# Patient Record
Sex: MALE | Race: WHITE | Employment: FULL TIME | URBAN - METROPOLITAN AREA
[De-identification: names, ages, dates, MRNs, and addresses within clinical notes are randomized per-mention and may not be internally consistent; named-entity substitution may affect disease eponyms.]

---

## 2017-03-24 ENCOUNTER — OFFICE VISIT (OUTPATIENT)
Dept: INTERNAL MEDICINE | Age: 42
End: 2017-03-24
Attending: PODIATRIST

## 2017-03-24 DIAGNOSIS — S82.892D CLOSED FRACTURE OF LEFT ANKLE WITH ROUTINE HEALING: ICD-10-CM

## 2017-03-24 RX ORDER — OXYCODONE HYDROCHLORIDE AND ACETAMINOPHEN 5; 325 MG/1; MG/1
1-2 TABLET ORAL EVERY 4 HOURS PRN
Qty: 30 TABLET | Refills: 0 | Status: SHIPPED | OUTPATIENT
Start: 2017-03-24 | End: 2017-03-31

## 2017-03-28 ENCOUNTER — TELEPHONE (OUTPATIENT)
Dept: INTERNAL MEDICINE | Age: 42
End: 2017-03-28

## 2017-03-31 ENCOUNTER — TELEPHONE (OUTPATIENT)
Dept: INTERNAL MEDICINE | Age: 42
End: 2017-03-31

## 2017-04-07 ENCOUNTER — OFFICE VISIT (OUTPATIENT)
Dept: INTERNAL MEDICINE | Age: 42
End: 2017-04-07
Attending: PODIATRIST

## 2017-04-07 DIAGNOSIS — S82.892D CLOSED FRACTURE OF LEFT ANKLE WITH ROUTINE HEALING: Primary | ICD-10-CM

## 2017-04-21 ENCOUNTER — OFFICE VISIT (OUTPATIENT)
Dept: INTERNAL MEDICINE | Age: 42
End: 2017-04-21
Attending: PODIATRIST

## 2017-04-21 ENCOUNTER — HOSPITAL ENCOUNTER (OUTPATIENT)
Dept: OTHER | Age: 42
Discharge: HOME | End: 2017-04-21
Attending: PODIATRIST | Admitting: PODIATRIST

## 2017-04-21 DIAGNOSIS — S82.892G ANKLE FRACTURE, LEFT, CLOSED, WITH DELAYED HEALING, SUBSEQUENT ENCOUNTER: Primary | ICD-10-CM

## 2017-04-21 DIAGNOSIS — S82.892D CLOSED FRACTURE OF LEFT ANKLE WITH ROUTINE HEALING: ICD-10-CM

## 2017-05-05 ENCOUNTER — HOSPITAL ENCOUNTER (OUTPATIENT)
Dept: OTHER | Age: 42
Discharge: HOME | End: 2017-05-05
Attending: PODIATRIST | Admitting: PODIATRIST

## 2017-05-05 DIAGNOSIS — S82.892G ANKLE FRACTURE, LEFT, CLOSED, WITH DELAYED HEALING, SUBSEQUENT ENCOUNTER: ICD-10-CM

## 2017-05-26 ENCOUNTER — HOSPITAL ENCOUNTER (OUTPATIENT)
Dept: OTHER | Age: 42
Discharge: HOME | End: 2017-05-26
Attending: PODIATRIST | Admitting: PODIATRIST

## 2017-05-26 DIAGNOSIS — S82.892D CLOSED FRACTURE OF LEFT ANKLE WITH ROUTINE HEALING: ICD-10-CM

## 2017-06-02 ENCOUNTER — OFFICE VISIT (OUTPATIENT)
Dept: INTERNAL MEDICINE | Age: 42
End: 2017-06-02
Attending: PODIATRIST

## 2017-06-02 DIAGNOSIS — S82.892D CLOSED FRACTURE OF LEFT ANKLE WITH ROUTINE HEALING: Primary | ICD-10-CM

## 2017-10-12 PROBLEM — R56.9 ALCOHOL WITHDRAWAL SEIZURE WITHOUT COMPLICATION (HCC): Status: ACTIVE | Noted: 2017-10-12

## 2017-10-12 PROBLEM — F10.939 ALCOHOL WITHDRAWAL (HCC): Status: ACTIVE | Noted: 2017-10-12

## 2017-10-12 PROBLEM — F10.930 ALCOHOL WITHDRAWAL SEIZURE WITHOUT COMPLICATION (HCC): Status: ACTIVE | Noted: 2017-10-12

## 2017-10-13 ENCOUNTER — CASE MANAGEMENT (OUTPATIENT)
Dept: EMERGENCY DEPT | Age: 42
End: 2017-10-13

## 2017-10-13 PROBLEM — F10.20 ALCOHOL USE DISORDER, SEVERE, DEPENDENCE (HCC): Chronic | Status: ACTIVE | Noted: 2017-10-13

## 2017-10-14 PROBLEM — E87.6 HYPOKALEMIA: Status: ACTIVE | Noted: 2017-10-14

## 2017-10-14 PROBLEM — G93.41 ACUTE METABOLIC ENCEPHALOPATHY: Status: ACTIVE | Noted: 2017-10-14

## 2017-10-15 PROBLEM — F10.931 ALCOHOL WITHDRAWAL SYNDROME, WITH DELIRIUM (HCC): Status: ACTIVE | Noted: 2017-10-12

## 2018-12-09 ENCOUNTER — APPOINTMENT (OUTPATIENT)
Dept: CT IMAGING | Age: 43
DRG: 897 | End: 2018-12-09

## 2018-12-09 ENCOUNTER — HOSPITAL ENCOUNTER (INPATIENT)
Age: 43
LOS: 6 days | Discharge: HOME | DRG: 897 | End: 2018-12-15
Attending: EMERGENCY MEDICINE | Admitting: FAMILY MEDICINE

## 2018-12-09 DIAGNOSIS — I63.9 CEREBROVASCULAR ACCIDENT (CVA), UNSPECIFIED MECHANISM (HCC): ICD-10-CM

## 2018-12-09 DIAGNOSIS — R56.9 SEIZURE (HCC): Primary | ICD-10-CM

## 2018-12-09 DIAGNOSIS — S09.90XA INJURY OF HEAD, INITIAL ENCOUNTER: ICD-10-CM

## 2018-12-09 DIAGNOSIS — F10.939 ALCOHOL WITHDRAWAL SYNDROME WITH COMPLICATION (HCC): ICD-10-CM

## 2018-12-09 LAB
A/G RATIO: 1 (ref 1.1–2.2)
ALBUMIN SERPL-MCNC: 4.2 G/DL (ref 3.4–5)
ALP BLD-CCNC: 77 U/L (ref 40–129)
ALT SERPL-CCNC: 100 U/L (ref 10–40)
ANION GAP SERPL CALCULATED.3IONS-SCNC: 22 MMOL/L (ref 3–16)
APTT: 26.5 SEC (ref 26–36)
AST SERPL-CCNC: 130 U/L (ref 15–37)
BASOPHILS ABSOLUTE: 0 K/UL (ref 0–0.2)
BASOPHILS RELATIVE PERCENT: 0.7 %
BILIRUB SERPL-MCNC: 1.8 MG/DL (ref 0–1)
BUN BLDV-MCNC: 10 MG/DL (ref 7–20)
CALCIUM SERPL-MCNC: 9.3 MG/DL (ref 8.3–10.6)
CHLORIDE BLD-SCNC: 93 MMOL/L (ref 99–110)
CO2: 23 MMOL/L (ref 21–32)
CREAT SERPL-MCNC: 0.6 MG/DL (ref 0.9–1.3)
EOSINOPHILS ABSOLUTE: 0 K/UL (ref 0–0.6)
EOSINOPHILS RELATIVE PERCENT: 0 %
ETHANOL: NORMAL MG/DL (ref 0–0.08)
GFR AFRICAN AMERICAN: >60
GFR NON-AFRICAN AMERICAN: >60
GLOBULIN: 4.2 G/DL
GLUCOSE BLD-MCNC: 169 MG/DL (ref 70–99)
HCT VFR BLD CALC: 37.6 % (ref 40.5–52.5)
HEMOGLOBIN: 13 G/DL (ref 13.5–17.5)
INR BLD: 0.99 (ref 0.86–1.14)
LYMPHOCYTES ABSOLUTE: 0.6 K/UL (ref 1–5.1)
LYMPHOCYTES RELATIVE PERCENT: 10.9 %
MCH RBC QN AUTO: 35.1 PG (ref 26–34)
MCHC RBC AUTO-ENTMCNC: 34.6 G/DL (ref 31–36)
MCV RBC AUTO: 101.5 FL (ref 80–100)
MONOCYTES ABSOLUTE: 0.6 K/UL (ref 0–1.3)
MONOCYTES RELATIVE PERCENT: 10.1 %
NEUTROPHILS ABSOLUTE: 4.6 K/UL (ref 1.7–7.7)
NEUTROPHILS RELATIVE PERCENT: 78.3 %
PDW BLD-RTO: 13.8 % (ref 12.4–15.4)
PLATELET # BLD: 154 K/UL (ref 135–450)
PMV BLD AUTO: 7.8 FL (ref 5–10.5)
POTASSIUM SERPL-SCNC: 3.3 MMOL/L (ref 3.5–5.1)
PROTHROMBIN TIME: 11.3 SEC (ref 9.8–13)
RBC # BLD: 3.71 M/UL (ref 4.2–5.9)
SODIUM BLD-SCNC: 138 MMOL/L (ref 136–145)
TOTAL PROTEIN: 8.4 G/DL (ref 6.4–8.2)
WBC # BLD: 5.9 K/UL (ref 4–11)

## 2018-12-09 PROCEDURE — G0480 DRUG TEST DEF 1-7 CLASSES: HCPCS

## 2018-12-09 PROCEDURE — 2500000003 HC RX 250 WO HCPCS: Performed by: PHYSICIAN ASSISTANT

## 2018-12-09 PROCEDURE — 96368 THER/DIAG CONCURRENT INF: CPT

## 2018-12-09 PROCEDURE — 6370000000 HC RX 637 (ALT 250 FOR IP): Performed by: FAMILY MEDICINE

## 2018-12-09 PROCEDURE — 2580000003 HC RX 258: Performed by: PHYSICIAN ASSISTANT

## 2018-12-09 PROCEDURE — 80053 COMPREHEN METABOLIC PANEL: CPT

## 2018-12-09 PROCEDURE — 72125 CT NECK SPINE W/O DYE: CPT

## 2018-12-09 PROCEDURE — 85610 PROTHROMBIN TIME: CPT

## 2018-12-09 PROCEDURE — 96375 TX/PRO/DX INJ NEW DRUG ADDON: CPT

## 2018-12-09 PROCEDURE — 6360000002 HC RX W HCPCS: Performed by: PHYSICIAN ASSISTANT

## 2018-12-09 PROCEDURE — 96365 THER/PROPH/DIAG IV INF INIT: CPT

## 2018-12-09 PROCEDURE — 85730 THROMBOPLASTIN TIME PARTIAL: CPT

## 2018-12-09 PROCEDURE — 70450 CT HEAD/BRAIN W/O DYE: CPT

## 2018-12-09 PROCEDURE — 6360000002 HC RX W HCPCS: Performed by: FAMILY MEDICINE

## 2018-12-09 PROCEDURE — 2500000003 HC RX 250 WO HCPCS: Performed by: FAMILY MEDICINE

## 2018-12-09 PROCEDURE — 85025 COMPLETE CBC W/AUTO DIFF WBC: CPT

## 2018-12-09 PROCEDURE — 2060000000 HC ICU INTERMEDIATE R&B

## 2018-12-09 PROCEDURE — 99285 EMERGENCY DEPT VISIT HI MDM: CPT

## 2018-12-09 PROCEDURE — 80074 ACUTE HEPATITIS PANEL: CPT

## 2018-12-09 PROCEDURE — 2580000003 HC RX 258: Performed by: FAMILY MEDICINE

## 2018-12-09 RX ORDER — ATORVASTATIN CALCIUM 80 MG/1
40 TABLET, FILM COATED ORAL NIGHTLY
Status: DISCONTINUED | OUTPATIENT
Start: 2018-12-09 | End: 2018-12-15 | Stop reason: HOSPADM

## 2018-12-09 RX ORDER — LORAZEPAM 2 MG/ML
3 INJECTION INTRAMUSCULAR
Status: DISCONTINUED | OUTPATIENT
Start: 2018-12-09 | End: 2018-12-15 | Stop reason: HOSPADM

## 2018-12-09 RX ORDER — LORAZEPAM 2 MG/ML
1 INJECTION INTRAMUSCULAR ONCE
Status: COMPLETED | OUTPATIENT
Start: 2018-12-09 | End: 2018-12-09

## 2018-12-09 RX ORDER — LORAZEPAM 1 MG/1
3 TABLET ORAL
Status: DISCONTINUED | OUTPATIENT
Start: 2018-12-09 | End: 2018-12-15 | Stop reason: HOSPADM

## 2018-12-09 RX ORDER — LORAZEPAM 1 MG/1
1 TABLET ORAL
Status: DISCONTINUED | OUTPATIENT
Start: 2018-12-09 | End: 2018-12-15 | Stop reason: HOSPADM

## 2018-12-09 RX ORDER — LORAZEPAM 1 MG/1
2 TABLET ORAL
Status: DISCONTINUED | OUTPATIENT
Start: 2018-12-09 | End: 2018-12-15 | Stop reason: HOSPADM

## 2018-12-09 RX ORDER — SODIUM CHLORIDE 0.9 % (FLUSH) 0.9 %
10 SYRINGE (ML) INJECTION PRN
Status: DISCONTINUED | OUTPATIENT
Start: 2018-12-09 | End: 2018-12-15 | Stop reason: HOSPADM

## 2018-12-09 RX ORDER — LORAZEPAM 2 MG/ML
2 INJECTION INTRAMUSCULAR
Status: DISCONTINUED | OUTPATIENT
Start: 2018-12-09 | End: 2018-12-15 | Stop reason: HOSPADM

## 2018-12-09 RX ORDER — LORAZEPAM 2 MG/ML
4 INJECTION INTRAMUSCULAR
Status: DISCONTINUED | OUTPATIENT
Start: 2018-12-09 | End: 2018-12-15 | Stop reason: HOSPADM

## 2018-12-09 RX ORDER — SODIUM CHLORIDE 0.9 % (FLUSH) 0.9 %
10 SYRINGE (ML) INJECTION EVERY 12 HOURS SCHEDULED
Status: DISCONTINUED | OUTPATIENT
Start: 2018-12-09 | End: 2018-12-15 | Stop reason: HOSPADM

## 2018-12-09 RX ORDER — MAGNESIUM SULFATE 1 G/100ML
1 INJECTION INTRAVENOUS PRN
Status: DISCONTINUED | OUTPATIENT
Start: 2018-12-09 | End: 2018-12-15 | Stop reason: HOSPADM

## 2018-12-09 RX ORDER — LORAZEPAM 1 MG/1
4 TABLET ORAL
Status: DISCONTINUED | OUTPATIENT
Start: 2018-12-09 | End: 2018-12-15 | Stop reason: HOSPADM

## 2018-12-09 RX ORDER — POTASSIUM CHLORIDE 20 MEQ/1
40 TABLET, EXTENDED RELEASE ORAL PRN
Status: DISCONTINUED | OUTPATIENT
Start: 2018-12-09 | End: 2018-12-15 | Stop reason: HOSPADM

## 2018-12-09 RX ORDER — LORAZEPAM 2 MG/ML
1 INJECTION INTRAMUSCULAR
Status: DISCONTINUED | OUTPATIENT
Start: 2018-12-09 | End: 2018-12-15 | Stop reason: HOSPADM

## 2018-12-09 RX ORDER — POTASSIUM CHLORIDE 7.45 MG/ML
10 INJECTION INTRAVENOUS PRN
Status: DISCONTINUED | OUTPATIENT
Start: 2018-12-09 | End: 2018-12-15 | Stop reason: HOSPADM

## 2018-12-09 RX ORDER — ONDANSETRON 2 MG/ML
4 INJECTION INTRAMUSCULAR; INTRAVENOUS EVERY 6 HOURS PRN
Status: DISCONTINUED | OUTPATIENT
Start: 2018-12-09 | End: 2018-12-15 | Stop reason: HOSPADM

## 2018-12-09 RX ADMIN — LORAZEPAM 4 MG: 1 TABLET ORAL at 18:16

## 2018-12-09 RX ADMIN — LORAZEPAM 1 MG: 2 INJECTION INTRAMUSCULAR; INTRAVENOUS at 13:53

## 2018-12-09 RX ADMIN — FOLIC ACID: 5 INJECTION, SOLUTION INTRAMUSCULAR; INTRAVENOUS; SUBCUTANEOUS at 18:16

## 2018-12-09 RX ADMIN — Medication 10 ML: at 20:44

## 2018-12-09 RX ADMIN — FOLIC ACID: 5 INJECTION, SOLUTION INTRAMUSCULAR; INTRAVENOUS; SUBCUTANEOUS at 14:13

## 2018-12-09 RX ADMIN — LORAZEPAM 2 MG: 2 INJECTION INTRAMUSCULAR; INTRAVENOUS at 15:29

## 2018-12-09 RX ADMIN — LORAZEPAM 2 MG: 2 INJECTION INTRAMUSCULAR; INTRAVENOUS at 20:44

## 2018-12-09 ASSESSMENT — PAIN DESCRIPTION - DESCRIPTORS: DESCRIPTORS: HEADACHE

## 2018-12-09 ASSESSMENT — ENCOUNTER SYMPTOMS
DIARRHEA: 0
VOMITING: 0
SHORTNESS OF BREATH: 0
BACK PAIN: 0
ANAL BLEEDING: 0
ABDOMINAL PAIN: 0
NAUSEA: 0

## 2018-12-09 ASSESSMENT — PAIN SCALES - GENERAL: PAINLEVEL_OUTOF10: 0

## 2018-12-09 NOTE — ED NOTES
Pt a/o x 4, with c/o head pain from fall. Dr. Mcgill Must bedside to evaluate pt. Pt undressed, placed on heart monitor with b/p set to cycle. Pt lying supine in bed with bed in low position, side rails up x 2, with seizure pads in place per protocol. Pt IV established and blood work obtained. Pt with noted visible tremors. Pt c/o nausea. CIWA completed.  Pt updated on plan of care, v/u.      Ana M Alexander, RN  12/09/18 2241

## 2018-12-09 NOTE — ED PROVIDER NOTES
given some Ativan and started on CIWA protocol. He denies suicidal or homicidal ideation. Neurologic exam is unremarkable besides a mild initial confusion. CT of the head was obtained due to head injury along with CT of the neck due to his initial confusion. CT the head does reveal an age-indeterminate infarct. Patient is still slightly tachycardic and hypertensive. Concern is for possible seizure related, with troponin was given banana bag and placed on seizure precautions. Given this likely a local withdrawal with infarct on CT patient was admitted to hospital service for further workup and treatment. Patient understood why he needed to be admitted with stable time of admission. FINAL IMPRESSION      1. Seizure ([address]    2. Alcohol withdrawal syndrome with complication ([address]    3. Injury of head, initial encounter    4. Cerebrovascular accident (CVA), unspecified mechanism ([facility] [address]          [address] Admitted 12/09/2018 03:23:26 PM      PATIENT REFERREDTO:  No follow-up provider specified.     DISCHARGE MEDICATIONS:  New Prescriptions    No medications on file       DISCONTINUED MEDICATIONS:  Discontinued Medications    No medications on file              (Please note that portions ofthis note were completed with a voice recognition program.  Efforts were made to edit the dictations but occasionally words are mis-transcribed.)    Marjan Sanchez PA-C (electronically signed)           Marjan Sanchez PA-C  12/09/18 3807

## 2018-12-09 NOTE — ED PROVIDER NOTES
normal coordination of extremities, no facial asymmetry  Psychiatric:  Affect appropriate    Medical Decision Making and Plan: Briefly, this is an 39 y.o.male who presented with seizure that occurred at work. He has a history of alcohol withdrawal seizure in the past with admission last year. We have suspicion for recurrent alcohol withdrawal.  The patient had incontinence of urine with the seizure he had today with a forehead hematoma. CT findings abnormal without ICH. Plan is to admit for further care. For further details of [facility] encounter, please see documentation by advanced practice provider STEVE Hyde.      Labs Reviewed   CBC WITH AUTO DIFFERENTIAL - Abnormal; Notable for the following:        Result Value    RBC 3.71 (*)     Hemoglobin 13.0 (*)     Hematocrit 37.6 (*)     .5 (*)     MCH 35.1 (*)     Lymphocytes # 0.6 (*)     All other components within normal limits    Narrative:     Performed at:  [facility] [address],  [place], [employer] [place]   Phone [phone]   COMPREHENSIVE METABOLIC PANEL - Abnormal; Notable for the following:     Potassium 3.3 (*)     Chloride 93 (*)     Anion Gap 22 (*)     Glucose 169 (*)     CREATININE 0.6 (*)     Total Protein 8.4 (*)     Albumin/Globulin Ratio 1.0 (*)     Total Bilirubin 1.8 (*)      (*)      (*)     All other components within normal limits    Narrative:     Performed at:  [facility] [address],  [place], [employer]   Phone [phone]   APTT    Narrative:     Performed at:  [facility] [address],  [place], [employer]   Phone [phone]   PROTIME-INR    Narrative:     Performed at:  [facility] [address],  [place]   Phone [phone]   ETHANOL    Narrative:     Performed at:  [facility]  [address] [facility], [address]   Phone [phone]   URINE RT REFLEX TO CULTURE     RADIOLOGY:     Plain x-rays were viewed by me:   CT Cervical Spine WO Contrast   Final Result   1. No acute fracture. CT Head WO Contrast   Final Result   1. New age-indeterminate right basal ganglia infarct. If there are focal   neurologic deficits, recommend MRI of the head. CRITICAL CARE:  Total critical care time of 31 minutes (excludes any time for procedures). This includes but not limited to vital sign monitoring, medication, clinical response to medications, interpretation of diagnostics, review of nursing notes, pertinent record review, discussions about patient condition, consultation time, documentation time. Critical care due to the patient's alcohol withdrawal.    Medications administered:  Medications   LORazepam (ATIVAN) tablet 1 mg ( Oral See Alternative 12/9/18 1529)     Or   LORazepam (ATIVAN) injection 1 mg ( Intravenous See Alternative 12/9/18 1529)     Or   LORazepam (ATIVAN) tablet 2 mg ( Oral See Alternative 12/9/18 1529)     Or   LORazepam (ATIVAN) injection 2 mg (2 mg Intravenous Given 12/9/18 1529)     Or   LORazepam (ATIVAN) tablet 3 mg ( Oral See Alternative 12/9/18 1529)     Or   LORazepam (ATIVAN) injection 3 mg ( Intravenous See Alternative 12/9/18 1529)     Or   LORazepam (ATIVAN) tablet 4 mg ( Oral See Alternative 12/9/18 1529)     Or   LORazepam (ATIVAN) injection 4 mg ( Intravenous See Alternative 12/9/18 1529)   sodium chloride 0.9 % 9,323 mL with folic acid 1 mg, adult multi-vitamin with vitamin k 10 mL, thiamine 100 mg ( Intravenous Stopped 12/9/18 1624)   LORazepam (ATIVAN) injection 1 mg (1 mg Intravenous Given 12/9/18 1353)     Vitals:    12/09/18 1530 12/09/18 1545 12/09/18 1600 12/09/18 1614   BP: (!) 160/95 (!) 156/98 (!) 147/97 (!) 163/99   Pulse: 94 94 96 98   Resp: 12 15 12 13   Temp:       SpO2: 97%      Weight:         FINAL IMPRESSION:    1.

## 2018-12-10 ENCOUNTER — APPOINTMENT (OUTPATIENT)
Dept: ULTRASOUND IMAGING | Age: 43
DRG: 897 | End: 2018-12-10

## 2018-12-10 PROBLEM — G31.2 ALCOHOLIC ENCEPHALOPATHY (HCC): Status: ACTIVE | Noted: 2018-12-10

## 2018-12-10 LAB
ANION GAP SERPL CALCULATED.3IONS-SCNC: 15 MMOL/L (ref 3–16)
BUN BLDV-MCNC: 7 MG/DL (ref 7–20)
CALCIUM SERPL-MCNC: 8.8 MG/DL (ref 8.3–10.6)
CHLORIDE BLD-SCNC: 98 MMOL/L (ref 99–110)
CO2: 26 MMOL/L (ref 21–32)
CREAT SERPL-MCNC: <0.5 MG/DL (ref 0.9–1.3)
GFR AFRICAN AMERICAN: >60
GFR NON-AFRICAN AMERICAN: >60
GLUCOSE BLD-MCNC: 80 MG/DL (ref 70–99)
HAV IGM SER IA-ACNC: NORMAL
HEPATITIS B CORE IGM ANTIBODY: NORMAL
HEPATITIS B SURFACE ANTIGEN INTERPRETATION: NORMAL
HEPATITIS C ANTIBODY INTERPRETATION: NORMAL
MAGNESIUM: 1.3 MG/DL (ref 1.8–2.4)
POTASSIUM SERPL-SCNC: 3 MMOL/L (ref 3.5–5.1)
SODIUM BLD-SCNC: 139 MMOL/L (ref 136–145)

## 2018-12-10 PROCEDURE — 6360000002 HC RX W HCPCS: Performed by: INTERNAL MEDICINE

## 2018-12-10 PROCEDURE — 6370000000 HC RX 637 (ALT 250 FOR IP): Performed by: NURSE PRACTITIONER

## 2018-12-10 PROCEDURE — 95819 EEG AWAKE AND ASLEEP: CPT | Performed by: PSYCHIATRY & NEUROLOGY

## 2018-12-10 PROCEDURE — 36415 COLL VENOUS BLD VENIPUNCTURE: CPT

## 2018-12-10 PROCEDURE — 2000000000 HC ICU R&B

## 2018-12-10 PROCEDURE — 83735 ASSAY OF MAGNESIUM: CPT

## 2018-12-10 PROCEDURE — 6370000000 HC RX 637 (ALT 250 FOR IP): Performed by: PHYSICIAN ASSISTANT

## 2018-12-10 PROCEDURE — 6360000002 HC RX W HCPCS: Performed by: FAMILY MEDICINE

## 2018-12-10 PROCEDURE — 2580000003 HC RX 258: Performed by: INTERNAL MEDICINE

## 2018-12-10 PROCEDURE — 6360000002 HC RX W HCPCS: Performed by: PHYSICIAN ASSISTANT

## 2018-12-10 PROCEDURE — 2580000003 HC RX 258: Performed by: FAMILY MEDICINE

## 2018-12-10 PROCEDURE — 80048 BASIC METABOLIC PNL TOTAL CA: CPT

## 2018-12-10 PROCEDURE — 6360000002 HC RX W HCPCS: Performed by: NURSE PRACTITIONER

## 2018-12-10 PROCEDURE — 95819 EEG AWAKE AND ASLEEP: CPT

## 2018-12-10 PROCEDURE — 2500000003 HC RX 250 WO HCPCS: Performed by: FAMILY MEDICINE

## 2018-12-10 PROCEDURE — 2500000003 HC RX 250 WO HCPCS: Performed by: INTERNAL MEDICINE

## 2018-12-10 PROCEDURE — 6370000000 HC RX 637 (ALT 250 FOR IP): Performed by: FAMILY MEDICINE

## 2018-12-10 PROCEDURE — 76705 ECHO EXAM OF ABDOMEN: CPT

## 2018-12-10 PROCEDURE — 6360000002 HC RX W HCPCS: Performed by: HOSPITALIST

## 2018-12-10 RX ORDER — HALOPERIDOL 5 MG/ML
INJECTION INTRAMUSCULAR
Status: DISPENSED
Start: 2018-12-10 | End: 2018-12-11

## 2018-12-10 RX ORDER — HALOPERIDOL 5 MG/ML
5 INJECTION INTRAMUSCULAR ONCE
Status: COMPLETED | OUTPATIENT
Start: 2018-12-10 | End: 2018-12-10

## 2018-12-10 RX ORDER — MAGNESIUM SULFATE IN WATER 40 MG/ML
4 INJECTION, SOLUTION INTRAVENOUS ONCE
Status: COMPLETED | OUTPATIENT
Start: 2018-12-10 | End: 2018-12-10

## 2018-12-10 RX ADMIN — POTASSIUM CHLORIDE 10 MEQ: 7.46 INJECTION, SOLUTION INTRAVENOUS at 23:01

## 2018-12-10 RX ADMIN — ATORVASTATIN CALCIUM 40 MG: 80 TABLET, FILM COATED ORAL at 00:04

## 2018-12-10 RX ADMIN — Medication 10 ML: at 20:02

## 2018-12-10 RX ADMIN — Medication 10 ML: at 10:07

## 2018-12-10 RX ADMIN — FOLIC ACID: 5 INJECTION, SOLUTION INTRAMUSCULAR; INTRAVENOUS; SUBCUTANEOUS at 09:05

## 2018-12-10 RX ADMIN — POTASSIUM CHLORIDE 10 MEQ: 7.46 INJECTION, SOLUTION INTRAVENOUS at 22:01

## 2018-12-10 RX ADMIN — LORAZEPAM 4 MG: 2 INJECTION INTRAMUSCULAR; INTRAVENOUS at 16:22

## 2018-12-10 RX ADMIN — LORAZEPAM 4 MG: 2 INJECTION INTRAMUSCULAR; INTRAVENOUS at 09:02

## 2018-12-10 RX ADMIN — LORAZEPAM 4 MG: 2 INJECTION INTRAMUSCULAR; INTRAVENOUS at 13:39

## 2018-12-10 RX ADMIN — LORAZEPAM 4 MG: 2 INJECTION INTRAMUSCULAR; INTRAVENOUS at 08:17

## 2018-12-10 RX ADMIN — HALOPERIDOL LACTATE 5 MG: 5 INJECTION INTRAMUSCULAR at 17:24

## 2018-12-10 RX ADMIN — DEXMEDETOMIDINE HYDROCHLORIDE 0.2 MCG/KG/HR: 100 INJECTION, SOLUTION INTRAVENOUS at 19:47

## 2018-12-10 RX ADMIN — LORAZEPAM 3 MG: 2 INJECTION INTRAMUSCULAR; INTRAVENOUS at 11:12

## 2018-12-10 RX ADMIN — LORAZEPAM 4 MG: 2 INJECTION INTRAMUSCULAR; INTRAVENOUS at 17:33

## 2018-12-10 RX ADMIN — MAGNESIUM SULFATE HEPTAHYDRATE 4 G: 40 INJECTION, SOLUTION INTRAVENOUS at 11:31

## 2018-12-10 RX ADMIN — LORAZEPAM 4 MG: 2 INJECTION INTRAMUSCULAR; INTRAVENOUS at 18:55

## 2018-12-10 RX ADMIN — LORAZEPAM 2 MG: 2 INJECTION INTRAMUSCULAR; INTRAVENOUS at 03:37

## 2018-12-10 RX ADMIN — POTASSIUM CHLORIDE 10 MEQ: 7.46 INJECTION, SOLUTION INTRAVENOUS at 20:55

## 2018-12-10 RX ADMIN — LORAZEPAM 4 MG: 2 INJECTION INTRAMUSCULAR; INTRAVENOUS at 19:55

## 2018-12-10 RX ADMIN — LORAZEPAM 2 MG: 2 INJECTION INTRAMUSCULAR; INTRAVENOUS at 10:05

## 2018-12-10 RX ADMIN — LORAZEPAM 4 MG: 1 TABLET ORAL at 06:03

## 2018-12-10 RX ADMIN — LORAZEPAM 4 MG: 2 INJECTION INTRAMUSCULAR; INTRAVENOUS at 21:00

## 2018-12-10 RX ADMIN — POTASSIUM CHLORIDE 10 MEQ: 7.46 INJECTION, SOLUTION INTRAVENOUS at 15:49

## 2018-12-10 RX ADMIN — POTASSIUM CHLORIDE 40 MEQ: 1500 TABLET, EXTENDED RELEASE ORAL at 00:04

## 2018-12-10 RX ADMIN — LORAZEPAM 2 MG: 2 INJECTION INTRAMUSCULAR; INTRAVENOUS at 07:04

## 2018-12-10 RX ADMIN — POTASSIUM CHLORIDE 10 MEQ: 7.46 INJECTION, SOLUTION INTRAVENOUS at 19:19

## 2018-12-10 ASSESSMENT — PAIN DESCRIPTION - DESCRIPTORS: DESCRIPTORS: ACHING

## 2018-12-10 ASSESSMENT — PAIN DESCRIPTION - PAIN TYPE: TYPE: ACUTE PAIN

## 2018-12-10 ASSESSMENT — PAIN SCALES - GENERAL
PAINLEVEL_OUTOF10: 0
PAINLEVEL_OUTOF10: 0
PAINLEVEL_OUTOF10: 4
PAINLEVEL_OUTOF10: 0

## 2018-12-10 ASSESSMENT — PAIN DESCRIPTION - LOCATION: LOCATION: HEAD

## 2018-12-10 NOTE — PLAN OF CARE
Problem: Falls - Risk of:  Goal: Absence of physical injury  Absence of physical injury   Outcome: Ongoing  Patient has remained free of falls and physical injury at the time of this note. Preventative measures in place: CIWA protocol, camera in room, bed alarm, patient's room near nurses station   Patient's ambulatory status: High fall risk, patient unable to ambulate at this time. Impulsive, confused, poor safety awareness.

## 2018-12-10 NOTE — PROGRESS NOTES
Pt found with bed alarm sounding as he was attempting to climb out of bed with his legs over the rail. RN seen large pocket knife clipped to his pants which was removed at this time. Pt assisted to use urinal and assisted back to bed with bed alarm set. Security was notified and they locked up the pt's lighter, pocket knife and wallet. Pt wishes to keep his phone and glasses at this time.

## 2018-12-10 NOTE — PROGRESS NOTES
Pt moved close to nurses station for safety. Pt resting in bed with eyes closed at this time. He awakes to verbal stimuli and is cooperative and appropriate at this time. Pt is diaphoretic and tremulous at this time see CIWA, will medicate as appropriate.

## 2018-12-10 NOTE — CARE COORDINATION
Patient was referred to the Benefits counselor to assess insurance eligibility, and medication assistance.

## 2018-12-10 NOTE — PROGRESS NOTES
Pt medicated per BILL after he was attempting to climb out of bed, he stated he has been her for 4 days and hasn't walked since then. Pt showing severe tremors at this time along with headache and beading sweat. Pt is now unsure of what day it is he states it is Thursday 12-12.

## 2018-12-11 LAB
ALBUMIN SERPL-MCNC: 3.9 G/DL (ref 3.4–5)
ALP BLD-CCNC: 75 U/L (ref 40–129)
ALT SERPL-CCNC: 112 U/L (ref 10–40)
ANION GAP SERPL CALCULATED.3IONS-SCNC: 19 MMOL/L (ref 3–16)
AST SERPL-CCNC: 168 U/L (ref 15–37)
BILIRUB SERPL-MCNC: 2.1 MG/DL (ref 0–1)
BILIRUBIN DIRECT: 0.4 MG/DL (ref 0–0.3)
BILIRUBIN, INDIRECT: 1.7 MG/DL (ref 0–1)
BUN BLDV-MCNC: 7 MG/DL (ref 7–20)
CALCIUM SERPL-MCNC: 8.7 MG/DL (ref 8.3–10.6)
CHLORIDE BLD-SCNC: 97 MMOL/L (ref 99–110)
CO2: 22 MMOL/L (ref 21–32)
CREAT SERPL-MCNC: <0.5 MG/DL (ref 0.9–1.3)
GFR AFRICAN AMERICAN: >60
GFR NON-AFRICAN AMERICAN: >60
GLUCOSE BLD-MCNC: 81 MG/DL (ref 70–99)
HCT VFR BLD CALC: 37.5 % (ref 40.5–52.5)
HEMOGLOBIN: 12.9 G/DL (ref 13.5–17.5)
MAGNESIUM: 1.8 MG/DL (ref 1.8–2.4)
MCH RBC QN AUTO: 34.7 PG (ref 26–34)
MCHC RBC AUTO-ENTMCNC: 34.5 G/DL (ref 31–36)
MCV RBC AUTO: 100.6 FL (ref 80–100)
PDW BLD-RTO: 13.3 % (ref 12.4–15.4)
PHOSPHORUS: 2.7 MG/DL (ref 2.5–4.9)
PLATELET # BLD: 144 K/UL (ref 135–450)
PMV BLD AUTO: 8 FL (ref 5–10.5)
POTASSIUM SERPL-SCNC: 3.3 MMOL/L (ref 3.5–5.1)
POTASSIUM SERPL-SCNC: 3.3 MMOL/L (ref 3.5–5.1)
RBC # BLD: 3.73 M/UL (ref 4.2–5.9)
SODIUM BLD-SCNC: 138 MMOL/L (ref 136–145)
TOTAL PROTEIN: 8.3 G/DL (ref 6.4–8.2)
WBC # BLD: 6.1 K/UL (ref 4–11)

## 2018-12-11 PROCEDURE — 6360000002 HC RX W HCPCS: Performed by: PHYSICIAN ASSISTANT

## 2018-12-11 PROCEDURE — 2580000003 HC RX 258: Performed by: FAMILY MEDICINE

## 2018-12-11 PROCEDURE — 83735 ASSAY OF MAGNESIUM: CPT

## 2018-12-11 PROCEDURE — 6360000002 HC RX W HCPCS: Performed by: PEDIATRICS

## 2018-12-11 PROCEDURE — 84132 ASSAY OF SERUM POTASSIUM: CPT

## 2018-12-11 PROCEDURE — 6370000000 HC RX 637 (ALT 250 FOR IP): Performed by: PHYSICIAN ASSISTANT

## 2018-12-11 PROCEDURE — 6370000000 HC RX 637 (ALT 250 FOR IP): Performed by: NURSE PRACTITIONER

## 2018-12-11 PROCEDURE — 36415 COLL VENOUS BLD VENIPUNCTURE: CPT

## 2018-12-11 PROCEDURE — 2000000000 HC ICU R&B

## 2018-12-11 PROCEDURE — 99291 CRITICAL CARE FIRST HOUR: CPT | Performed by: INTERNAL MEDICINE

## 2018-12-11 PROCEDURE — 6360000002 HC RX W HCPCS: Performed by: FAMILY MEDICINE

## 2018-12-11 PROCEDURE — 84100 ASSAY OF PHOSPHORUS: CPT

## 2018-12-11 PROCEDURE — 6360000002 HC RX W HCPCS: Performed by: HOSPITALIST

## 2018-12-11 PROCEDURE — 6360000002 HC RX W HCPCS: Performed by: NURSE PRACTITIONER

## 2018-12-11 PROCEDURE — 2500000003 HC RX 250 WO HCPCS: Performed by: INTERNAL MEDICINE

## 2018-12-11 PROCEDURE — 6370000000 HC RX 637 (ALT 250 FOR IP): Performed by: FAMILY MEDICINE

## 2018-12-11 PROCEDURE — 80076 HEPATIC FUNCTION PANEL: CPT

## 2018-12-11 PROCEDURE — 85027 COMPLETE CBC AUTOMATED: CPT

## 2018-12-11 PROCEDURE — 2500000003 HC RX 250 WO HCPCS: Performed by: FAMILY MEDICINE

## 2018-12-11 PROCEDURE — 80048 BASIC METABOLIC PNL TOTAL CA: CPT

## 2018-12-11 PROCEDURE — 2580000003 HC RX 258: Performed by: HOSPITALIST

## 2018-12-11 PROCEDURE — 6370000000 HC RX 637 (ALT 250 FOR IP): Performed by: INTERNAL MEDICINE

## 2018-12-11 PROCEDURE — 2580000003 HC RX 258: Performed by: INTERNAL MEDICINE

## 2018-12-11 RX ORDER — CHLORDIAZEPOXIDE HYDROCHLORIDE 25 MG/1
25 CAPSULE, GELATIN COATED ORAL 3 TIMES DAILY
Status: DISCONTINUED | OUTPATIENT
Start: 2018-12-11 | End: 2018-12-14

## 2018-12-11 RX ORDER — HYDRALAZINE HYDROCHLORIDE 20 MG/ML
10 INJECTION INTRAMUSCULAR; INTRAVENOUS EVERY 6 HOURS PRN
Status: DISCONTINUED | OUTPATIENT
Start: 2018-12-11 | End: 2018-12-15 | Stop reason: HOSPADM

## 2018-12-11 RX ORDER — MAGNESIUM SULFATE IN WATER 40 MG/ML
2 INJECTION, SOLUTION INTRAVENOUS ONCE
Status: COMPLETED | OUTPATIENT
Start: 2018-12-11 | End: 2018-12-11

## 2018-12-11 RX ORDER — POTASSIUM CHLORIDE 7.45 MG/ML
20 INJECTION INTRAVENOUS ONCE
Status: COMPLETED | OUTPATIENT
Start: 2018-12-11 | End: 2018-12-11

## 2018-12-11 RX ORDER — M-VIT,TX,IRON,MINS/CALC/FOLIC 27MG-0.4MG
1 TABLET ORAL DAILY
COMMUNITY
End: 2019-07-31 | Stop reason: ALTCHOICE

## 2018-12-11 RX ORDER — NICOTINE 21 MG/24HR
1 PATCH, TRANSDERMAL 24 HOURS TRANSDERMAL DAILY
Status: DISCONTINUED | OUTPATIENT
Start: 2018-12-11 | End: 2018-12-15 | Stop reason: HOSPADM

## 2018-12-11 RX ADMIN — LORAZEPAM 1 MG: 1 TABLET ORAL at 12:10

## 2018-12-11 RX ADMIN — LORAZEPAM 4 MG: 2 INJECTION INTRAMUSCULAR; INTRAVENOUS at 22:53

## 2018-12-11 RX ADMIN — LORAZEPAM 3 MG: 1 TABLET ORAL at 21:40

## 2018-12-11 RX ADMIN — Medication 10 ML: at 10:26

## 2018-12-11 RX ADMIN — LORAZEPAM 4 MG: 2 INJECTION INTRAMUSCULAR; INTRAVENOUS at 01:45

## 2018-12-11 RX ADMIN — FOLIC ACID: 5 INJECTION, SOLUTION INTRAMUSCULAR; INTRAVENOUS; SUBCUTANEOUS at 10:41

## 2018-12-11 RX ADMIN — POTASSIUM CHLORIDE 40 MEQ: 1500 TABLET, EXTENDED RELEASE ORAL at 17:16

## 2018-12-11 RX ADMIN — LORAZEPAM 4 MG: 2 INJECTION INTRAMUSCULAR; INTRAVENOUS at 00:45

## 2018-12-11 RX ADMIN — DEXMEDETOMIDINE HYDROCHLORIDE 0.9 MCG/KG/HR: 100 INJECTION, SOLUTION INTRAVENOUS at 00:57

## 2018-12-11 RX ADMIN — CHLORDIAZEPOXIDE HYDROCHLORIDE 25 MG: 25 CAPSULE ORAL at 14:23

## 2018-12-11 RX ADMIN — LORAZEPAM 2 MG: 2 INJECTION INTRAMUSCULAR; INTRAVENOUS at 16:55

## 2018-12-11 RX ADMIN — LORAZEPAM 1 MG: 1 TABLET ORAL at 18:05

## 2018-12-11 RX ADMIN — CHLORDIAZEPOXIDE HYDROCHLORIDE 25 MG: 25 CAPSULE ORAL at 20:07

## 2018-12-11 RX ADMIN — MAGNESIUM SULFATE HEPTAHYDRATE 2 G: 40 INJECTION, SOLUTION INTRAVENOUS at 11:17

## 2018-12-11 RX ADMIN — HYDRALAZINE HYDROCHLORIDE 10 MG: 20 INJECTION INTRAMUSCULAR; INTRAVENOUS at 11:05

## 2018-12-11 RX ADMIN — DEXMEDETOMIDINE HYDROCHLORIDE 0.6 MCG/KG/HR: 100 INJECTION, SOLUTION INTRAVENOUS at 12:48

## 2018-12-11 RX ADMIN — LORAZEPAM 4 MG: 2 INJECTION INTRAMUSCULAR; INTRAVENOUS at 08:07

## 2018-12-11 RX ADMIN — LORAZEPAM 2 MG: 2 INJECTION INTRAMUSCULAR; INTRAVENOUS at 15:24

## 2018-12-11 RX ADMIN — CHLORDIAZEPOXIDE HYDROCHLORIDE 25 MG: 25 CAPSULE ORAL at 10:41

## 2018-12-11 RX ADMIN — DEXMEDETOMIDINE HYDROCHLORIDE 1.2 MCG/KG/HR: 100 INJECTION, SOLUTION INTRAVENOUS at 05:35

## 2018-12-11 RX ADMIN — LORAZEPAM 2 MG: 2 INJECTION INTRAMUSCULAR; INTRAVENOUS at 10:58

## 2018-12-11 RX ADMIN — HYDRALAZINE HYDROCHLORIDE 10 MG: 20 INJECTION INTRAMUSCULAR; INTRAVENOUS at 00:45

## 2018-12-11 RX ADMIN — POTASSIUM CHLORIDE 20 MEQ: 7.46 INJECTION, SOLUTION INTRAVENOUS at 05:33

## 2018-12-11 RX ADMIN — THIAMINE HYDROCHLORIDE 200 MG: 100 INJECTION, SOLUTION INTRAMUSCULAR; INTRAVENOUS at 14:15

## 2018-12-11 RX ADMIN — LORAZEPAM 2 MG: 1 TABLET ORAL at 18:56

## 2018-12-11 RX ADMIN — POTASSIUM CHLORIDE 10 MEQ: 7.46 INJECTION, SOLUTION INTRAVENOUS at 00:03

## 2018-12-11 RX ADMIN — LORAZEPAM 4 MG: 1 TABLET ORAL at 20:07

## 2018-12-11 RX ADMIN — ATORVASTATIN CALCIUM 40 MG: 80 TABLET, FILM COATED ORAL at 20:07

## 2018-12-11 ASSESSMENT — PAIN SCALES - GENERAL
PAINLEVEL_OUTOF10: 0

## 2018-12-11 NOTE — CARE COORDINATION
Discharge planning-    Patient transferred from . Patient is a self-pay, [facility]/ Financial Counselor aware. Will speak with the patient, as schedule allows. Plan- Referral to Financial Counselor.     Will continue to follow for support and discharge planning.    -Daniel Queen, MSW, LSW

## 2018-12-11 NOTE — CARE COORDINATION
Discharge Planning Assessment    Patient is resting/ asleep/ sedated on precedex.  spoke with the patient's father/ Sruthi Sawyer ([phone]  to discuss reason for admission, current living situation, and potential needs at the time of discharge. Demographics/Insurance verified: Yes    Current type of dwelling: Two-level home    Living arrangements: Patient's father reports that the patient lives at home with the patient's mother; 15year old son. Level of function/Support: Patient's father reports that the patient was independent in his ADL's prior to admission. Patient's father reports that the patient works full-time at [employer]). Patient's father reports that the patient Mikki Briones tried to quit drinking several times on his own\", most recently prior to admission being 1 year ago, without any medical/ social assistance (i.e. Has never gone to [place] [address]; has never attending outpatient treatment; has never sought medical treatment in this regard). DME: None    Active with any community resources/agencies/skilled home care: None identified. Medication compliance issues: None identified. Financial issues that could impact healthcare: Patient is listed as a self-pay. Patient's father reports that the patient does have a commercial insurance plan (through work), however, he is unsure of the carrier/ payor. Assisted the family with looking for an insurance card through the patient's belongings- none found. Patient's father will follow-up. [facility]/ Financial Counselor notified. Transportation at the time of discharge: Depends on disposition. Family will transport. If the patient goes to residential treatment at discharge, family may be able to transport- if not, will need transport (uber/ lyft/ cab). Tentative discharge plan: Family interested in residential ETOH treatment. [facility]).  Will need follow-up once the patient is

## 2018-12-11 NOTE — PROGRESS NOTES
Dr Tiburcio De La Rosa updated to low potassium and pt unable to take PO. New order for IV potassium once.

## 2018-12-11 NOTE — PROGRESS NOTES
Received report from Talon Ornelas.CHANO. Shift assessment completed, see doc flowsheet for details. NSR. A&OX2. Oriented to self/place, disoriented to time/situation. CIWA- 16, Ginger M, gave PRN ativan. Precedex drip infusing. Bilateral wrist/ankle restraints in place. Knot noted to pt forehead, bruising to bilateral eyes, Right eye bruising more than left. Nonpitting edema to R eye. Drainage noted to bilat. eyelids- warm wash cloth applied. Lungs: Clear in upper lobes, diminished in bases. GI: Abdomen soft, nontender. Bowel sounds active x4. : Condom cath in place yielding clear yellow urine. Fall and safety precautions in place. Will continue to monitor.  Hosea Carter RN, BSN

## 2018-12-11 NOTE — PROGRESS NOTES
BMP:  Recent Labs      12/09/18   1321  12/10/18   0457  12/11/18   0413   NA  138  139  138   K  3.3*  3.0*  3.3*   CL  93*  98*  97*   CO2  23  26  22   BUN  10  7  7   CREATININE  0.6*  <0.5*  <0.5*   GLUCOSE  169*  80  81     Hepatic: Recent Labs      12/09/18   1321   AST  130*   ALT  100*   BILITOT  1.8*   ALKPHOS  77         Problem List  Active Problems:    Alcoholic encephalopathy (HCC)    Alcohol withdrawal syndrome, with delirium (HCC)    Alcohol dependence (HCC)    Seizure-like activity (HCC)  Resolved Problems:    * No resolved hospital problems. *       Assessment & Plan: 1. Fall and Seizure like episode, alcohol withdrawal related? ? Holding ASA and lovenox, He has right frontal scalp hematoma and right lid bruising noted    2. CT head showed an old infarct , EEG and MRI  pending ordered   3. Neurology consulted, Started on Lipitor  4. Admits to drinking 1/2 bottle of vodka almost daily, CIWA protocol, transferred to ICU 12/10,c/w ICU protocol  5. Hypokalemia: replaced  6.  Discussed with mother, father and nursing staff      Diet: DIET GENERAL;  Code:Full Code  DVT PPX lovenox       Christy Quach MD   12/11/2018 8:32 AM

## 2018-12-11 NOTE — CONSULTS
mg, 3 mg, Intravenous, Q1H PRN **OR** LORazepam (ATIVAN) tablet 4 mg, 4 mg, Oral, Q1H PRN **OR** LORazepam (ATIVAN) injection 4 mg, 4 mg, Intravenous, Q1H PRN  sodium chloride flush 0.9 % injection 10 mL, 10 mL, Intravenous, 2 times per day  sodium chloride flush 0.9 % injection 10 mL, 10 mL, Intravenous, PRN  magnesium hydroxide (MILK OF MAGNESIA) 400 MG/5ML suspension 30 mL, 30 mL, Oral, Daily PRN  ondansetron (ZOFRAN) injection 4 mg, 4 mg, Intravenous, Q6H PRN  sodium chloride 0.9 % 0,523 mL with folic acid 1 mg, adult multi-vitamin with vitamin k 10 mL, thiamine 100 mg, , Intravenous, Daily  LORazepam (ATIVAN) tablet 1 mg, 1 mg, Oral, Q1H PRN **OR** LORazepam (ATIVAN) injection 1 mg, 1 mg, Intravenous, Q1H PRN **OR** LORazepam (ATIVAN) tablet 2 mg, 2 mg, Oral, Q1H PRN **OR** LORazepam (ATIVAN) injection 2 mg, 2 mg, Intravenous, Q1H PRN **OR** LORazepam (ATIVAN) tablet 3 mg, 3 mg, Oral, Q1H PRN **OR** LORazepam (ATIVAN) injection 3 mg, 3 mg, Intravenous, Q1H PRN **OR** LORazepam (ATIVAN) tablet 4 mg, 4 mg, Oral, Q1H PRN **OR** LORazepam (ATIVAN) injection 4 mg, 4 mg, Intravenous, Q1H PRN  atorvastatin (LIPITOR) tablet 40 mg, 40 mg, Oral, Nightly  potassium chloride (KLOR-CON M) extended release tablet 40 mEq, 40 mEq, Oral, PRN **OR** potassium bicarb-citric acid (EFFER-K) effervescent tablet 40 mEq, 40 mEq, Oral, PRN **OR** potassium chloride 10 mEq/100 mL IVPB (Peripheral Line), 10 mEq, Intravenous, PRN  magnesium sulfate 1 g in dextrose 5% 100 mL IVPB, 1 g, Intravenous, PRN    CBC:  Recent Labs      12/09/18   1322  12/11/18   0413   WBC  5.9  6.1   RBC  3.71*  3.73*   HGB  13.0*  12.9*   HCT  37.6*  37.5*   PLT  154  144   MCV  101.5*  100.6*   MCH  35.1*  34.7*   MCHC  34.6  34.5   RDW  13.8  13.3      BMP:  Recent Labs      12/09/18   1321  12/10/18   0457  12/11/18   0413   NA  138  139  138   K  3.3*  3.0*  3.3*   CL  93*  98*  97*   CO2  23  26  22   BUN  10  7  7   CREATININE  0.6*  <0.5*  <0.5* CALCIUM  9.3  8.8  8.7   GLUCOSE  169*  80  81        Radiology Review:  Pertinent images / reports were reviewed as a part of this visit. Physical Exam   Constitutional: lethargic but able to respond to command  HENT: No oropharyngeal exudate. ecchymosis and trauma on R forehead and around R eye   Eyes: Pupils are equal, round, and reactive to light. Neck: No JVD present. No tracheal deviation present. Cardiovascular: regular rhythm, S1&S2 and intact distal pulses. Pulmonary/Chest: bilateral breath sounds. No wheezes, rhonchi, rales or tenderness. Abdominal: Soft. Bowel sounds are normal, no distension and no tenderness to palpation. Musculoskeletal: No edema and no tenderness. Neurological: Non focal.    Assessment:   1. Facial trauma  2. ETOH withdrawal  3. Acute encephalopathy 2/2 above  4. Seizure 2/2 ETOH withdrawal    Plan:     VS reviewed as above. Stress ulcer, DVT, and line protocols are being followed if not contraindicated. Currently restrained to prevent pulling of devices.   Continue Precedex drip  Start Libirium   Continue SIWA protocol  On multi vit   Spoke to father at bedside  Discussed with ICU team    Total critical care time caring for this patient with life threatening illness, including direct patient contact, management of life support systems, review of data including imaging and labs, discussions with other team members and physicians is at least 34 minutes so far today, excluding procedures.

## 2018-12-11 NOTE — CARE COORDINATION
Discharge planning-    Copy of insurance card obtained [facility]). Copy sent to Kel[facility]/ Financial Counselor; copy also sent to [facility]. Copy placed in the patient's hard chart. Addendum: Received message from Kel[facility]/ Financial Counselor. Patient's [employer] card does not show as effective until 1/1/19. Will follow-up. Plan- Family interested in residential ETOH treatment. [facility] to [facility] ([facility]). Will need follow-up once the patient is alert.     Will continue to follow for support and discharge planning.    -Ursula Jones, MSW, LSW

## 2018-12-11 NOTE — PROGRESS NOTES
Reassessment completed, see doc flowsheet for details. VSS. NSR. Continuing to wean precedex. CIWA-10, PRN ativan given. Family at bedside. Fall and safety precautions in place.  [facility], RN, BSN

## 2018-12-12 PROCEDURE — 2580000003 HC RX 258: Performed by: INTERNAL MEDICINE

## 2018-12-12 PROCEDURE — 90686 IIV4 VACC NO PRSV 0.5 ML IM: CPT | Performed by: HOSPITALIST

## 2018-12-12 PROCEDURE — 99291 CRITICAL CARE FIRST HOUR: CPT | Performed by: INTERNAL MEDICINE

## 2018-12-12 PROCEDURE — 6370000000 HC RX 637 (ALT 250 FOR IP): Performed by: FAMILY MEDICINE

## 2018-12-12 PROCEDURE — 2580000003 HC RX 258: Performed by: HOSPITALIST

## 2018-12-12 PROCEDURE — 2580000003 HC RX 258: Performed by: FAMILY MEDICINE

## 2018-12-12 PROCEDURE — 6360000002 HC RX W HCPCS

## 2018-12-12 PROCEDURE — 6360000002 HC RX W HCPCS: Performed by: INTERNAL MEDICINE

## 2018-12-12 PROCEDURE — 6360000002 HC RX W HCPCS: Performed by: PEDIATRICS

## 2018-12-12 PROCEDURE — 6360000002 HC RX W HCPCS: Performed by: PHYSICIAN ASSISTANT

## 2018-12-12 PROCEDURE — 2500000003 HC RX 250 WO HCPCS: Performed by: INTERNAL MEDICINE

## 2018-12-12 PROCEDURE — 87086 URINE CULTURE/COLONY COUNT: CPT

## 2018-12-12 PROCEDURE — 6370000000 HC RX 637 (ALT 250 FOR IP): Performed by: HOSPITALIST

## 2018-12-12 PROCEDURE — G0008 ADMIN INFLUENZA VIRUS VAC: HCPCS | Performed by: HOSPITALIST

## 2018-12-12 PROCEDURE — 51702 INSERT TEMP BLADDER CATH: CPT

## 2018-12-12 PROCEDURE — 6370000000 HC RX 637 (ALT 250 FOR IP): Performed by: INTERNAL MEDICINE

## 2018-12-12 PROCEDURE — 6360000002 HC RX W HCPCS: Performed by: HOSPITALIST

## 2018-12-12 PROCEDURE — 6360000002 HC RX W HCPCS: Performed by: FAMILY MEDICINE

## 2018-12-12 PROCEDURE — 2000000000 HC ICU R&B

## 2018-12-12 RX ORDER — MIDAZOLAM HYDROCHLORIDE 1 MG/ML
4 INJECTION INTRAMUSCULAR; INTRAVENOUS ONCE
Status: COMPLETED | OUTPATIENT
Start: 2018-12-12 | End: 2018-12-12

## 2018-12-12 RX ORDER — LORAZEPAM 2 MG/ML
10 INJECTION INTRAMUSCULAR ONCE
Status: COMPLETED | OUTPATIENT
Start: 2018-12-12 | End: 2018-12-12

## 2018-12-12 RX ORDER — DEXTROSE AND SODIUM CHLORIDE 5; .45 G/100ML; G/100ML
INJECTION, SOLUTION INTRAVENOUS CONTINUOUS
Status: DISCONTINUED | OUTPATIENT
Start: 2018-12-12 | End: 2018-12-14

## 2018-12-12 RX ORDER — ACETAMINOPHEN 325 MG/1
650 TABLET ORAL EVERY 4 HOURS PRN
Status: DISCONTINUED | OUTPATIENT
Start: 2018-12-12 | End: 2018-12-15 | Stop reason: HOSPADM

## 2018-12-12 RX ORDER — MIDAZOLAM HYDROCHLORIDE 1 MG/ML
INJECTION INTRAMUSCULAR; INTRAVENOUS
Status: COMPLETED
Start: 2018-12-12 | End: 2018-12-12

## 2018-12-12 RX ORDER — LORAZEPAM 2 MG/ML
INJECTION INTRAMUSCULAR
Status: DISPENSED
Start: 2018-12-12 | End: 2018-12-12

## 2018-12-12 RX ADMIN — Medication 10 ML: at 21:05

## 2018-12-12 RX ADMIN — LORAZEPAM 4 MG: 2 INJECTION INTRAMUSCULAR; INTRAVENOUS at 04:15

## 2018-12-12 RX ADMIN — DEXMEDETOMIDINE HYDROCHLORIDE 1 MCG/KG/HR: 100 INJECTION, SOLUTION INTRAVENOUS at 19:48

## 2018-12-12 RX ADMIN — MIDAZOLAM HYDROCHLORIDE 4 MG: 1 INJECTION, SOLUTION INTRAMUSCULAR; INTRAVENOUS at 03:47

## 2018-12-12 RX ADMIN — DEXMEDETOMIDINE HYDROCHLORIDE 1 MCG/KG/HR: 100 INJECTION, SOLUTION INTRAVENOUS at 13:11

## 2018-12-12 RX ADMIN — LORAZEPAM 3 MG: 2 INJECTION INTRAMUSCULAR; INTRAVENOUS at 08:02

## 2018-12-12 RX ADMIN — THIAMINE HYDROCHLORIDE 200 MG: 100 INJECTION, SOLUTION INTRAMUSCULAR; INTRAVENOUS at 15:03

## 2018-12-12 RX ADMIN — LORAZEPAM 4 MG: 2 INJECTION INTRAMUSCULAR; INTRAVENOUS at 00:27

## 2018-12-12 RX ADMIN — LORAZEPAM 1 MG: 1 TABLET ORAL at 20:54

## 2018-12-12 RX ADMIN — DEXTROSE AND SODIUM CHLORIDE: 5; 450 INJECTION, SOLUTION INTRAVENOUS at 10:32

## 2018-12-12 RX ADMIN — CHLORDIAZEPOXIDE HYDROCHLORIDE 25 MG: 25 CAPSULE ORAL at 20:54

## 2018-12-12 RX ADMIN — ACETAMINOPHEN 650 MG: 325 TABLET, FILM COATED ORAL at 20:55

## 2018-12-12 RX ADMIN — DEXMEDETOMIDINE HYDROCHLORIDE 1 MCG/KG/HR: 100 INJECTION, SOLUTION INTRAVENOUS at 03:56

## 2018-12-12 RX ADMIN — LORAZEPAM 10 MG: 2 INJECTION INTRAMUSCULAR; INTRAVENOUS at 05:17

## 2018-12-12 RX ADMIN — LORAZEPAM 3 MG: 2 INJECTION INTRAMUSCULAR; INTRAVENOUS at 12:03

## 2018-12-12 RX ADMIN — LORAZEPAM 4 MG: 2 INJECTION INTRAMUSCULAR; INTRAVENOUS at 05:50

## 2018-12-12 RX ADMIN — INFLUENZA A VIRUS A/MICHIGAN/45/2015 X-275 (H1N1) ANTIGEN (FORMALDEHYDE INACTIVATED), INFLUENZA A VIRUS A/SINGAPORE/INFIMH-16-0019/2016 IVR-186 (H3N2) ANTIGEN (FORMALDEHYDE INACTIVATED), INFLUENZA B VIRUS B/PHUKET/3073/2013 ANTIGEN (FORMALDEHYDE INACTIVATED), AND INFLUENZA B VIRUS B/MARYLAND/15/2016 BX-69A ANTIGEN (FORMALDEHYDE INACTIVATED) 0.5 ML: 15; 15; 15; 15 INJECTION, SUSPENSION INTRAMUSCULAR at 10:29

## 2018-12-12 RX ADMIN — LORAZEPAM 4 MG: 2 INJECTION INTRAMUSCULAR; INTRAVENOUS at 02:15

## 2018-12-12 RX ADMIN — HYDRALAZINE HYDROCHLORIDE 10 MG: 20 INJECTION INTRAMUSCULAR; INTRAVENOUS at 10:35

## 2018-12-12 RX ADMIN — LORAZEPAM 3 MG: 2 INJECTION INTRAMUSCULAR; INTRAVENOUS at 03:22

## 2018-12-12 RX ADMIN — DEXTROSE AND SODIUM CHLORIDE: 5; 450 INJECTION, SOLUTION INTRAVENOUS at 20:42

## 2018-12-12 RX ADMIN — LORAZEPAM 4 MG: 2 INJECTION INTRAMUSCULAR; INTRAVENOUS at 06:49

## 2018-12-12 RX ADMIN — Medication 10 ML: at 10:33

## 2018-12-12 RX ADMIN — MIDAZOLAM HYDROCHLORIDE 4 MG: 1 INJECTION, SOLUTION INTRAMUSCULAR; INTRAVENOUS at 04:05

## 2018-12-12 ASSESSMENT — PAIN DESCRIPTION - DESCRIPTORS
DESCRIPTORS: HEADACHE
DESCRIPTORS: HEADACHE

## 2018-12-12 ASSESSMENT — PAIN SCALES - GENERAL
PAINLEVEL_OUTOF10: 5
PAINLEVEL_OUTOF10: 3
PAINLEVEL_OUTOF10: 3
PAINLEVEL_OUTOF10: 0
PAINLEVEL_OUTOF10: 3

## 2018-12-12 ASSESSMENT — PAIN DESCRIPTION - LOCATION
LOCATION: HEAD

## 2018-12-12 ASSESSMENT — PAIN DESCRIPTION - PAIN TYPE
TYPE: ACUTE PAIN

## 2018-12-12 ASSESSMENT — PAIN SCALES - WONG BAKER
WONGBAKER_NUMERICALRESPONSE: 0

## 2018-12-12 NOTE — PROGRESS NOTES
[facility] PROGRESS NOTE    12/12/2018 8:50 AM        Name: Zuleima Abraham . Admitted: 12/9/2018  Primary Care Provider: No primary care provider on file. (Tel: None)                        Hospital course:  Km Lemus a 39 y. o. male current every day etoh use, and nicotine use presents after a fall , LOC and seizure like episode. Per ER note The pt had an unwitnessed episode of seizure. The pt was unable to recall events surrounding his fall at work. 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 He has a hematoma of the right sided forehead extending to right eye. CIWA scores constantly high, due to imminent DT, pt transferred to ICU on 12/10/18. Still need to be on precedex, having hallucinations. , Alcantara catheter placed on 12/12      Subjective:  . No acute events overnight. Resting well. Pain control. Diet ok. Labs reviewed  Denies any chest pain sob.      Reviewed interval ancillary notes    Current Medications    LORazepam (ATIVAN) 2 MG/ML injection    hydrALAZINE (APRESOLINE) injection 10 mg Q6H PRN   chlordiazePOXIDE (LIBRIUM) capsule 25 mg TID   influenza quadrivalent split vaccine (FLUZONE;FLUARIX;FLULAVAL;AFLURIA) injection 0.5 mL Once   nicotine (NICODERM CQ) 21 MG/24HR 1 patch Daily   thiamine (B-1) 200 mg in sodium chloride 0.9 % 100 mL IVPB Q24H   dexmedetomidine (PRECEDEX) 400 mcg in sodium chloride 0.9 % 100 mL infusion Continuous   LORazepam (ATIVAN) tablet 1 mg Q1H PRN   Or    LORazepam (ATIVAN) injection 1 mg Q1H PRN   Or    LORazepam (ATIVAN) tablet 2 mg Q1H PRN   Or    LORazepam (ATIVAN) injection 2 mg Q1H PRN   Or    LORazepam (ATIVAN) tablet 3 mg Q1H PRN   Or    LORazepam (ATIVAN) injection 3 mg Q1H PRN   Or    LORazepam (ATIVAN) tablet 4 mg Q1H PRN   Or    LORazepam (ATIVAN) injection 4 mg Q1H PRN

## 2018-12-12 NOTE — PROGRESS NOTES
flush 0.9 % injection 10 mL, 10 mL, Intravenous, 2 times per day  sodium chloride flush 0.9 % injection 10 mL, 10 mL, Intravenous, PRN  magnesium hydroxide (MILK OF MAGNESIA) 400 MG/5ML suspension 30 mL, 30 mL, Oral, Daily PRN  ondansetron (ZOFRAN) injection 4 mg, 4 mg, Intravenous, Q6H PRN  LORazepam (ATIVAN) tablet 1 mg, 1 mg, Oral, Q1H PRN **OR** LORazepam (ATIVAN) injection 1 mg, 1 mg, Intravenous, Q1H PRN **OR** LORazepam (ATIVAN) tablet 2 mg, 2 mg, Oral, Q1H PRN **OR** LORazepam (ATIVAN) injection 2 mg, 2 mg, Intravenous, Q1H PRN **OR** LORazepam (ATIVAN) tablet 3 mg, 3 mg, Oral, Q1H PRN **OR** LORazepam (ATIVAN) injection 3 mg, 3 mg, Intravenous, Q1H PRN **OR** LORazepam (ATIVAN) tablet 4 mg, 4 mg, Oral, Q1H PRN **OR** LORazepam (ATIVAN) injection 4 mg, 4 mg, Intravenous, Q1H PRN  atorvastatin (LIPITOR) tablet 40 mg, 40 mg, Oral, Nightly  potassium chloride (KLOR-CON M) extended release tablet 40 mEq, 40 mEq, Oral, PRN **OR** potassium bicarb-citric acid (EFFER-K) effervescent tablet 40 mEq, 40 mEq, Oral, PRN **OR** potassium chloride 10 mEq/100 mL IVPB (Peripheral Line), 10 mEq, Intravenous, PRN  magnesium sulfate 1 g in dextrose 5% 100 mL IVPB, 1 g, Intravenous, PRN    CBC:  Recent Labs      12/09/18   1322  12/11/18   0413   WBC  5.9  6.1   RBC  3.71*  3.73*   HGB  13.0*  12.9*   HCT  37.6*  37.5*   PLT  154  144   MCV  101.5*  100.6*   MCH  35.1*  34.7*   MCHC  34.6  34.5   RDW  13.8  13.3      BMP:  Recent Labs      12/09/18   1321  12/10/18   0457  12/11/18   0413   NA  138  139  138   K  3.3*  3.0*  3.3*  3.3*   CL  93*  98*  97*   CO2  23  26  22   BUN  10  7  7   CREATININE  0.6*  <0.5*  <0.5*   CALCIUM  9.3  8.8  8.7   GLUCOSE  169*  80  81        Radiology Review:  Pertinent images / reports were reviewed as a part of this visit. Physical Exam   Constitutional: lethargic and agitated  HENT: No oropharyngeal exudate.  ecchymosis and trauma on R forehead and around R eye   Eyes: Pupils are equal, round, and reactive to light. Neck: No JVD present. No tracheal deviation present. Cardiovascular: regular rhythm, S1&S2 and intact distal pulses. Pulmonary/Chest: bilateral breath sounds. No wheezes, rhonchi, rales or tenderness. Abdominal: Soft. Bowel sounds are normal, no distension and no tenderness to palpation. Musculoskeletal: No edema and no tenderness. Neurological: Non focal.    Assessment:   1. Facial trauma  2. ETOH withdrawal  3. Acute encephalopathy 2/2 above  4. Seizure 2/2 ETOH withdrawal    Plan:     VS reviewed as above. Stress ulcer, DVT, and line protocols are being followed if not contraindicated. Currently restrained to prevent pulling of devices.   Continue Precedex drip  Continue SIWA protocol and Libirium   On multi vit, start IVF  Discussed with ICU team    Total critical care time caring for this patient with life threatening illness, including direct patient contact, management of life support systems, review of data including imaging and labs, discussions with other team members and physicians is at least 31 minutes so far today, excluding procedures.

## 2018-12-12 NOTE — PROGRESS NOTES
Patient thrashing in bed, blood noted coming from bridge of patients nose. Patient had hit the bed rail with his head while thrashing around.

## 2018-12-13 PROCEDURE — 6370000000 HC RX 637 (ALT 250 FOR IP): Performed by: INTERNAL MEDICINE

## 2018-12-13 PROCEDURE — 6360000002 HC RX W HCPCS: Performed by: PHYSICIAN ASSISTANT

## 2018-12-13 PROCEDURE — 2580000003 HC RX 258: Performed by: INTERNAL MEDICINE

## 2018-12-13 PROCEDURE — 2580000003 HC RX 258: Performed by: FAMILY MEDICINE

## 2018-12-13 PROCEDURE — 2000000000 HC ICU R&B

## 2018-12-13 PROCEDURE — 99233 SBSQ HOSP IP/OBS HIGH 50: CPT | Performed by: INTERNAL MEDICINE

## 2018-12-13 PROCEDURE — 2500000003 HC RX 250 WO HCPCS: Performed by: INTERNAL MEDICINE

## 2018-12-13 PROCEDURE — 2580000003 HC RX 258: Performed by: HOSPITALIST

## 2018-12-13 PROCEDURE — 6360000002 HC RX W HCPCS: Performed by: HOSPITALIST

## 2018-12-13 PROCEDURE — 6370000000 HC RX 637 (ALT 250 FOR IP): Performed by: FAMILY MEDICINE

## 2018-12-13 PROCEDURE — 6370000000 HC RX 637 (ALT 250 FOR IP): Performed by: HOSPITALIST

## 2018-12-13 RX ADMIN — CHLORDIAZEPOXIDE HYDROCHLORIDE 25 MG: 25 CAPSULE ORAL at 14:36

## 2018-12-13 RX ADMIN — CHLORDIAZEPOXIDE HYDROCHLORIDE 25 MG: 25 CAPSULE ORAL at 10:12

## 2018-12-13 RX ADMIN — Medication 10 ML: at 10:11

## 2018-12-13 RX ADMIN — DEXMEDETOMIDINE HYDROCHLORIDE 1 MCG/KG/HR: 100 INJECTION, SOLUTION INTRAVENOUS at 01:23

## 2018-12-13 RX ADMIN — LORAZEPAM 1 MG: 1 TABLET ORAL at 20:44

## 2018-12-13 RX ADMIN — DEXTROSE AND SODIUM CHLORIDE: 5; 450 INJECTION, SOLUTION INTRAVENOUS at 05:15

## 2018-12-13 RX ADMIN — DEXMEDETOMIDINE HYDROCHLORIDE 0.6 MCG/KG/HR: 100 INJECTION, SOLUTION INTRAVENOUS at 18:38

## 2018-12-13 RX ADMIN — CHLORDIAZEPOXIDE HYDROCHLORIDE 25 MG: 25 CAPSULE ORAL at 20:44

## 2018-12-13 RX ADMIN — THIAMINE HYDROCHLORIDE 200 MG: 100 INJECTION, SOLUTION INTRAMUSCULAR; INTRAVENOUS at 15:13

## 2018-12-13 RX ADMIN — DEXMEDETOMIDINE HYDROCHLORIDE 0.8 MCG/KG/HR: 100 INJECTION, SOLUTION INTRAVENOUS at 10:11

## 2018-12-13 RX ADMIN — LORAZEPAM 2 MG: 2 INJECTION INTRAMUSCULAR; INTRAVENOUS at 00:18

## 2018-12-13 RX ADMIN — ACETAMINOPHEN 650 MG: 325 TABLET, FILM COATED ORAL at 20:43

## 2018-12-13 RX ADMIN — DEXTROSE AND SODIUM CHLORIDE: 5; 450 INJECTION, SOLUTION INTRAVENOUS at 17:54

## 2018-12-13 RX ADMIN — ATORVASTATIN CALCIUM 40 MG: 80 TABLET, FILM COATED ORAL at 20:44

## 2018-12-13 RX ADMIN — LORAZEPAM 2 MG: 1 TABLET ORAL at 23:47

## 2018-12-13 RX ADMIN — LORAZEPAM 1 MG: 1 TABLET ORAL at 17:58

## 2018-12-13 ASSESSMENT — PAIN SCALES - WONG BAKER
WONGBAKER_NUMERICALRESPONSE: 0

## 2018-12-13 ASSESSMENT — PAIN SCALES - GENERAL
PAINLEVEL_OUTOF10: 0
PAINLEVEL_OUTOF10: 3
PAINLEVEL_OUTOF10: 0
PAINLEVEL_OUTOF10: 3
PAINLEVEL_OUTOF10: 0

## 2018-12-13 ASSESSMENT — PAIN DESCRIPTION - PAIN TYPE: TYPE: ACUTE PAIN

## 2018-12-13 ASSESSMENT — PAIN DESCRIPTION - LOCATION: LOCATION: HEAD

## 2018-12-13 NOTE — PROGRESS NOTES
dextrose 5% 100 mL IVPB, 1 g, Intravenous, PRN    CBC:  Recent Labs      12/11/18   0413   WBC  6.1   RBC  3.73*   HGB  12.9*   HCT  37.5*   PLT  144   MCV  100.6*   MCH  34.7*   MCHC  34.5   RDW  13.3      BMP:  Recent Labs      12/11/18   0413   NA  138   K  3.3*  3.3*   CL  97*   CO2  22   BUN  7   CREATININE  <0.5*   CALCIUM  8.7   GLUCOSE  81        Radiology Review:  Pertinent images / reports were reviewed as a part of this visit. Physical Exam   Constitutional: lethargic and agitated  HENT: No oropharyngeal exudate. ecchymosis and trauma on R forehead and around R eye   Eyes: Pupils are equal, round, and reactive to light. Neck: No JVD present. No tracheal deviation present. Cardiovascular: regular rhythm, S1&S2 and intact distal pulses. Pulmonary/Chest: bilateral breath sounds. No wheezes, rhonchi, rales or tenderness. Abdominal: Soft. Bowel sounds are normal, no distension and no tenderness to palpation. Musculoskeletal: No edema and no tenderness. Neurological: Non focal.    Assessment:   1. Facial trauma  2. ETOH withdrawal  3. Acute encephalopathy 2/2 above  4. Seizure 2/2 ETOH withdrawal    Plan:     VS reviewed as above. Stress ulcer, DVT, and line protocols are being followed if not contraindicated. Currently restrained to prevent pulling of devices.   Try to wean Precedex drip  Continue SIWA protocol and Libirium   Continue IVF  Try to start diet  Discussed with ICU RN

## 2018-12-13 NOTE — PROGRESS NOTES
[facility] PROGRESS NOTE    12/13/2018 8:54 AM        Name: Alfredo Finch . Admitted: 12/9/2018  Primary Care Provider: No primary care provider on file. (Tel: None)                        Hospital course:  Cleta Lunch a 39 y. o. male current every day etoh use, and nicotine use presents after a fall , LOC and seizure like episode. Per ER note The pt had an unwitnessed episode of seizure. The pt was unable to recall events surrounding his fall at work. 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 He has a hematoma of the right sided forehead extending to right eye. CIWA scores constantly high, due to imminent DT, pt transferred to ICU on 12/10/18. Still need to be on precedex, having hallucinations. , Alcantara catheter placed on 12/12     Subjective:  Patient seen in \"seen red dots on everybody's face\"          Current Medications    dextrose 5 % and 0.45 % sodium chloride infusion Continuous   acetaminophen (TYLENOL) tablet 650 mg Q4H PRN   hydrALAZINE (APRESOLINE) injection 10 mg Q6H PRN   chlordiazePOXIDE (LIBRIUM) capsule 25 mg TID   nicotine (NICODERM CQ) 21 MG/24HR 1 patch Daily   thiamine (B-1) 200 mg in sodium chloride 0.9 % 100 mL IVPB Q24H   dexmedetomidine (PRECEDEX) 400 mcg in sodium chloride 0.9 % 100 mL infusion Continuous   LORazepam (ATIVAN) tablet 1 mg Q1H PRN   Or    LORazepam (ATIVAN) injection 1 mg Q1H PRN   Or    LORazepam (ATIVAN) tablet 2 mg Q1H PRN   Or    LORazepam (ATIVAN) injection 2 mg Q1H PRN   Or    LORazepam (ATIVAN) tablet 3 mg Q1H PRN   Or    LORazepam (ATIVAN) injection 3 mg Q1H PRN   Or    LORazepam (ATIVAN) tablet 4 mg Q1H PRN   Or    LORazepam (ATIVAN) injection 4 mg Q1H PRN   sodium chloride flush 0.9 % injection 10 mL 2 times per day   sodium chloride flush 0.9 % injection 10 mL PRN   magnesium

## 2018-12-13 NOTE — PROGRESS NOTES
Reassessment complete, see doc flowsheets. CIWA \"12. \" 2mg IVP Ativan administered, see eMAR. Precedex continues to infuse at 1mcg/kg/hr. VSS. Bilateral wrist restraints remain in place d/t pt pulling at lines/tubes.

## 2018-12-14 LAB
ANION GAP SERPL CALCULATED.3IONS-SCNC: 11 MMOL/L (ref 3–16)
BUN BLDV-MCNC: 7 MG/DL (ref 7–20)
CALCIUM SERPL-MCNC: 8.9 MG/DL (ref 8.3–10.6)
CHLORIDE BLD-SCNC: 105 MMOL/L (ref 99–110)
CO2: 25 MMOL/L (ref 21–32)
CREAT SERPL-MCNC: 0.6 MG/DL (ref 0.9–1.3)
GFR AFRICAN AMERICAN: >60
GFR NON-AFRICAN AMERICAN: >60
GLUCOSE BLD-MCNC: 136 MG/DL (ref 70–99)
HCT VFR BLD CALC: 36.1 % (ref 40.5–52.5)
HEMOGLOBIN: 12.4 G/DL (ref 13.5–17.5)
MAGNESIUM: 1.3 MG/DL (ref 1.8–2.4)
MCH RBC QN AUTO: 34.9 PG (ref 26–34)
MCHC RBC AUTO-ENTMCNC: 34.3 G/DL (ref 31–36)
MCV RBC AUTO: 101.8 FL (ref 80–100)
PDW BLD-RTO: 13 % (ref 12.4–15.4)
PHOSPHORUS: 4.7 MG/DL (ref 2.5–4.9)
PLATELET # BLD: 199 K/UL (ref 135–450)
PMV BLD AUTO: 7.8 FL (ref 5–10.5)
POTASSIUM SERPL-SCNC: 3.3 MMOL/L (ref 3.5–5.1)
RBC # BLD: 3.54 M/UL (ref 4.2–5.9)
SODIUM BLD-SCNC: 141 MMOL/L (ref 136–145)
URINE CULTURE, ROUTINE: NORMAL
WBC # BLD: 5.1 K/UL (ref 4–11)

## 2018-12-14 PROCEDURE — 2000000000 HC ICU R&B

## 2018-12-14 PROCEDURE — 85027 COMPLETE CBC AUTOMATED: CPT

## 2018-12-14 PROCEDURE — 99255 IP/OBS CONSLTJ NEW/EST HI 80: CPT | Performed by: PSYCHIATRY & NEUROLOGY

## 2018-12-14 PROCEDURE — 6370000000 HC RX 637 (ALT 250 FOR IP): Performed by: INTERNAL MEDICINE

## 2018-12-14 PROCEDURE — 2580000003 HC RX 258: Performed by: INTERNAL MEDICINE

## 2018-12-14 PROCEDURE — 6360000002 HC RX W HCPCS: Performed by: PEDIATRICS

## 2018-12-14 PROCEDURE — 6370000000 HC RX 637 (ALT 250 FOR IP): Performed by: NURSE PRACTITIONER

## 2018-12-14 PROCEDURE — 99233 SBSQ HOSP IP/OBS HIGH 50: CPT | Performed by: INTERNAL MEDICINE

## 2018-12-14 PROCEDURE — 6360000002 HC RX W HCPCS: Performed by: HOSPITALIST

## 2018-12-14 PROCEDURE — 2580000003 HC RX 258: Performed by: FAMILY MEDICINE

## 2018-12-14 PROCEDURE — 36415 COLL VENOUS BLD VENIPUNCTURE: CPT

## 2018-12-14 PROCEDURE — 6370000000 HC RX 637 (ALT 250 FOR IP): Performed by: FAMILY MEDICINE

## 2018-12-14 PROCEDURE — 80048 BASIC METABOLIC PNL TOTAL CA: CPT

## 2018-12-14 PROCEDURE — 94760 N-INVAS EAR/PLS OXIMETRY 1: CPT

## 2018-12-14 PROCEDURE — 84100 ASSAY OF PHOSPHORUS: CPT

## 2018-12-14 PROCEDURE — 6370000000 HC RX 637 (ALT 250 FOR IP): Performed by: HOSPITALIST

## 2018-12-14 PROCEDURE — 83735 ASSAY OF MAGNESIUM: CPT

## 2018-12-14 RX ORDER — THIAMINE HCL 100 MG
200 TABLET ORAL DAILY
Status: DISCONTINUED | OUTPATIENT
Start: 2018-12-14 | End: 2018-12-15 | Stop reason: HOSPADM

## 2018-12-14 RX ORDER — LANOLIN ALCOHOL/MO/W.PET/CERES
3 CREAM (GRAM) TOPICAL NIGHTLY PRN
Status: DISCONTINUED | OUTPATIENT
Start: 2018-12-14 | End: 2018-12-15 | Stop reason: HOSPADM

## 2018-12-14 RX ORDER — MAGNESIUM SULFATE IN WATER 40 MG/ML
4 INJECTION, SOLUTION INTRAVENOUS ONCE
Status: COMPLETED | OUTPATIENT
Start: 2018-12-14 | End: 2018-12-14

## 2018-12-14 RX ORDER — CHLORDIAZEPOXIDE HYDROCHLORIDE 25 MG/1
25 CAPSULE, GELATIN COATED ORAL 2 TIMES DAILY
Status: DISCONTINUED | OUTPATIENT
Start: 2018-12-14 | End: 2018-12-15 | Stop reason: HOSPADM

## 2018-12-14 RX ADMIN — CHLORDIAZEPOXIDE HYDROCHLORIDE 25 MG: 25 CAPSULE ORAL at 20:03

## 2018-12-14 RX ADMIN — HYDRALAZINE HYDROCHLORIDE 10 MG: 20 INJECTION INTRAMUSCULAR; INTRAVENOUS at 08:47

## 2018-12-14 RX ADMIN — ATORVASTATIN CALCIUM 40 MG: 80 TABLET, FILM COATED ORAL at 20:02

## 2018-12-14 RX ADMIN — Medication 10 ML: at 20:03

## 2018-12-14 RX ADMIN — MELATONIN TAB 3 MG 3 MG: 3 TAB at 20:40

## 2018-12-14 RX ADMIN — METOPROLOL TARTRATE 25 MG: 25 TABLET, FILM COATED ORAL at 09:03

## 2018-12-14 RX ADMIN — LORAZEPAM 2 MG: 1 TABLET ORAL at 05:05

## 2018-12-14 RX ADMIN — DEXTROSE AND SODIUM CHLORIDE: 5; 450 INJECTION, SOLUTION INTRAVENOUS at 03:32

## 2018-12-14 RX ADMIN — METOPROLOL TARTRATE 25 MG: 25 TABLET, FILM COATED ORAL at 20:02

## 2018-12-14 RX ADMIN — Medication 200 MG: at 14:13

## 2018-12-14 RX ADMIN — Medication 10 ML: at 08:33

## 2018-12-14 RX ADMIN — CHLORDIAZEPOXIDE HYDROCHLORIDE 25 MG: 25 CAPSULE ORAL at 08:34

## 2018-12-14 RX ADMIN — LORAZEPAM 2 MG: 1 TABLET ORAL at 08:34

## 2018-12-14 RX ADMIN — POTASSIUM CHLORIDE 40 MEQ: 1500 TABLET, EXTENDED RELEASE ORAL at 09:04

## 2018-12-14 RX ADMIN — MAGNESIUM SULFATE HEPTAHYDRATE 4 G: 40 INJECTION, SOLUTION INTRAVENOUS at 06:23

## 2018-12-14 ASSESSMENT — PAIN SCALES - GENERAL
PAINLEVEL_OUTOF10: 0

## 2018-12-14 ASSESSMENT — PAIN SCALES - WONG BAKER: WONGBAKER_NUMERICALRESPONSE: 0

## 2018-12-14 NOTE — PROGRESS NOTES
Shift assessment complete, see doc flowsheets. Pt is A/Ox4. Able to follow commands. Lungs are clear/ diminished, tolerating RA. Pt states he \"cannot breathe through his nose\" and asks his mom to bring Robitussin. This RN speaks with pt's mom and encourages her to not bring home medication to hospital that we will order it here. Pt declines Robitussin at this time. SR on monitor. + BS in all quadrants. Alcantara draining yellow, clear urine. Active all extremities but generalized weakness noted. BUE with tremors. 1mg Ativan administered PO, see CIWA score. Skin is warm and dry. Bruising to bilateral eyes, abrasion to bridge of nose, bump to R forehead. Pt c/o pain to head- Tylenol ordered. L forearm PIV infusing without complications. R forearm PIV red and hard, PIV removed at this time.      POC discussed with pt includes: d/c planning home, CIWA, monitor VS, monitor for s/s of seizures, comfort and safety.

## 2018-12-14 NOTE — PROGRESS NOTES
Reassessment complete, see doc flowsheets. CIWA \"13,\" 2mg PO Ativan administered. Precedex gtt being weaned, see eMAR. Pt is A/Ox4 and is calm and cooperative. Pt eats turkey sandwich and chips. Alcantara draining large amount of yellow, clear urine. No s/s of seizure activity. Will continue to monitor.

## 2018-12-14 NOTE — PROGRESS NOTES
[facility] PROGRESS NOTE    12/14/2018 7:59 AM        Name: Kalin Vines . Admitted: 12/9/2018  Primary Care Provider: No primary care provider on file. (Tel: None)                        Hospital course:  Jamey Pack a 39 y. o. male current every day etoh use, and nicotine use presents after a fall , LOC and seizure like episode. Per ER note The pt had an unwitnessed episode of seizure. The pt was unable to recall events surrounding his fall at work. 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 He has a hematoma of the right sided forehead extending to right eye. CIWA scores constantly high, due to imminent DT, pt transferred to ICU on 12/10/18. Still need to be on precedex, having hallucinations. , Alcantara catheter placed on 12/12  Patient look better on 12/14, Alcantara catheter will be discontinued      Subjective:  He told the nursing staff earlier he is not interested in alcohol rehab program however now,  he told neurologist and myself he is interested rehab program      Current Medications    magnesium sulfate 4 g in 100 mL IVPB premix Once   dextrose 5 % and 0.45 % sodium chloride infusion Continuous   acetaminophen (TYLENOL) tablet 650 mg Q4H PRN   hydrALAZINE (APRESOLINE) injection 10 mg Q6H PRN   chlordiazePOXIDE (LIBRIUM) capsule 25 mg TID   nicotine (NICODERM CQ) 21 MG/24HR 1 patch Daily   thiamine (B-1) 200 mg in sodium chloride 0.9 % 100 mL IVPB Q24H   dexmedetomidine (PRECEDEX) 400 mcg in sodium chloride 0.9 % 100 mL infusion Continuous   LORazepam (ATIVAN) tablet 1 mg Q1H PRN   Or    LORazepam (ATIVAN) injection 1 mg Q1H PRN   Or    LORazepam (ATIVAN) tablet 2 mg Q1H PRN   Or    LORazepam (ATIVAN) injection 2 mg Q1H PRN   Or    LORazepam (ATIVAN) tablet 3 mg Q1H PRN   Or    LORazepam (ATIVAN) injection 3 mg Q1H PRN

## 2018-12-14 NOTE — CARE COORDINATION
Discharge planning-    Re consulted by the patient and family re: discharge planning needs. Spoke with the patient and his father/ Luiz Rhodes. Patient reports that he is \"considering\" going to [facility] at discharge for ETOH treatment. Patient does not answer this worker when this worker asks if the patient wishes to go at discharge. Provided the patient with additional information re: [facility], including their assessment/ intake paperwork. Discussed that [facility] has open intake Monday- Friday, they prefer their patients to arrive early. Discussed that [facility] does not treat with PO librium or ativan- patient/ family acknowledge an understanding of this information, patient reports that he does not plan on taking these medicines, even if prescribed, at discharge. Patient's father reports that he is clarifying the patient's insurance coverage information- however, it appears that the patient may not have coverage through [address] until 1/1/19. Patient has been assessed by the Financial Counselor, and is eligible for financial assistance + dhara meds. Patient/ family identify no other needs at this time. Called [facility], left voicemail message with intake. Facesheet; H&P; Hospitalist/ Specialist notes; labs; [facility]; vitals sent via fax. Plan- Home with family vs ETOH treatment- referrals sent to [facility]; [facility] (\[facility]"), for if the patient chooses treatment.     Will continue to follow for support and discharge planning.    -Daniel Queen, MSW, LSW

## 2018-12-14 NOTE — PROGRESS NOTES
Discussed possible outpatient treatment upon d/c. Pt declines wanting treatment stating, \"I don't want to quit drinking. I've been drinking since I was 14years old. \" Pt plans to return to the house he shares with his mother after d/c. Mom at bedside states, \"His father and I would prefer he gets help after he is discharged. We want him to stop this alcoholism. \"

## 2018-12-14 NOTE — PROGRESS NOTES
P Pulmonary and Critical Care  ICU F/U Note    Subjective:   CC / HPI: ETOH withdrawal, DTs  Patient is awake and alert this am, off Precedex drip  Still with high BP    PMH: Reviewed, no changes    PSH: Reviewed, no changes    REVIEW OF SYSTEMS:   CONSTITUTIONAL:  negative for fevers  EYES:  negative for eye discharge, visual disturbance and icterus  HEENT:  negative for ear drainage,  nasal congestion, epistaxis and snoring  RESPIRATORY:  See HPI  CARDIOVASCULAR:  negative for edema, syncope  GASTROINTESTINAL:  negative for nausea, vomiting, diarrhea, constipation, blood in stool and abdominal pain  GENITOURINARY:  negative for hematuria  HEMATOLOGIC/LYMPHATIC:  negative for bleeding   ALLERGIC/IMMUNOLOGIC:  negative for angioedema, anaphylaxis and drug reactions  ENDOCRINE:  negative for weight changes   MUSCULOSKELETAL:  negative for joint swelling  NEUROLOGICAL:  negative for unilateral weakness  PSYCHIATRIC/BEHAVIORAL: negative for  agitation.        Objective:   Data Review:  Vital Signs: BP (!) 152/103   Pulse 97   Temp 98.2 °F (36.8 °C) (Temporal)   Resp 17   Ht 5' 9\" (1.753 m)   Wt 152 lb 5.4 oz (69.1 kg)   SpO2 96%   BMI 22.50 kg/m²     24 Hour I&O Review:     Intake/Output Summary (Last 24 hours) at 12/14/18 0892  Last data filed at 12/14/18 0299   Gross per 24 hour   Intake             3158 ml   Output             1900 ml   Net             1258 ml     Continuous Infusions:   dexmedetomidine (PRECEDEX) IV infusion Stopped (12/14/18 4874)     Current Medications: Current Facility-Administered Medications: magnesium sulfate 4 g in 100 mL IVPB premix, 4 g, Intravenous, Once  acetaminophen (TYLENOL) tablet 650 mg, 650 mg, Oral, Q4H PRN  hydrALAZINE (APRESOLINE) injection 10 mg, 10 mg, Intravenous, Q6H PRN  chlordiazePOXIDE (LIBRIUM) capsule 25 mg, 25 mg, Oral, TID  nicotine (NICODERM CQ) 21 MG/24HR 1 patch, 1 patch, Transdermal, Daily  thiamine (B-1) 200 mg in sodium chloride 0.9 % 100 mL IVPB, 200 PLT  199   MCV  101.8*   MCH  34.9*   MCHC  34.3   RDW  13.0      BMP:  Recent Labs      12/14/18   0415   NA  141   K  3.3*   CL  105   CO2  25   BUN  7   CREATININE  0.6*   CALCIUM  8.9   GLUCOSE  136*        Radiology Review:  Pertinent images / reports were reviewed as a part of this visit. Physical Exam   Constitutional: awake and alert  HENT: No oropharyngeal exudate. ecchymosis and trauma on R forehead and around R eye   Eyes: Pupils are equal, round, and reactive to light. Neck: No JVD present. No tracheal deviation present. Cardiovascular: regular rhythm, S1&S2 and intact distal pulses. Pulmonary/Chest: bilateral breath sounds. No wheezes, rhonchi, rales or tenderness. Abdominal: Soft. Bowel sounds are normal, no distension and no tenderness to palpation. Musculoskeletal: No edema and no tenderness. Neurological: Non focal.    Assessment:   1. Facial trauma  2. ETOH withdrawal  3. Acute encephalopathy 2/2 above  4. Seizure 2/2 ETOH withdrawal    Plan:     VS reviewed as above. Stress ulcer, DVT, and line protocols are being followed if not contraindicated.     Needs BP control  Off Precedex drip  Continue SIWA protocol and decrease Libirium   D/C IVF, remove gu  Discussed with ICU RN  OK to transfer out of ICU service, call with questions

## 2018-12-14 NOTE — PLAN OF CARE
Problem: Falls - Risk of:  Goal: Will remain free from falls  Will remain free from falls   Outcome: Ongoing      Problem: Pain:  Goal: Pain level will decrease  Pain level will decrease   Outcome: Ongoing  Pt c/o HA  Tylenol administered PRN    Problem: Discharge Planning:  Goal: Discharged to appropriate level of care  Discharged to appropriate level of care   Outcome: Ongoing  Pt requesting d/c home without ETOH tx      Problem: Nutrition Deficit:  Goal: Ability to achieve adequate nutritional intake will improve  Ability to achieve adequate nutritional intake will improve   Outcome: Ongoing  Pt tolerating general diet

## 2018-12-15 VITALS
HEIGHT: 69 IN | OXYGEN SATURATION: 99 % | HEART RATE: 101 BPM | RESPIRATION RATE: 8 BRPM | TEMPERATURE: 98.2 F | DIASTOLIC BLOOD PRESSURE: 91 MMHG | BODY MASS INDEX: 22.2 KG/M2 | WEIGHT: 149.91 LBS | SYSTOLIC BLOOD PRESSURE: 141 MMHG

## 2018-12-15 LAB
ANION GAP SERPL CALCULATED.3IONS-SCNC: 13 MMOL/L (ref 3–16)
BUN BLDV-MCNC: 8 MG/DL (ref 7–20)
CALCIUM SERPL-MCNC: 9.2 MG/DL (ref 8.3–10.6)
CHLORIDE BLD-SCNC: 102 MMOL/L (ref 99–110)
CO2: 25 MMOL/L (ref 21–32)
CREAT SERPL-MCNC: 0.6 MG/DL (ref 0.9–1.3)
GFR AFRICAN AMERICAN: >60
GFR NON-AFRICAN AMERICAN: >60
GLUCOSE BLD-MCNC: 102 MG/DL (ref 70–99)
HCT VFR BLD CALC: 36.2 % (ref 40.5–52.5)
HEMOGLOBIN: 12.4 G/DL (ref 13.5–17.5)
MAGNESIUM: 1.7 MG/DL (ref 1.8–2.4)
MCH RBC QN AUTO: 34.7 PG (ref 26–34)
MCHC RBC AUTO-ENTMCNC: 34.3 G/DL (ref 31–36)
MCV RBC AUTO: 101.2 FL (ref 80–100)
PDW BLD-RTO: 12.9 % (ref 12.4–15.4)
PHOSPHORUS: 4.5 MG/DL (ref 2.5–4.9)
PLATELET # BLD: 232 K/UL (ref 135–450)
PMV BLD AUTO: 7.6 FL (ref 5–10.5)
POTASSIUM SERPL-SCNC: 3.6 MMOL/L (ref 3.5–5.1)
RBC # BLD: 3.58 M/UL (ref 4.2–5.9)
SODIUM BLD-SCNC: 140 MMOL/L (ref 136–145)
WBC # BLD: 4.9 K/UL (ref 4–11)

## 2018-12-15 PROCEDURE — 97530 THERAPEUTIC ACTIVITIES: CPT

## 2018-12-15 PROCEDURE — G8989 SELF CARE D/C STATUS: HCPCS

## 2018-12-15 PROCEDURE — 80048 BASIC METABOLIC PNL TOTAL CA: CPT

## 2018-12-15 PROCEDURE — 2580000003 HC RX 258: Performed by: FAMILY MEDICINE

## 2018-12-15 PROCEDURE — 6360000002 HC RX W HCPCS: Performed by: PEDIATRICS

## 2018-12-15 PROCEDURE — G8980 MOBILITY D/C STATUS: HCPCS

## 2018-12-15 PROCEDURE — 36415 COLL VENOUS BLD VENIPUNCTURE: CPT

## 2018-12-15 PROCEDURE — 85027 COMPLETE CBC AUTOMATED: CPT

## 2018-12-15 PROCEDURE — G8978 MOBILITY CURRENT STATUS: HCPCS

## 2018-12-15 PROCEDURE — 6370000000 HC RX 637 (ALT 250 FOR IP): Performed by: HOSPITALIST

## 2018-12-15 PROCEDURE — 6370000000 HC RX 637 (ALT 250 FOR IP): Performed by: INTERNAL MEDICINE

## 2018-12-15 PROCEDURE — G8988 SELF CARE GOAL STATUS: HCPCS

## 2018-12-15 PROCEDURE — 97165 OT EVAL LOW COMPLEX 30 MIN: CPT

## 2018-12-15 PROCEDURE — 97161 PT EVAL LOW COMPLEX 20 MIN: CPT

## 2018-12-15 PROCEDURE — G8987 SELF CARE CURRENT STATUS: HCPCS

## 2018-12-15 PROCEDURE — 84100 ASSAY OF PHOSPHORUS: CPT

## 2018-12-15 PROCEDURE — 83735 ASSAY OF MAGNESIUM: CPT

## 2018-12-15 PROCEDURE — G8979 MOBILITY GOAL STATUS: HCPCS

## 2018-12-15 RX ORDER — CHLORDIAZEPOXIDE HYDROCHLORIDE 25 MG/1
25 CAPSULE, GELATIN COATED ORAL 2 TIMES DAILY
Qty: 14 CAPSULE | Refills: 0 | Status: SHIPPED | OUTPATIENT
Start: 2018-12-15 | End: 2018-12-22

## 2018-12-15 RX ADMIN — Medication 10 ML: at 06:39

## 2018-12-15 RX ADMIN — HYDRALAZINE HYDROCHLORIDE 10 MG: 20 INJECTION INTRAMUSCULAR; INTRAVENOUS at 06:39

## 2018-12-15 RX ADMIN — Medication 200 MG: at 08:27

## 2018-12-15 RX ADMIN — Medication 10 ML: at 08:31

## 2018-12-15 RX ADMIN — METOPROLOL TARTRATE 25 MG: 25 TABLET, FILM COATED ORAL at 08:27

## 2018-12-15 RX ADMIN — CHLORDIAZEPOXIDE HYDROCHLORIDE 25 MG: 25 CAPSULE ORAL at 08:27

## 2018-12-15 ASSESSMENT — PAIN SCALES - GENERAL: PAINLEVEL_OUTOF10: 0

## 2018-12-15 ASSESSMENT — PAIN SCALES - WONG BAKER
WONGBAKER_NUMERICALRESPONSE: 0

## 2018-12-15 NOTE — DISCHARGE SUMMARY
Hospital Medicine Discharge Summary    Patient: Vickie Fermin     Gender: male  : 1975   Age: 39 y.o. 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 o. male current every day etoh use, and nicotine use presents after a fall , LOC and seizure like episode. Per ER note The pt had an unwitnessed episode of seizure. The pt was unable to recall events surrounding his fall at work. 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 He has a hematoma of the right sided forehead extending to right eye. Admits to drinking 1/2 bottle of vodka almost daily. Has been drinking since age 14 Blood etoh level is undetacble on admission. CIWA scores constantly high, due to imminent DT, pt transferred to ICU on 12/10/18. Still need to be on precedex, having hallucinations. , Alcantara catheter placed on , Neurology service consulted, No need for antiepileptic medication, strongly urged him to quit drinking alcohol, recommended alcohol rehabilitation program.Patient states that he will think about it. Patient look better on , Alcantara catheter  Discontinued, precedex titrated down, and PO Librium started. PT/OT services consulted, scored a 24/24 on the AM-PAC short mobility form.   At this time, no further PT is recommended upon discharge due to pt being independent

## 2018-12-15 NOTE — PROGRESS NOTES
Patient ambulated in room with contact guard, a little unsteady at first.  Patient ambulated 3 laps around the ICU with contact guard tolerated well. Patient sitting up in chair with chair alarm on for safety. Call light within reach.   Rolle Roxane, RN

## 2018-12-15 NOTE — PROGRESS NOTES
Occupational Therapy   Occupational Therapy Initial Assessment/Discharge Summary  Date: 12/15/2018   Patient Name: Herbert Knight  MRN: [id number]     : 1975    Date of Service: 12/15/2018    Discharge Recommendations: Herbert Knight scored a 24/24 on the -[facility] ADL Inpatient form. At this time, no further OT is recommended upon discharge due to performing near baseline function. Recommend patient returns to prior setting with prior services. OT Equipment Recommendations  Equipment Needed: No      Patient Diagnosis(es): The primary encounter diagnosis was Seizure ([facility] [address] Diagnoses of Alcohol withdrawal syndrome with complication ([facility] [address] Injury of head, initial encounter, and Cerebrovascular accident (CVA), unspecified mechanism ([facility] [address] were also pertinent to this visit. has a past medical history of Alcohol withdrawal ([facility] [address] and Seizure ([facility] [address] has a past surgical history that includes Wrist surgery (Right). Treatment Diagnosis: Anticipated deficits associated with seizure-like activity      Restrictions  Restrictions/Precautions  Restrictions/Precautions: Fall Risk (high)  Position Activity Restriction  Other position/activity restrictions: Patient had unwitnessed seizure while working at [employer] patient has hx of seizures and last seizure one to two months ago. patient has hematoma on head. patient brought in by [facility] and they states patient's post ictal state has improved.      Subjective   General  Chart Reviewed: Yes  Diagnosis: Seizure-like activity  Subjective  Subjective: Pt sitting upright in arm chair at time of therapist arrival. Pleasant and agreeable to OT/PT eval  Pain Assessment  Patient Currently in Pain: Denies       Social/Functional History  Social/Functional History  Lives With:  (lives with mother)  Type of Home: House  Home Layout: Two level, Laundry in basement  Home Access: Stairs to enter without rails  Entrance Stairs - Number of Steps: 1 DANIELA  Bathroom Shower/Tub: Tub/Shower unit, Walk-in shower  Home Equipment: Crutches  ADL Assistance: Independent  Homemaking Assistance: Independent  Ambulation Assistance: Independent  Active : Yes  Mode of Transportation: Car  Type of occupation: Working at "[employer]: outdoor activities, motorcycles, fishing, son is 21, daughter is 21, youngest child is 15  Additional Comments: Falls in last 6 months: 0        Objective   Vision: Within Functional Limits  Hearing: Within functional limits    Orientation  Overall Orientation Status: Within Normal Limits     Balance  Sitting Balance: Independent  Standing Balance: Independent  Standing Balance  Time: ~3-4 minutes  Activity: fxl mob  Sit to stand: Independent  Stand to sit: Independent  Functional Mobility  Functional - Mobility Device: No device  Activity: Other  Assist Level: Independent  Functional Mobility Comments: ~270'x2 no device around unit  ADL  LE Dressing: Independent (donning jeans)  Additional Comments: Pt ambulated from arm chair > counter to retrieve clothing with min cues for safety, staying seated to complete dressing. Tone RUE  RUE Tone: Normotonic  Tone LUE  LUE Tone: Normotonic  Coordination  Movements Are Fluid And Coordinated: Yes        Transfers  Sit to stand: Independent  Stand to sit:  Independent     Cognition  Overall Cognitive Status: WFL  Cognition Comment: Slight word finding difficulty        Sensation  Overall Sensation Status: WNL        LUE AROM (degrees)  LUE AROM : WFL  Left Hand AROM (degrees)  Left Hand AROM: WFL  RUE AROM (degrees)  RUE AROM : WFL  Right Hand AROM (degrees)  Right Hand AROM: WFL  LUE Strength  Gross LUE Strength: WFL  RUE Strength  Gross RUE Strength: WFL        Assessment   Assessment: Pt near baseline function; no OT indicated at this time  Treatment Diagnosis: Anticipated deficits associated with seizure-like activity  Prognosis: Good  Decision Making: Low Complexity  Exam: as above  Patient

## 2018-12-15 NOTE — PROGRESS NOTES
Pt discharged to home with a friend. Discharge instructions explained. Questions answered. Transported via wheelchair to personal vehicle with all personal belongings.  Carmen Torres RN

## 2018-12-15 NOTE — PLAN OF CARE
Problem: Falls - Risk of:  Goal: Will remain free from falls  Will remain free from falls   Outcome: Ongoing  Patient placed on fall Precautions per [facility] Fall Risk Assessment Scale (Score> 45). Fall risk armband on, yellow blanket placed on foot of bed, S.A.F.E. sign or orange light displayed at door. Bed in locked and low position, bed alarm armed and audible, call light and bedside table in reach. Patient acknowledges the need to call before getting out of bed. Q2 monitoring, and toileting performed. Problem: Pain:  Goal: Control of acute pain  Control of acute pain   Outcome: Ongoing  Patient denies any pain at this time. Problem: Sleep Pattern Disturbance:  Goal: Appears well-rested  Appears well-rested   Outcome: Ongoing  Patient states he feels he needs something to help him sleep. Has not sleep in a couple days.

## 2019-07-31 ENCOUNTER — APPOINTMENT (OUTPATIENT)
Dept: CT IMAGING | Age: 44
DRG: 101 | End: 2019-07-31

## 2019-07-31 ENCOUNTER — APPOINTMENT (OUTPATIENT)
Dept: GENERAL RADIOLOGY | Age: 44
DRG: 101 | End: 2019-07-31

## 2019-07-31 ENCOUNTER — HOSPITAL ENCOUNTER (INPATIENT)
Age: 44
LOS: 1 days | Discharge: LEFT BEFORE DISCHARGE | DRG: 101 | End: 2019-08-01
Attending: EMERGENCY MEDICINE | Admitting: INTERNAL MEDICINE

## 2019-07-31 DIAGNOSIS — R56.9 SEIZURE (HCC): ICD-10-CM

## 2019-07-31 DIAGNOSIS — G93.40 ACUTE ENCEPHALOPATHY: Primary | ICD-10-CM

## 2019-07-31 DIAGNOSIS — Z87.898 HISTORY OF ALCOHOL USE DISORDER: ICD-10-CM

## 2019-07-31 LAB
A/G RATIO: 1.1 (ref 1.1–2.2)
ACETAMINOPHEN LEVEL: <5 UG/ML (ref 10–30)
ALBUMIN SERPL-MCNC: 4.8 G/DL (ref 3.4–5)
ALP BLD-CCNC: 77 U/L (ref 40–129)
ALT SERPL-CCNC: 85 U/L (ref 10–40)
AMMONIA: 99 UMOL/L (ref 16–60)
AMPHETAMINE SCREEN, URINE: ABNORMAL
ANION GAP SERPL CALCULATED.3IONS-SCNC: 32 MMOL/L (ref 3–16)
APTT: 30.2 SEC (ref 26–36)
AST SERPL-CCNC: 119 U/L (ref 15–37)
BARBITURATE SCREEN URINE: ABNORMAL
BASOPHILS ABSOLUTE: 0.1 K/UL (ref 0–0.2)
BASOPHILS RELATIVE PERCENT: 0.8 %
BENZODIAZEPINE SCREEN, URINE: ABNORMAL
BILIRUB SERPL-MCNC: 1.9 MG/DL (ref 0–1)
BILIRUBIN URINE: NEGATIVE
BLOOD, URINE: ABNORMAL
BUN BLDV-MCNC: 14 MG/DL (ref 7–20)
CALCIUM SERPL-MCNC: 9.6 MG/DL (ref 8.3–10.6)
CANNABINOID SCREEN URINE: POSITIVE
CHLORIDE BLD-SCNC: 93 MMOL/L (ref 99–110)
CLARITY: CLEAR
CO2: 13 MMOL/L (ref 21–32)
COCAINE METABOLITE SCREEN URINE: ABNORMAL
COLOR: YELLOW
CREAT SERPL-MCNC: 0.8 MG/DL (ref 0.9–1.3)
EOSINOPHILS ABSOLUTE: 0 K/UL (ref 0–0.6)
EOSINOPHILS RELATIVE PERCENT: 0.1 %
EPITHELIAL CELLS, UA: 1 /HPF (ref 0–5)
ETHANOL: NORMAL MG/DL (ref 0–0.08)
GFR AFRICAN AMERICAN: >60
GFR NON-AFRICAN AMERICAN: >60
GLOBULIN: 4.2 G/DL
GLUCOSE BLD-MCNC: 150 MG/DL (ref 70–99)
GLUCOSE URINE: NEGATIVE MG/DL
HCT VFR BLD CALC: 38.9 % (ref 40.5–52.5)
HEMOGLOBIN: 12.9 G/DL (ref 13.5–17.5)
HYALINE CASTS: 2 /LPF (ref 0–8)
INR BLD: 0.99 (ref 0.86–1.14)
KETONES, URINE: 40 MG/DL
LACTIC ACID: 0.8 MMOL/L (ref 0.4–2)
LACTIC ACID: 10.7 MMOL/L (ref 0.4–2)
LEUKOCYTE ESTERASE, URINE: NEGATIVE
LIPASE: 40 U/L (ref 13–60)
LYMPHOCYTES ABSOLUTE: 1.1 K/UL (ref 1–5.1)
LYMPHOCYTES RELATIVE PERCENT: 17.9 %
Lab: ABNORMAL
MAGNESIUM: 1.5 MG/DL (ref 1.8–2.4)
MCH RBC QN AUTO: 34.2 PG (ref 26–34)
MCHC RBC AUTO-ENTMCNC: 33.1 G/DL (ref 31–36)
MCV RBC AUTO: 103.3 FL (ref 80–100)
METHADONE SCREEN, URINE: ABNORMAL
MICROSCOPIC EXAMINATION: YES
MONOCYTES ABSOLUTE: 0.5 K/UL (ref 0–1.3)
MONOCYTES RELATIVE PERCENT: 8.4 %
NEUTROPHILS ABSOLUTE: 4.7 K/UL (ref 1.7–7.7)
NEUTROPHILS RELATIVE PERCENT: 72.8 %
NITRITE, URINE: NEGATIVE
OPIATE SCREEN URINE: ABNORMAL
OXYCODONE URINE: ABNORMAL
PDW BLD-RTO: 13.7 % (ref 12.4–15.4)
PH UA: 6
PH UA: 6 (ref 5–8)
PHENCYCLIDINE SCREEN URINE: ABNORMAL
PLATELET # BLD: 175 K/UL (ref 135–450)
PMV BLD AUTO: 8 FL (ref 5–10.5)
POTASSIUM REFLEX MAGNESIUM: 3.5 MMOL/L (ref 3.5–5.1)
PRO-BNP: 10 PG/ML (ref 0–124)
PROPOXYPHENE SCREEN: ABNORMAL
PROTEIN UA: >=300 MG/DL
PROTHROMBIN TIME: 11.3 SEC (ref 9.8–13)
RBC # BLD: 3.77 M/UL (ref 4.2–5.9)
RBC UA: 2 /HPF (ref 0–4)
SALICYLATE, SERUM: <0.3 MG/DL (ref 15–30)
SODIUM BLD-SCNC: 138 MMOL/L (ref 136–145)
SPECIFIC GRAVITY UA: 1.02 (ref 1–1.03)
TOTAL PROTEIN: 9 G/DL (ref 6.4–8.2)
TROPONIN: <0.01 NG/ML
URINE REFLEX TO CULTURE: ABNORMAL
URINE TYPE: ABNORMAL
UROBILINOGEN, URINE: 0.2 E.U./DL
WBC # BLD: 6.4 K/UL (ref 4–11)
WBC UA: 1 /HPF (ref 0–5)

## 2019-07-31 PROCEDURE — 84484 ASSAY OF TROPONIN QUANT: CPT

## 2019-07-31 PROCEDURE — 2580000003 HC RX 258: Performed by: INTERNAL MEDICINE

## 2019-07-31 PROCEDURE — 6360000002 HC RX W HCPCS: Performed by: PHYSICIAN ASSISTANT

## 2019-07-31 PROCEDURE — 72125 CT NECK SPINE W/O DYE: CPT

## 2019-07-31 PROCEDURE — 81001 URINALYSIS AUTO W/SCOPE: CPT

## 2019-07-31 PROCEDURE — 36415 COLL VENOUS BLD VENIPUNCTURE: CPT

## 2019-07-31 PROCEDURE — 85025 COMPLETE CBC W/AUTO DIFF WBC: CPT

## 2019-07-31 PROCEDURE — 85610 PROTHROMBIN TIME: CPT

## 2019-07-31 PROCEDURE — 96375 TX/PRO/DX INJ NEW DRUG ADDON: CPT

## 2019-07-31 PROCEDURE — 70450 CT HEAD/BRAIN W/O DYE: CPT

## 2019-07-31 PROCEDURE — 71045 X-RAY EXAM CHEST 1 VIEW: CPT

## 2019-07-31 PROCEDURE — 87040 BLOOD CULTURE FOR BACTERIA: CPT

## 2019-07-31 PROCEDURE — 96365 THER/PROPH/DIAG IV INF INIT: CPT

## 2019-07-31 PROCEDURE — G0480 DRUG TEST DEF 1-7 CLASSES: HCPCS

## 2019-07-31 PROCEDURE — 2060000000 HC ICU INTERMEDIATE R&B

## 2019-07-31 PROCEDURE — 83690 ASSAY OF LIPASE: CPT

## 2019-07-31 PROCEDURE — 96361 HYDRATE IV INFUSION ADD-ON: CPT

## 2019-07-31 PROCEDURE — 93005 ELECTROCARDIOGRAM TRACING: CPT | Performed by: PHYSICIAN ASSISTANT

## 2019-07-31 PROCEDURE — 2580000003 HC RX 258: Performed by: PHYSICIAN ASSISTANT

## 2019-07-31 PROCEDURE — 2500000003 HC RX 250 WO HCPCS: Performed by: EMERGENCY MEDICINE

## 2019-07-31 PROCEDURE — 99285 EMERGENCY DEPT VISIT HI MDM: CPT

## 2019-07-31 PROCEDURE — 80307 DRUG TEST PRSMV CHEM ANLYZR: CPT

## 2019-07-31 PROCEDURE — 83880 ASSAY OF NATRIURETIC PEPTIDE: CPT

## 2019-07-31 PROCEDURE — 83735 ASSAY OF MAGNESIUM: CPT

## 2019-07-31 PROCEDURE — 85730 THROMBOPLASTIN TIME PARTIAL: CPT

## 2019-07-31 PROCEDURE — 2580000003 HC RX 258: Performed by: EMERGENCY MEDICINE

## 2019-07-31 PROCEDURE — 80053 COMPREHEN METABOLIC PANEL: CPT

## 2019-07-31 PROCEDURE — 82140 ASSAY OF AMMONIA: CPT

## 2019-07-31 PROCEDURE — 6360000002 HC RX W HCPCS: Performed by: EMERGENCY MEDICINE

## 2019-07-31 PROCEDURE — 83605 ASSAY OF LACTIC ACID: CPT

## 2019-07-31 PROCEDURE — 96366 THER/PROPH/DIAG IV INF ADDON: CPT

## 2019-07-31 PROCEDURE — 6360000002 HC RX W HCPCS: Performed by: INTERNAL MEDICINE

## 2019-07-31 RX ORDER — ACETAMINOPHEN 325 MG/1
650 TABLET ORAL EVERY 4 HOURS PRN
Status: DISCONTINUED | OUTPATIENT
Start: 2019-07-31 | End: 2019-08-01 | Stop reason: HOSPADM

## 2019-07-31 RX ORDER — SODIUM CHLORIDE 9 MG/ML
INJECTION, SOLUTION INTRAVENOUS CONTINUOUS
Status: DISCONTINUED | OUTPATIENT
Start: 2019-07-31 | End: 2019-08-01 | Stop reason: HOSPADM

## 2019-07-31 RX ORDER — LORAZEPAM 2 MG/ML
1 INJECTION INTRAMUSCULAR EVERY 4 HOURS PRN
Status: DISCONTINUED | OUTPATIENT
Start: 2019-07-31 | End: 2019-08-01 | Stop reason: HOSPADM

## 2019-07-31 RX ORDER — MAGNESIUM SULFATE IN WATER 40 MG/ML
2 INJECTION, SOLUTION INTRAVENOUS ONCE
Status: COMPLETED | OUTPATIENT
Start: 2019-07-31 | End: 2019-07-31

## 2019-07-31 RX ORDER — LEVETIRACETAM 5 MG/ML
500 INJECTION INTRAVASCULAR EVERY 12 HOURS
Status: DISCONTINUED | OUTPATIENT
Start: 2019-07-31 | End: 2019-08-01 | Stop reason: HOSPADM

## 2019-07-31 RX ORDER — ONDANSETRON 2 MG/ML
4 INJECTION INTRAMUSCULAR; INTRAVENOUS ONCE
Status: COMPLETED | OUTPATIENT
Start: 2019-07-31 | End: 2019-07-31

## 2019-07-31 RX ORDER — 0.9 % SODIUM CHLORIDE 0.9 %
1000 INTRAVENOUS SOLUTION INTRAVENOUS ONCE
Status: COMPLETED | OUTPATIENT
Start: 2019-07-31 | End: 2019-07-31

## 2019-07-31 RX ORDER — SODIUM CHLORIDE 0.9 % (FLUSH) 0.9 %
10 SYRINGE (ML) INJECTION EVERY 12 HOURS SCHEDULED
Status: DISCONTINUED | OUTPATIENT
Start: 2019-07-31 | End: 2019-08-01 | Stop reason: HOSPADM

## 2019-07-31 RX ORDER — SODIUM CHLORIDE 0.9 % (FLUSH) 0.9 %
10 SYRINGE (ML) INJECTION PRN
Status: DISCONTINUED | OUTPATIENT
Start: 2019-07-31 | End: 2019-08-01 | Stop reason: HOSPADM

## 2019-07-31 RX ORDER — M-VIT,TX,IRON,MINS/CALC/FOLIC 27MG-0.4MG
1 TABLET ORAL DAILY
Status: DISCONTINUED | OUTPATIENT
Start: 2019-08-01 | End: 2019-08-01 | Stop reason: HOSPADM

## 2019-07-31 RX ORDER — ONDANSETRON 2 MG/ML
4 INJECTION INTRAMUSCULAR; INTRAVENOUS EVERY 6 HOURS PRN
Status: DISCONTINUED | OUTPATIENT
Start: 2019-07-31 | End: 2019-08-01 | Stop reason: HOSPADM

## 2019-07-31 RX ORDER — LORAZEPAM 2 MG/ML
1 INJECTION INTRAMUSCULAR ONCE
Status: COMPLETED | OUTPATIENT
Start: 2019-07-31 | End: 2019-07-31

## 2019-07-31 RX ADMIN — MAGNESIUM SULFATE HEPTAHYDRATE 2 G: 40 INJECTION, SOLUTION INTRAVENOUS at 15:36

## 2019-07-31 RX ADMIN — LEVETIRACETAM 500 MG: 5 INJECTION INTRAVENOUS at 22:11

## 2019-07-31 RX ADMIN — ONDANSETRON 4 MG: 2 INJECTION INTRAMUSCULAR; INTRAVENOUS at 14:38

## 2019-07-31 RX ADMIN — SODIUM CHLORIDE: 9 INJECTION, SOLUTION INTRAVENOUS at 22:18

## 2019-07-31 RX ADMIN — ONDANSETRON 4 MG: 2 INJECTION INTRAMUSCULAR; INTRAVENOUS at 22:10

## 2019-07-31 RX ADMIN — Medication 10 ML: at 22:11

## 2019-07-31 RX ADMIN — LORAZEPAM 1 MG: 2 INJECTION INTRAMUSCULAR; INTRAVENOUS at 14:37

## 2019-07-31 RX ADMIN — SODIUM CHLORIDE 1000 ML: 9 INJECTION, SOLUTION INTRAVENOUS at 14:37

## 2019-07-31 RX ADMIN — SODIUM CHLORIDE 1000 ML: 9 INJECTION, SOLUTION INTRAVENOUS at 15:36

## 2019-07-31 RX ADMIN — THIAMINE HYDROCHLORIDE: 100 INJECTION, SOLUTION INTRAMUSCULAR; INTRAVENOUS at 18:29

## 2019-07-31 ASSESSMENT — PAIN SCALES - GENERAL: PAINLEVEL_OUTOF10: 0

## 2019-07-31 NOTE — ED PROVIDER NOTES
heard.  Pulmonary/Chest: Effort normal and breath sounds normal. No accessory muscle usage. No respiratory distress. He has no wheezes. He has no rhonchi. He has no rales. Abdominal: Soft. Normal appearance. He exhibits no distension and no mass. There is no tenderness. There is no rigidity, no rebound, no guarding and no CVA tenderness. Neurological: He is alert and oriented to person, place, and time. He has normal strength. He displays tremor. No cranial nerve deficit or sensory deficit. Coordination normal. GCS eye subscore is 4. GCS verbal subscore is 5. GCS motor subscore is 6. Initially upon presentation appeared postictal.  He had mild sluggish verbal responses. He was alert to person time and place. Patient was demonstrating evidence of tremors. Concerns for acute alcohol withdrawal by history as well as presentation noted. Skin: Skin is warm. He is diaphoretic. There is pallor. Psychiatric: He has a normal mood and affect. His behavior is normal.   Nursing note and vitals reviewed.       DIAGNOSTIC RESULTS   LABS:    Labs Reviewed   CBC WITH AUTO DIFFERENTIAL - Abnormal; Notable for the following components:       Result Value    RBC 3.77 (*)     Hemoglobin 12.9 (*)     Hematocrit 38.9 (*)     .3 (*)     MCH 34.2 (*)     All other components within normal limits    Narrative:     Performed at:  [facility] [address], [place], [address]   Phone [phone]   COMPREHENSIVE METABOLIC PANEL W/ REFLEX TO MG FOR LOW K - Abnormal; Notable for the following components:    Chloride 93 (*)     CO2 13 (*)     Anion Gap 32 (*)     Glucose 150 (*)     CREATININE 0.8 (*)     Total Protein 9.0 (*)     Total Bilirubin 1.9 (*)     ALT 85 (*)      (*)     All other components within normal limits    Narrative:     CALL  [facility] tel. [phone],  Chemistry results called to and read back by Beatriz Leonardo, 07/31/2019  15:07, by [facility]  Performed condition        DISCONTINUED MEDICATIONS:  Discontinued Medications    MAGNESIUM OXIDE (MAGNESIUM-OXIDE) 400 (241.3 MG) MG TABS TABLET    Take 1 tablet by mouth daily    MULTIPLE VITAMINS-MINERALS (THERAPEUTIC MULTIVITAMIN-MINERALS) TABLET    Take 1 tablet by mouth daily    VITAMIN D (CHOLECALCIFEROL) 1000 UNIT TABS TABLET    Take 1,000 Units by mouth daily            (Please note that portions ofthis note were completed with a voice recognition program.  Efforts were made to edit the dictations but occasionally words are mis-transcribed.)    Lindsey Johnston PA-C (electronically signed)           Gisselle May PA-C  07/31/19 7642

## 2019-07-31 NOTE — H&P
REVIEW OF SYSTEMS:   Pertinent positives as noted in the HPI. All other systems reviewed and negative. PHYSICAL EXAM PERFORMED:    BP (!) 153/101   Pulse 97   Temp 98.9 °F (37.2 °C) (Oral)   Resp 20   Wt 160 lb (72.6 kg)   SpO2 96%   BMI 23.63 kg/m²     General appearance:  No apparent distress, appears stated age and cooperative. Little shaky  HEENT:  Normal cephalic, atraumatic without obvious deformity. Pupils equal, round, and reactive to light. Extra ocular muscles intact. Conjunctivae/corneas clear. Neck: Supple, with full range of motion. No jugular venous distention. Trachea midline. Respiratory:  Normal respiratory effort. Clear to auscultation, bilaterally without Rales/Wheezes/Rhonchi. Cardiovascular:  Regular rate and rhythm with normal S1/S2 without murmurs, rubs or gallops. Abdomen: Soft, non-tender, non-distended with normal bowel sounds. Musculoskeletal:  No clubbing, cyanosis or edema bilaterally. Full range of motion without deformity. Skin: Skin color, texture, turgor normal.  No rashes or lesions. Neurologic:  Neurovascularly intact without any focal sensory/motor deficits.  Cranial nerves: II-XII intact, grossly non-focal.  Psychiatric:  Alert and oriented, thought content appropriate, normal insight  Capillary Refill: Brisk,< 3 seconds   Peripheral Pulses: +2 palpable, equal bilaterally       Labs:     Recent Labs     07/31/19  1431   WBC 6.4   HGB 12.9*   HCT 38.9*        Recent Labs     07/31/19  1431      K 3.5   CL 93*   CO2 13*   BUN 14   CREATININE 0.8*   CALCIUM 9.6     Recent Labs     07/31/19  1431   *   ALT 85*   BILITOT 1.9*   ALKPHOS 77     Recent Labs     07/31/19  1431   INR 0.99     Recent Labs     07/31/19  1431   TROPONINI <0.01       Urinalysis:      Lab Results   Component Value Date    NITRU Negative 07/31/2019    WBCUA 1 07/31/2019    RBCUA 2 07/31/2019    BLOODU MODERATE 07/31/2019    SPECGRAV 1.019 07/31/2019    GLUCOSEU

## 2019-07-31 NOTE — ED NOTES
Pt's mother Jared Marie called for update, pt gave verbal permission to update mother on plan of care, spoke to pt;s mother and updated on plan of care.       Thalia Almaraz RN  07/31/19 1949

## 2019-07-31 NOTE — ED PROVIDER NOTES
I independently performed a history and physical on Adin Celestin. All diagnostic, treatment, and disposition decisions were made by myself in conjunction with the advanced practice provider. Briefly, this is a 38 y.o. male here for confusion, diaphoresis and probable seizure-like activity. Patient does report drinking alcohol. Otherwise he is quite difficult to interview and does not give much HPI or review of systems therefore it is limited. Events happened earlier today. He relates being mildly anxious. Denies headache or fevers. See JESSEE note      On exam:  Constitutional:  Well developed, well nourished, non-toxic appearance   Eyes:  PERRL, conjunctiva normal   HENT:  Atraumatic, external ears normal, nosenormal, oropharynx moist, no pharyngeal exudates. Neck- normal range of motion, supple   Respiratory:  No respiratory distress, normal breath sounds, no rales, no wheezing   Cardiovascular:  Normal rate, normal rhythm, no murmurs,   GI:  Soft, nondistended, nontender, no obvious organomegaly, no mass, no rebound, no guarding   Musculoskeletal:  No tenderness, no deformities. Integument:  no rashes on exposed surfaces. Arrives quite diaphoretic. Neurologic:  Alert & oriented x 2,  normalmotor function, normal sensory function, no focal deficits noted. Mild tremors throughout  Psychiatric:  Speech and behavior appropriate. No agitation. EKG  Diagnosis              07/31/19 14:38:45 07/31/19 15:28:31 103 103 176 86 374 489 31 44 34 Sinus tachycardiaPossible Left atrial enlargementBorderline ECG           Screenings            MDM    Patient may have alcohol withdrawal seizure. Ativan and IVFs, and banana bag given. Recommended hospitalization. SEE JESSEE NOTE    Critical Care  There was a high probability of life-threatening clinical deterioration in the patient's condition requiring my urgent intervention.   Total critical care time with the patient was 35 minutes excluding separately

## 2019-07-31 NOTE — ED NOTES
Updated pt's mother Margaret Segura on pt condition and plan of care. Pt in bed with eyes closed, no distress noted.       Connie Naranjo RN  07/31/19 1018

## 2019-08-01 VITALS
OXYGEN SATURATION: 98 % | SYSTOLIC BLOOD PRESSURE: 144 MMHG | HEIGHT: 69 IN | BODY MASS INDEX: 22.38 KG/M2 | WEIGHT: 151.1 LBS | TEMPERATURE: 98.1 F | RESPIRATION RATE: 18 BRPM | DIASTOLIC BLOOD PRESSURE: 83 MMHG | HEART RATE: 82 BPM

## 2019-08-01 LAB
A/G RATIO: 1.1 (ref 1.1–2.2)
ALBUMIN SERPL-MCNC: 4.2 G/DL (ref 3.4–5)
ALP BLD-CCNC: 67 U/L (ref 40–129)
ALT SERPL-CCNC: 72 U/L (ref 10–40)
AMMONIA: 42 UMOL/L (ref 16–60)
ANION GAP SERPL CALCULATED.3IONS-SCNC: 23 MMOL/L (ref 3–16)
AST SERPL-CCNC: 111 U/L (ref 15–37)
BILIRUB SERPL-MCNC: 2.3 MG/DL (ref 0–1)
BUN BLDV-MCNC: 8 MG/DL (ref 7–20)
CALCIUM SERPL-MCNC: 8.2 MG/DL (ref 8.3–10.6)
CHLORIDE BLD-SCNC: 95 MMOL/L (ref 99–110)
CO2: 19 MMOL/L (ref 21–32)
CREAT SERPL-MCNC: 0.6 MG/DL (ref 0.9–1.3)
EKG ATRIAL RATE: 103 BPM
EKG DIAGNOSIS: NORMAL
EKG P AXIS: 31 DEGREES
EKG P-R INTERVAL: 176 MS
EKG Q-T INTERVAL: 374 MS
EKG QRS DURATION: 86 MS
EKG QTC CALCULATION (BAZETT): 489 MS
EKG R AXIS: 44 DEGREES
EKG T AXIS: 34 DEGREES
EKG VENTRICULAR RATE: 103 BPM
GFR AFRICAN AMERICAN: >60
GFR NON-AFRICAN AMERICAN: >60
GLOBULIN: 3.7 G/DL
GLUCOSE BLD-MCNC: 66 MG/DL (ref 70–99)
HCT VFR BLD CALC: 35.9 % (ref 40.5–52.5)
HEMOGLOBIN: 12.2 G/DL (ref 13.5–17.5)
MAGNESIUM: 1.6 MG/DL (ref 1.8–2.4)
MCH RBC QN AUTO: 34.8 PG (ref 26–34)
MCHC RBC AUTO-ENTMCNC: 34.1 G/DL (ref 31–36)
MCV RBC AUTO: 102 FL (ref 80–100)
PDW BLD-RTO: 13.4 % (ref 12.4–15.4)
PLATELET # BLD: 145 K/UL (ref 135–450)
PMV BLD AUTO: 7.4 FL (ref 5–10.5)
POTASSIUM REFLEX MAGNESIUM: 3.4 MMOL/L (ref 3.5–5.1)
RBC # BLD: 3.52 M/UL (ref 4.2–5.9)
SODIUM BLD-SCNC: 137 MMOL/L (ref 136–145)
TOTAL PROTEIN: 7.9 G/DL (ref 6.4–8.2)
WBC # BLD: 5.7 K/UL (ref 4–11)

## 2019-08-01 PROCEDURE — 94760 N-INVAS EAR/PLS OXIMETRY 1: CPT

## 2019-08-01 PROCEDURE — 2580000003 HC RX 258: Performed by: INTERNAL MEDICINE

## 2019-08-01 PROCEDURE — 80053 COMPREHEN METABOLIC PANEL: CPT

## 2019-08-01 PROCEDURE — 83735 ASSAY OF MAGNESIUM: CPT

## 2019-08-01 PROCEDURE — 93010 ELECTROCARDIOGRAM REPORT: CPT | Performed by: INTERNAL MEDICINE

## 2019-08-01 PROCEDURE — 95819 EEG AWAKE AND ASLEEP: CPT

## 2019-08-01 PROCEDURE — 85027 COMPLETE CBC AUTOMATED: CPT

## 2019-08-01 PROCEDURE — 6360000002 HC RX W HCPCS: Performed by: INTERNAL MEDICINE

## 2019-08-01 PROCEDURE — 36415 COLL VENOUS BLD VENIPUNCTURE: CPT

## 2019-08-01 PROCEDURE — 6370000000 HC RX 637 (ALT 250 FOR IP): Performed by: INTERNAL MEDICINE

## 2019-08-01 PROCEDURE — 82140 ASSAY OF AMMONIA: CPT

## 2019-08-01 PROCEDURE — 6370000000 HC RX 637 (ALT 250 FOR IP): Performed by: PHYSICIAN ASSISTANT

## 2019-08-01 PROCEDURE — 99255 IP/OBS CONSLTJ NEW/EST HI 80: CPT | Performed by: PSYCHIATRY & NEUROLOGY

## 2019-08-01 RX ORDER — LORAZEPAM 1 MG/1
2 TABLET ORAL
Status: DISCONTINUED | OUTPATIENT
Start: 2019-08-01 | End: 2019-08-01 | Stop reason: HOSPADM

## 2019-08-01 RX ORDER — LORAZEPAM 2 MG/ML
4 INJECTION INTRAMUSCULAR
Status: DISCONTINUED | OUTPATIENT
Start: 2019-08-01 | End: 2019-08-01 | Stop reason: HOSPADM

## 2019-08-01 RX ORDER — LORAZEPAM 2 MG/ML
3 INJECTION INTRAMUSCULAR
Status: DISCONTINUED | OUTPATIENT
Start: 2019-08-01 | End: 2019-08-01 | Stop reason: HOSPADM

## 2019-08-01 RX ORDER — LORAZEPAM 2 MG/ML
1 INJECTION INTRAMUSCULAR
Status: DISCONTINUED | OUTPATIENT
Start: 2019-08-01 | End: 2019-08-01 | Stop reason: HOSPADM

## 2019-08-01 RX ORDER — LORAZEPAM 1 MG/1
4 TABLET ORAL
Status: DISCONTINUED | OUTPATIENT
Start: 2019-08-01 | End: 2019-08-01 | Stop reason: HOSPADM

## 2019-08-01 RX ORDER — LORAZEPAM 1 MG/1
1 TABLET ORAL
Status: DISCONTINUED | OUTPATIENT
Start: 2019-08-01 | End: 2019-08-01 | Stop reason: HOSPADM

## 2019-08-01 RX ORDER — LORAZEPAM 1 MG/1
3 TABLET ORAL
Status: DISCONTINUED | OUTPATIENT
Start: 2019-08-01 | End: 2019-08-01 | Stop reason: HOSPADM

## 2019-08-01 RX ORDER — LORAZEPAM 2 MG/ML
2 INJECTION INTRAMUSCULAR
Status: DISCONTINUED | OUTPATIENT
Start: 2019-08-01 | End: 2019-08-01 | Stop reason: HOSPADM

## 2019-08-01 RX ORDER — NICOTINE 21 MG/24HR
1 PATCH, TRANSDERMAL 24 HOURS TRANSDERMAL DAILY
Status: DISCONTINUED | OUTPATIENT
Start: 2019-08-01 | End: 2019-08-01 | Stop reason: HOSPADM

## 2019-08-01 RX ADMIN — Medication 10 ML: at 09:50

## 2019-08-01 RX ADMIN — LEVETIRACETAM 500 MG: 5 INJECTION INTRAVENOUS at 09:49

## 2019-08-01 RX ADMIN — LORAZEPAM 1 MG: 1 TABLET ORAL at 08:15

## 2019-08-01 ASSESSMENT — PAIN SCALES - GENERAL
PAINLEVEL_OUTOF10: 0

## 2019-08-01 NOTE — PLAN OF CARE
Patient alarm is in place and active. MD messaged about nicotine patch and clarifying abnormal lab values and beer order. Will continue to assess needs. He states he does not want ativan at this time.

## 2019-08-01 NOTE — PROGRESS NOTES
Jonna Presser from EEG called- pt to go down for EEG. Jonna Presser to place transport.  Fluids unhooked per request.
Seizures. EEG done MRI of the brain ordered neurology consult appreciated started on Keppra further plan per neurology    No signs symptoms of alcohol withdrawal patient claims not to be alcoholic.     Patient advised not to drive a car or operate heavy machinery for 6 months daily seizure-free    Neurology recommendation    Diet: DIET GENERAL;  Code:Full Code  DVT PPX Lovenox  Disposition  Home pending neurology evaluate      Nguyen Escalona MD   8/1/2019 12:45 PM

## 2019-08-02 RX ORDER — LEVETIRACETAM 500 MG/1
500 TABLET ORAL 2 TIMES DAILY
Qty: 60 TABLET | Refills: 0 | Status: SHIPPED | OUTPATIENT
Start: 2019-08-02 | End: 2020-09-11 | Stop reason: SDUPTHER

## 2019-08-05 LAB
BLOOD CULTURE, ROUTINE: NORMAL
CULTURE, BLOOD 2: NORMAL

## 2019-08-06 NOTE — DISCHARGE SUMMARY
[facility]    Patient Demographics    Patient. Aleta Navarro  Date of Birth. 1975  MRN. [id number]     Primary care provider. Martha Walls MD  (Tel: [phone])    Admit date: 7/31/2019    Discharge date (blank if same as Note Date): 8/1/2019  Note Date: 8/6/2019     Reason for Hospitalization. Chief Complaint   Patient presents with    Altered Mental Status     Pt sent home from work at [phone] was found in parking lot about 45 mins later passed out in truck, witnessed siezure like activity. Answers continue to change during assessment. per squad b/p in 200s         Significant Findings. Active Problems:    Seizure ([address]  Resolved Problems:    * No resolved hospital problems. *       Problems and results from this hospitalization that need follow up. 1. None     Significant test results and incidental findings. 1.   XR CHEST PORTABLE   Final Result   Unremarkable portable exam         CT Cervical Spine WO Contrast   Final Result   No acute abnormality of the cervical spine. Degenerative disease resulting   in bilateral foraminal narrowing at C5 through C7. 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 No evidence for acute intracranial pathology. CT Head WO Contrast   Final Result   No acute abnormality of the cervical spine. Degenerative disease resulting   in bilateral foraminal narrowing at C5 through C7. 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 No evidence for acute intracranial pathology. Invasive procedures and treatments. 1. None     Problem-based Hospital Course. Seizures.   EEG done MRI of the brain ordered neurology consult appreciated started on Keppra further plan per neurology     No signs symptoms of alcohol withdrawal patient claims not to be alcoholic.     Patient advised not to drive a car or operate

## 2020-07-07 ENCOUNTER — APPOINTMENT (OUTPATIENT)
Dept: GENERAL RADIOLOGY | Age: 45
End: 2020-07-07
Payer: MEDICARE

## 2020-07-07 ENCOUNTER — HOSPITAL ENCOUNTER (INPATIENT)
Age: 45
LOS: 9 days | Discharge: HOME HEALTH | End: 2020-07-17
Attending: EMERGENCY MEDICINE | Admitting: INTERNAL MEDICINE
Payer: MEDICARE

## 2020-07-07 PROCEDURE — 99285 EMERGENCY DEPT VISIT HI MDM: CPT

## 2020-07-07 RX ORDER — SODIUM CHLORIDE, SODIUM LACTATE, POTASSIUM CHLORIDE, CALCIUM CHLORIDE 600; 310; 30; 20 MG/100ML; MG/100ML; MG/100ML; MG/100ML
1000 INJECTION, SOLUTION INTRAVENOUS ONCE
Status: COMPLETED | OUTPATIENT
Start: 2020-07-07 | End: 2020-07-08

## 2020-07-07 NOTE — LETTER
[facility]  [address]  Phone: [phone]             July 22, 2020    Patient: Van Castillo   YOB: 1975   Date of Visit: 7/7/2020       To Whom It May Concern:    Jason Woodruff was seen and treated in our facility  beginning 7/7/2020 until 7/17/2020. He may return to work on 7/23/2020.       Sincerely,       Tika Cormier MD         Signature:__________________________________

## 2020-07-08 ENCOUNTER — APPOINTMENT (OUTPATIENT)
Dept: CT IMAGING | Age: 45
End: 2020-07-08
Payer: MEDICARE

## 2020-07-08 PROBLEM — F10.931 DELIRIUM TREMENS (HCC): Status: ACTIVE | Noted: 2020-07-08

## 2020-07-08 PROBLEM — F10.930 ALCOHOL WITHDRAWAL SEIZURE, UNCOMPLICATED (HCC): Status: ACTIVE | Noted: 2020-07-08

## 2020-07-08 PROBLEM — R56.9 ALCOHOL WITHDRAWAL SEIZURE, UNCOMPLICATED (HCC): Status: ACTIVE | Noted: 2020-07-08

## 2020-07-08 LAB
A/G RATIO: 1.3 (ref 1.1–2.2)
A/G RATIO: 1.3 (ref 1.1–2.2)
ACETAMINOPHEN LEVEL: <5 UG/ML (ref 10–30)
ALBUMIN SERPL-MCNC: 4.1 G/DL (ref 3.4–5)
ALBUMIN SERPL-MCNC: 5.1 G/DL (ref 3.4–5)
ALP BLD-CCNC: 41 U/L (ref 40–129)
ALP BLD-CCNC: 49 U/L (ref 40–129)
ALT SERPL-CCNC: 175 U/L (ref 10–40)
ALT SERPL-CCNC: 217 U/L (ref 10–40)
AMMONIA: 22 UMOL/L (ref 16–60)
AMPHETAMINE SCREEN, URINE: NORMAL
ANION GAP SERPL CALCULATED.3IONS-SCNC: 20 MMOL/L (ref 3–16)
ANION GAP SERPL CALCULATED.3IONS-SCNC: 24 MMOL/L (ref 3–16)
AST SERPL-CCNC: 189 U/L (ref 15–37)
AST SERPL-CCNC: 226 U/L (ref 15–37)
BARBITURATE SCREEN URINE: NORMAL
BASE EXCESS VENOUS: 2.8 MMOL/L (ref -3–3)
BASOPHILS ABSOLUTE: 0 K/UL (ref 0–0.2)
BASOPHILS RELATIVE PERCENT: 0.4 %
BENZODIAZEPINE SCREEN, URINE: NORMAL
BILIRUB SERPL-MCNC: 2.2 MG/DL (ref 0–1)
BILIRUB SERPL-MCNC: 2.2 MG/DL (ref 0–1)
BILIRUBIN URINE: ABNORMAL
BLOOD, URINE: ABNORMAL
BUN BLDV-MCNC: 13 MG/DL (ref 7–20)
BUN BLDV-MCNC: 17 MG/DL (ref 7–20)
CALCIUM SERPL-MCNC: 10.1 MG/DL (ref 8.3–10.6)
CALCIUM SERPL-MCNC: 9.3 MG/DL (ref 8.3–10.6)
CANNABINOID SCREEN URINE: NORMAL
CARBOXYHEMOGLOBIN: 3.7 % (ref 0–1.5)
CHLORIDE BLD-SCNC: 84 MMOL/L (ref 99–110)
CHLORIDE BLD-SCNC: 88 MMOL/L (ref 99–110)
CLARITY: CLEAR
CO2: 25 MMOL/L (ref 21–32)
CO2: 25 MMOL/L (ref 21–32)
COCAINE METABOLITE SCREEN URINE: NORMAL
COLOR: ABNORMAL
CREAT SERPL-MCNC: 0.7 MG/DL (ref 0.9–1.3)
CREAT SERPL-MCNC: 0.9 MG/DL (ref 0.9–1.3)
EKG ATRIAL RATE: 96 BPM
EKG DIAGNOSIS: NORMAL
EKG P AXIS: 42 DEGREES
EKG P-R INTERVAL: 158 MS
EKG Q-T INTERVAL: 384 MS
EKG QRS DURATION: 90 MS
EKG QTC CALCULATION (BAZETT): 485 MS
EKG R AXIS: 69 DEGREES
EKG T AXIS: 46 DEGREES
EKG VENTRICULAR RATE: 96 BPM
EOSINOPHILS ABSOLUTE: 0 K/UL (ref 0–0.6)
EOSINOPHILS RELATIVE PERCENT: 0 %
EPITHELIAL CELLS, UA: 1 /HPF (ref 0–5)
ETHANOL: NORMAL MG/DL (ref 0–0.08)
GFR AFRICAN AMERICAN: >60
GFR AFRICAN AMERICAN: >60
GFR NON-AFRICAN AMERICAN: >60
GFR NON-AFRICAN AMERICAN: >60
GLOBULIN: 3.2 G/DL
GLOBULIN: 3.8 G/DL
GLUCOSE BLD-MCNC: 71 MG/DL (ref 70–99)
GLUCOSE BLD-MCNC: 95 MG/DL (ref 70–99)
GLUCOSE URINE: NEGATIVE MG/DL
HCO3 VENOUS: 28 MMOL/L (ref 23–29)
HCT VFR BLD CALC: 37.9 % (ref 40.5–52.5)
HEMOGLOBIN, VEN, REDUCED: 30 %
HEMOGLOBIN: 13.3 G/DL (ref 13.5–17.5)
HYALINE CASTS: 18 /LPF (ref 0–8)
INR BLD: 0.98 (ref 0.86–1.14)
KEPPRA DOSE AMT: ABNORMAL
KEPPRA: <2 UG/ML (ref 6–46)
KETONES, URINE: >=80 MG/DL
LACTIC ACID, SEPSIS: 1.4 MMOL/L (ref 0.4–1.9)
LACTIC ACID, SEPSIS: 4.9 MMOL/L (ref 0.4–1.9)
LEUKOCYTE ESTERASE, URINE: NEGATIVE
LIPASE: 29 U/L (ref 13–60)
LYMPHOCYTES ABSOLUTE: 0.9 K/UL (ref 1–5.1)
LYMPHOCYTES RELATIVE PERCENT: 9.4 %
Lab: NORMAL
MAGNESIUM: 0.9 MG/DL (ref 1.8–2.4)
MAGNESIUM: 1.3 MG/DL (ref 1.8–2.4)
MCH RBC QN AUTO: 34.2 PG (ref 26–34)
MCHC RBC AUTO-ENTMCNC: 35.1 G/DL (ref 31–36)
MCV RBC AUTO: 97.4 FL (ref 80–100)
METHADONE SCREEN, URINE: NORMAL
METHEMOGLOBIN VENOUS: 0.5 %
MICROSCOPIC EXAMINATION: YES
MONOCYTES ABSOLUTE: 1.1 K/UL (ref 0–1.3)
MONOCYTES RELATIVE PERCENT: 11.7 %
NEUTROPHILS ABSOLUTE: 7.5 K/UL (ref 1.7–7.7)
NEUTROPHILS RELATIVE PERCENT: 78.5 %
NITRITE, URINE: NEGATIVE
O2 CONTENT, VEN: 12 VOL %
O2 SAT, VEN: 68 %
O2 THERAPY: ABNORMAL
OPIATE SCREEN URINE: NORMAL
OXYCODONE URINE: NORMAL
PCO2, VEN: 44.4 MMHG (ref 40–50)
PDW BLD-RTO: 13.9 % (ref 12.4–15.4)
PH UA: 6
PH UA: 6 (ref 5–8)
PH VENOUS: 7.41 (ref 7.35–7.45)
PHENCYCLIDINE SCREEN URINE: NORMAL
PLATELET # BLD: 136 K/UL (ref 135–450)
PMV BLD AUTO: 9.1 FL (ref 5–10.5)
PO2, VEN: 38.9 MMHG (ref 25–40)
POTASSIUM REFLEX MAGNESIUM: 3.4 MMOL/L (ref 3.5–5.1)
POTASSIUM SERPL-SCNC: 2.9 MMOL/L (ref 3.5–5.1)
POTASSIUM SERPL-SCNC: 3.4 MMOL/L (ref 3.5–5.1)
PRO-BNP: 223 PG/ML (ref 0–124)
PROPOXYPHENE SCREEN: NORMAL
PROTEIN UA: >300 MG/DL
PROTHROMBIN TIME: 11.4 SEC (ref 10–13.2)
RBC # BLD: 3.89 M/UL (ref 4.2–5.9)
RBC UA: 3 /HPF (ref 0–4)
SALICYLATE, SERUM: <0.3 MG/DL (ref 15–30)
SODIUM BLD-SCNC: 133 MMOL/L (ref 136–145)
SODIUM BLD-SCNC: 133 MMOL/L (ref 136–145)
SPECIFIC GRAVITY UA: >1.03 (ref 1–1.03)
TCO2 CALC VENOUS: 66 MMOL/L
TOTAL PROTEIN: 7.3 G/DL (ref 6.4–8.2)
TOTAL PROTEIN: 8.9 G/DL (ref 6.4–8.2)
TROPONIN: <0.01 NG/ML
URINE REFLEX TO CULTURE: ABNORMAL
URINE TYPE: ABNORMAL
UROBILINOGEN, URINE: 1 E.U./DL
WBC # BLD: 9.5 K/UL (ref 4–11)
WBC UA: 6 /HPF (ref 0–5)

## 2020-07-08 PROCEDURE — 80177 DRUG SCRN QUAN LEVETIRACETAM: CPT

## 2020-07-08 PROCEDURE — 93005 ELECTROCARDIOGRAM TRACING: CPT | Performed by: PHYSICIAN ASSISTANT

## 2020-07-08 PROCEDURE — 84132 ASSAY OF SERUM POTASSIUM: CPT

## 2020-07-08 PROCEDURE — 83880 ASSAY OF NATRIURETIC PEPTIDE: CPT

## 2020-07-08 PROCEDURE — 2580000003 HC RX 258: Performed by: INTERNAL MEDICINE

## 2020-07-08 PROCEDURE — 6360000002 HC RX W HCPCS: Performed by: PHYSICIAN ASSISTANT

## 2020-07-08 PROCEDURE — G0480 DRUG TEST DEF 1-7 CLASSES: HCPCS

## 2020-07-08 PROCEDURE — 2580000003 HC RX 258: Performed by: PHYSICIAN ASSISTANT

## 2020-07-08 PROCEDURE — 1200000000 HC SEMI PRIVATE

## 2020-07-08 PROCEDURE — 85610 PROTHROMBIN TIME: CPT

## 2020-07-08 PROCEDURE — 83690 ASSAY OF LIPASE: CPT

## 2020-07-08 PROCEDURE — 2500000003 HC RX 250 WO HCPCS: Performed by: PHYSICIAN ASSISTANT

## 2020-07-08 PROCEDURE — 96365 THER/PROPH/DIAG IV INF INIT: CPT

## 2020-07-08 PROCEDURE — 84484 ASSAY OF TROPONIN QUANT: CPT

## 2020-07-08 PROCEDURE — 85025 COMPLETE CBC W/AUTO DIFF WBC: CPT

## 2020-07-08 PROCEDURE — 82803 BLOOD GASES ANY COMBINATION: CPT

## 2020-07-08 PROCEDURE — 6360000002 HC RX W HCPCS: Performed by: INTERNAL MEDICINE

## 2020-07-08 PROCEDURE — 87040 BLOOD CULTURE FOR BACTERIA: CPT

## 2020-07-08 PROCEDURE — 80053 COMPREHEN METABOLIC PANEL: CPT

## 2020-07-08 PROCEDURE — 81001 URINALYSIS AUTO W/SCOPE: CPT

## 2020-07-08 PROCEDURE — 94760 N-INVAS EAR/PLS OXIMETRY 1: CPT

## 2020-07-08 PROCEDURE — 96375 TX/PRO/DX INJ NEW DRUG ADDON: CPT

## 2020-07-08 PROCEDURE — 70450 CT HEAD/BRAIN W/O DYE: CPT

## 2020-07-08 PROCEDURE — 80307 DRUG TEST PRSMV CHEM ANLYZR: CPT

## 2020-07-08 PROCEDURE — 36415 COLL VENOUS BLD VENIPUNCTURE: CPT

## 2020-07-08 PROCEDURE — 93010 ELECTROCARDIOGRAM REPORT: CPT | Performed by: INTERNAL MEDICINE

## 2020-07-08 PROCEDURE — 6370000000 HC RX 637 (ALT 250 FOR IP): Performed by: PHYSICIAN ASSISTANT

## 2020-07-08 PROCEDURE — 72125 CT NECK SPINE W/O DYE: CPT

## 2020-07-08 PROCEDURE — 82140 ASSAY OF AMMONIA: CPT

## 2020-07-08 PROCEDURE — 83605 ASSAY OF LACTIC ACID: CPT

## 2020-07-08 PROCEDURE — 83735 ASSAY OF MAGNESIUM: CPT

## 2020-07-08 RX ORDER — ONDANSETRON 2 MG/ML
4 INJECTION INTRAMUSCULAR; INTRAVENOUS EVERY 6 HOURS PRN
Status: DISCONTINUED | OUTPATIENT
Start: 2020-07-08 | End: 2020-07-17 | Stop reason: HOSPADM

## 2020-07-08 RX ORDER — LORAZEPAM 1 MG/1
3 TABLET ORAL
Status: DISCONTINUED | OUTPATIENT
Start: 2020-07-08 | End: 2020-07-08 | Stop reason: SDUPTHER

## 2020-07-08 RX ORDER — LORAZEPAM 2 MG/ML
2 INJECTION INTRAMUSCULAR
Status: DISCONTINUED | OUTPATIENT
Start: 2020-07-08 | End: 2020-07-08 | Stop reason: SDUPTHER

## 2020-07-08 RX ORDER — POTASSIUM CHLORIDE 20 MEQ/1
40 TABLET, EXTENDED RELEASE ORAL ONCE
Status: COMPLETED | OUTPATIENT
Start: 2020-07-08 | End: 2020-07-08

## 2020-07-08 RX ORDER — ONDANSETRON 2 MG/ML
4 INJECTION INTRAMUSCULAR; INTRAVENOUS ONCE
Status: COMPLETED | OUTPATIENT
Start: 2020-07-08 | End: 2020-07-08

## 2020-07-08 RX ORDER — POTASSIUM CHLORIDE 7.45 MG/ML
10 INJECTION INTRAVENOUS PRN
Status: DISCONTINUED | OUTPATIENT
Start: 2020-07-08 | End: 2020-07-17 | Stop reason: HOSPADM

## 2020-07-08 RX ORDER — SODIUM CHLORIDE 9 MG/ML
INJECTION, SOLUTION INTRAVENOUS CONTINUOUS
Status: DISCONTINUED | OUTPATIENT
Start: 2020-07-08 | End: 2020-07-10

## 2020-07-08 RX ORDER — SODIUM CHLORIDE 0.9 % (FLUSH) 0.9 %
10 SYRINGE (ML) INJECTION EVERY 12 HOURS SCHEDULED
Status: DISCONTINUED | OUTPATIENT
Start: 2020-07-08 | End: 2020-07-08 | Stop reason: SDUPTHER

## 2020-07-08 RX ORDER — PROMETHAZINE HYDROCHLORIDE 25 MG/1
12.5 TABLET ORAL EVERY 6 HOURS PRN
Status: DISCONTINUED | OUTPATIENT
Start: 2020-07-08 | End: 2020-07-17 | Stop reason: HOSPADM

## 2020-07-08 RX ORDER — LORAZEPAM 1 MG/1
2 TABLET ORAL
Status: DISCONTINUED | OUTPATIENT
Start: 2020-07-08 | End: 2020-07-09

## 2020-07-08 RX ORDER — LORAZEPAM 1 MG/1
2 TABLET ORAL
Status: DISCONTINUED | OUTPATIENT
Start: 2020-07-08 | End: 2020-07-08 | Stop reason: SDUPTHER

## 2020-07-08 RX ORDER — LORAZEPAM 1 MG/1
4 TABLET ORAL
Status: DISCONTINUED | OUTPATIENT
Start: 2020-07-08 | End: 2020-07-08 | Stop reason: SDUPTHER

## 2020-07-08 RX ORDER — LORAZEPAM 2 MG/ML
3 INJECTION INTRAMUSCULAR
Status: DISCONTINUED | OUTPATIENT
Start: 2020-07-08 | End: 2020-07-08 | Stop reason: SDUPTHER

## 2020-07-08 RX ORDER — LORAZEPAM 2 MG/ML
1 INJECTION INTRAMUSCULAR
Status: DISCONTINUED | OUTPATIENT
Start: 2020-07-08 | End: 2020-07-09

## 2020-07-08 RX ORDER — LORAZEPAM 2 MG/ML
4 INJECTION INTRAMUSCULAR
Status: DISCONTINUED | OUTPATIENT
Start: 2020-07-08 | End: 2020-07-09

## 2020-07-08 RX ORDER — SODIUM CHLORIDE 0.9 % (FLUSH) 0.9 %
10 SYRINGE (ML) INJECTION PRN
Status: DISCONTINUED | OUTPATIENT
Start: 2020-07-08 | End: 2020-07-17 | Stop reason: HOSPADM

## 2020-07-08 RX ORDER — MAGNESIUM SULFATE IN WATER 40 MG/ML
2 INJECTION, SOLUTION INTRAVENOUS ONCE
Status: COMPLETED | OUTPATIENT
Start: 2020-07-08 | End: 2020-07-08

## 2020-07-08 RX ORDER — LORAZEPAM 1 MG/1
3 TABLET ORAL
Status: DISCONTINUED | OUTPATIENT
Start: 2020-07-08 | End: 2020-07-09

## 2020-07-08 RX ORDER — LORAZEPAM 2 MG/ML
1 INJECTION INTRAMUSCULAR
Status: DISCONTINUED | OUTPATIENT
Start: 2020-07-08 | End: 2020-07-08 | Stop reason: SDUPTHER

## 2020-07-08 RX ORDER — LORAZEPAM 2 MG/ML
4 INJECTION INTRAMUSCULAR
Status: DISCONTINUED | OUTPATIENT
Start: 2020-07-08 | End: 2020-07-08 | Stop reason: SDUPTHER

## 2020-07-08 RX ORDER — LORAZEPAM 1 MG/1
1 TABLET ORAL
Status: DISCONTINUED | OUTPATIENT
Start: 2020-07-08 | End: 2020-07-08 | Stop reason: SDUPTHER

## 2020-07-08 RX ORDER — SODIUM CHLORIDE 0.9 % (FLUSH) 0.9 %
10 SYRINGE (ML) INJECTION PRN
Status: DISCONTINUED | OUTPATIENT
Start: 2020-07-08 | End: 2020-07-08 | Stop reason: SDUPTHER

## 2020-07-08 RX ORDER — LORAZEPAM 2 MG/ML
2 INJECTION INTRAMUSCULAR
Status: DISCONTINUED | OUTPATIENT
Start: 2020-07-08 | End: 2020-07-09

## 2020-07-08 RX ORDER — LORAZEPAM 1 MG/1
4 TABLET ORAL
Status: DISCONTINUED | OUTPATIENT
Start: 2020-07-08 | End: 2020-07-09

## 2020-07-08 RX ORDER — LORAZEPAM 2 MG/ML
3 INJECTION INTRAMUSCULAR
Status: DISCONTINUED | OUTPATIENT
Start: 2020-07-08 | End: 2020-07-09

## 2020-07-08 RX ORDER — MAGNESIUM SULFATE IN WATER 40 MG/ML
2 INJECTION, SOLUTION INTRAVENOUS PRN
Status: DISCONTINUED | OUTPATIENT
Start: 2020-07-08 | End: 2020-07-17 | Stop reason: HOSPADM

## 2020-07-08 RX ORDER — SODIUM CHLORIDE 0.9 % (FLUSH) 0.9 %
10 SYRINGE (ML) INJECTION EVERY 12 HOURS SCHEDULED
Status: DISCONTINUED | OUTPATIENT
Start: 2020-07-08 | End: 2020-07-17 | Stop reason: HOSPADM

## 2020-07-08 RX ORDER — LORAZEPAM 1 MG/1
1 TABLET ORAL
Status: DISCONTINUED | OUTPATIENT
Start: 2020-07-08 | End: 2020-07-09

## 2020-07-08 RX ORDER — THIAMINE HYDROCHLORIDE 100 MG/ML
500 INJECTION, SOLUTION INTRAMUSCULAR; INTRAVENOUS DAILY
Status: DISCONTINUED | OUTPATIENT
Start: 2020-07-08 | End: 2020-07-09 | Stop reason: SDUPTHER

## 2020-07-08 RX ADMIN — LORAZEPAM 2 MG: 2 INJECTION INTRAMUSCULAR; INTRAVENOUS at 04:40

## 2020-07-08 RX ADMIN — FOLIC ACID: 5 INJECTION, SOLUTION INTRAMUSCULAR; INTRAVENOUS; SUBCUTANEOUS at 02:39

## 2020-07-08 RX ADMIN — MAGNESIUM SULFATE IN WATER 2 G: 40 INJECTION, SOLUTION INTRAVENOUS at 01:43

## 2020-07-08 RX ADMIN — LORAZEPAM 4 MG: 2 INJECTION INTRAMUSCULAR; INTRAVENOUS at 05:48

## 2020-07-08 RX ADMIN — LORAZEPAM 2 MG: 2 INJECTION INTRAMUSCULAR; INTRAVENOUS at 13:37

## 2020-07-08 RX ADMIN — SODIUM CHLORIDE: 9 INJECTION, SOLUTION INTRAVENOUS at 16:33

## 2020-07-08 RX ADMIN — POTASSIUM CHLORIDE 10 MEQ: 7.46 INJECTION, SOLUTION INTRAVENOUS at 20:39

## 2020-07-08 RX ADMIN — POTASSIUM CHLORIDE 40 MEQ: 1500 TABLET, EXTENDED RELEASE ORAL at 04:39

## 2020-07-08 RX ADMIN — LORAZEPAM 2 MG: 2 INJECTION INTRAMUSCULAR; INTRAVENOUS at 16:24

## 2020-07-08 RX ADMIN — LORAZEPAM 2 MG: 2 INJECTION INTRAMUSCULAR; INTRAVENOUS at 21:37

## 2020-07-08 RX ADMIN — MAGNESIUM SULFATE HEPTAHYDRATE 2 G: 40 INJECTION, SOLUTION INTRAVENOUS at 20:36

## 2020-07-08 RX ADMIN — SODIUM CHLORIDE, POTASSIUM CHLORIDE, SODIUM LACTATE AND CALCIUM CHLORIDE 1000 ML: 600; 310; 30; 20 INJECTION, SOLUTION INTRAVENOUS at 01:54

## 2020-07-08 RX ADMIN — LORAZEPAM 2 MG: 2 INJECTION INTRAMUSCULAR; INTRAVENOUS at 09:35

## 2020-07-08 RX ADMIN — Medication 10 ML: at 20:18

## 2020-07-08 RX ADMIN — LORAZEPAM 4 MG: 2 INJECTION INTRAMUSCULAR; INTRAVENOUS at 22:26

## 2020-07-08 RX ADMIN — THIAMINE HYDROCHLORIDE 500 MG: 100 INJECTION, SOLUTION INTRAMUSCULAR; INTRAVENOUS at 06:46

## 2020-07-08 RX ADMIN — ONDANSETRON 4 MG: 2 INJECTION INTRAMUSCULAR; INTRAVENOUS at 01:55

## 2020-07-08 RX ADMIN — Medication 10 ML: at 09:35

## 2020-07-08 RX ADMIN — POTASSIUM CHLORIDE 10 MEQ: 7.46 INJECTION, SOLUTION INTRAVENOUS at 23:17

## 2020-07-08 RX ADMIN — LORAZEPAM 2 MG: 2 INJECTION INTRAMUSCULAR; INTRAVENOUS at 20:18

## 2020-07-08 RX ADMIN — Medication 10 ML: at 05:48

## 2020-07-08 RX ADMIN — LORAZEPAM 4 MG: 2 INJECTION INTRAMUSCULAR; INTRAVENOUS at 23:47

## 2020-07-08 RX ADMIN — Medication 10 ML: at 21:37

## 2020-07-08 RX ADMIN — ENOXAPARIN SODIUM 40 MG: 40 INJECTION SUBCUTANEOUS at 09:34

## 2020-07-08 RX ADMIN — POTASSIUM CHLORIDE 10 MEQ: 7.46 INJECTION, SOLUTION INTRAVENOUS at 21:48

## 2020-07-08 ASSESSMENT — PAIN SCALES - GENERAL
PAINLEVEL_OUTOF10: 0

## 2020-07-08 NOTE — ED PROVIDER NOTES
time that he is experiencing chest pain, palpitations lightheadedness or shortness of breath. He denies any additional GI or  complaints. When asked how he got the small bump on the front of his right forehead he was unaware that this was present. Shortly after the patient's arrival to the emergency department his mother who is someone who contacted emergency medical services was here in the emergency department. She confirms to me that she heard a thud. She states that she went from the living room down to the basement where the patient was. She states when she got to the basement he was having tonic-clonic seizure activity and she thinks that this lasted in excess of 2 or 3 minutes. Despite the fact that it is reported the patient takes Depakote for seizure related disorder she states that is not the case because he has insurance issues and has not had seizure medication. She tells me he is also not followed up on an outpatient basis since the last time he had alcohol withdrawal seizure back in November. Nursing Notes were all reviewed and agreed with or any disagreements were addressed in the HPI. REVIEW OF SYSTEMS    (2-9 systems for level 4, 10 or more for level 5)     Review of Systems   Unable to perform ROS: Mental status change       Positives and Pertinent negatives as per HPI. Except as noted above in the ROS, all other systems were reviewed and negative.        PAST MEDICAL HISTORY     Past Medical History:   Diagnosis Date    Alcohol withdrawal ([facility] [address] 10/12/2017    Seizure ([facility] [address]          SURGICAL HISTORY     Past Surgical History:   Procedure Laterality Date    WRIST SURGERY Right          CURRENTMEDICATIONS       Previous Medications    LEVETIRACETAM (KEPPRA) 500 MG TABLET    Take 1 tablet by mouth 2 times daily         ALLERGIES     Other and Seasonal    FAMILYHISTORY       Family History   Problem Relation Age of Onset    Cancer Mother     Cancer Father           SOCIAL HISTORY Social History     Tobacco Use    Smoking status: Current Every Day Smoker     Packs/day: 0.50     Years: 31.00     Pack years: 15.50    Smokeless tobacco: Never Used   Substance Use Topics    Alcohol use: Yes     Comment: Drinks 10-15 every day.  Drug use: Yes     Types: Marijuana     Comment: Occ       SCREENINGS             PHYSICAL EXAM    (up to 7 for level 4, 8 or more for level 5)     ED Triage Vitals [07/07/20 2327]   BP Temp Temp Source Pulse Resp SpO2 Height Weight   (!) 183/110 98.9 °F (37.2 °C) Oral 93 16 100 % 5' 9\" (1.753 m) 160 lb (72.6 kg)       Physical Exam  Vitals signs and nursing note reviewed. Constitutional:       General: He is awake. He is not in acute distress. Appearance: Normal appearance. He is well-developed. He is not ill-appearing or diaphoretic. HENT:      Head: Normocephalic and atraumatic. No raccoon eyes, Del Rosario's sign, contusion or laceration. Jaw: There is normal jaw occlusion. Right Ear: Hearing, tympanic membrane, ear canal and external ear normal. No hemotympanum. Left Ear: Hearing, tympanic membrane, ear canal and external ear normal. No hemotympanum. Nose: Nose normal.      Mouth/Throat:      Lips: Pink. Mouth: Mucous membranes are moist.      Comments: No evidence of tongue biting. Oropharynx unremarkable. Eyes:      General: No scleral icterus. Right eye: No discharge. Left eye: No discharge. Conjunctiva/sclera: Conjunctivae normal.   Neck:      Musculoskeletal: Normal range of motion. Vascular: No JVD. Cardiovascular:      Rate and Rhythm: Normal rate and regular rhythm. Heart sounds: No murmur. No friction rub. No gallop. Pulmonary:      Effort: Pulmonary effort is normal. No accessory muscle usage or respiratory distress. Breath sounds: Normal breath sounds. No wheezing, rhonchi or rales. Abdominal:      General: Abdomen is flat. There is no distension.       Palpations: Abdomen is soft. Abdomen is not rigid. There is no mass. Tenderness: There is no abdominal tenderness. There is no guarding or rebound. Comments: Clothing dry without evidence of loss of bowel or bladder function. Skin:     General: Skin is warm and dry. Neurological:      Mental Status: He is alert and oriented to person, place, and time. GCS: GCS eye subscore is 4. GCS verbal subscore is 5. GCS motor subscore is 6. Cranial Nerves: No cranial nerve deficit, dysarthria or facial asymmetry. Sensory: No sensory deficit. Motor: No tremor, seizure activity or pronator drift. Coordination: Coordination normal. Finger-Nose-Finger Test normal.      Comments: Appears mildly confused but does not appear postictal.  Mild word slurring noted. Psychiatric:         Behavior: Behavior is uncooperative.          DIAGNOSTIC RESULTS   LABS:    Labs Reviewed   CBC WITH AUTO DIFFERENTIAL - Abnormal; Notable for the following components:       Result Value    RBC 3.89 (*)     Hemoglobin 13.3 (*)     Hematocrit 37.9 (*)     MCH 34.2 (*)     Lymphocytes Absolute 0.9 (*)     All other components within normal limits    Narrative:     Performed at:  [facility] [address], [place], [facility] [place]   Phone [phone]   COMPREHENSIVE METABOLIC PANEL W/ REFLEX TO MG FOR LOW K - Abnormal; Notable for the following components:    Sodium 133 (*)     Potassium reflex Magnesium 3.4 (*)     Chloride 84 (*)     Anion Gap 24 (*)     Total Protein 8.9 (*)     Alb 5.1 (*)     Total Bilirubin 2.2 (*)      (*)      (*)     All other components within normal limits    Narrative:     Performed at:  [facility] [address], [place], [facility] [place]   Phone [phone]   BRAIN NATRIURETIC PEPTIDE - Abnormal; Notable for the following components:    Pro- (*)     All other components within normal limits    Narrative:     Performed at:  [facility] [address], [facility], [address]   Phone [phone]   ACETAMINOPHEN LEVEL - Abnormal; Notable for the following components:    Acetaminophen Level <5 (*)     All other components within normal limits    Narrative:     Performed at:  [facility] [address], [facility], [address]   Phone [phone]   SALICYLATE LEVEL - Abnormal; Notable for the following components:    Salicylate, Serum <8.9 (*)     All other components within normal limits    Narrative:     Performed at:  [facility] [address], [facility], [address]   Phone [phone]   LACTATE, SEPSIS - Abnormal; Notable for the following components:    Lactic Acid, Sepsis 4.9 (*)     All other components within normal limits    Narrative:     Juli Ness  [facility] tel. [phone],  Chemistry results called to and read back by CHANO Noriega, 07/08/2020  01:19, by James Carbone  Performed at:  [facility] [address], [facility], [address]   Phone [phone]   URINE RT REFLEX TO CULTURE - Abnormal; Notable for the following components:    Bilirubin Urine SMALL (*)     Ketones, Urine >=80 (*)     Blood, Urine LARGE (*)     All other components within normal limits    Narrative:     Performed at:  [facility] [address], [facility], [address]   Phone [phone]   BLOOD GAS, VENOUS - Abnormal; Notable for the following components:    Carboxyhemoglobin 3.7 (*)     All other components within normal limits    Narrative:     Performed at:  [facility] [address], [facility], [address]   Phone [phone]   MICROSCOPIC URINALYSIS - Abnormal; Notable for the following components:    Hyaline Casts, UA 18 (*)     WBC, UA 6 (*)     All other components within normal limits    Narrative:     Performed at:  [facility]) - Please see their note for interpretation of EKG. RADIOLOGY:   Non-plain film images such as CT, Ultrasound and MRI are read by the radiologist. Plain radiographic images are visualized and preliminarily interpreted by the ED Provider with the below findings:        Interpretation per the Radiologist below, if available at the time of this note:    CT Cervical Spine WO Contrast   Final Result   No acute abnormality of the cervical spine. CT Head WO Contrast   Final Result   No acute intracranial abnormality. PROCEDURES   Unless otherwise noted below, none     Procedures    CRITICAL CARE TIME   Because of the high probability of sudden clinical deterioration of the patients condition and to prevent further deterioration, my critical care time involved my full attention to the patients condition, and included chart data review, documentation, medication ordering, viewing the patients old records, reevaluation of the patient's cardiac, pulmonary, and neurological status. Reassessing vital signs. Consutlations with off service physician. Ordering, interpreting reviewing diagnostic testing. Therefore my critical care time was 32 minutes of direct attention to the patients condition and did not include time spent on procedures.     SEP-1 CORE MEASURE DATA    Alternative explanation for abnormal labs, specifically Elevated lactic acid appears to be related to Seizure related activity and not sepsis      CONSULTS:  IP CONSULT TO HOSPITALIST  IP CONSULT TO SOCIAL WORK      EMERGENCY DEPARTMENT COURSE and DIFFERENTIAL DIAGNOSIS/MDM:   Vitals:    Vitals:    07/07/20 2330 07/07/20 2345 07/08/20 0030 07/08/20 0100   BP: (!) 181/114 (!) 178/113 (!) 161/107    Pulse:  94 102 102   Resp:  14 18 18   Temp:       TempSrc:       SpO2: 99% 98% 97% 97%   Weight:       Height:           Patient was given the following medications:  Medications   sodium chloride flush 0.9 % injection 10 mL (has no administration in time range)   sodium chloride flush 0.9 % injection 10 mL (has no administration in time range)   LORazepam (ATIVAN) tablet 1 mg (has no administration in time range)     Or   LORazepam (ATIVAN) injection 1 mg (has no administration in time range)     Or   LORazepam (ATIVAN) tablet 2 mg (has no administration in time range)     Or   LORazepam (ATIVAN) injection 2 mg (has no administration in time range)     Or   LORazepam (ATIVAN) tablet 3 mg (has no administration in time range)     Or   LORazepam (ATIVAN) injection 3 mg (has no administration in time range)     Or   LORazepam (ATIVAN) tablet 4 mg (has no administration in time range)     Or   LORazepam (ATIVAN) injection 4 mg (has no administration in time range)   sodium chloride flush 0.9 % injection 10 mL (has no administration in time range)   potassium chloride (KLOR-CON M) extended release tablet 40 mEq (has no administration in time range)   lactated ringers infusion 1,000 mL (0 mLs Intravenous Stopped 7/8/20 0253)   magnesium sulfate 2 g in 50 mL IVPB premix (2 g Intravenous New Bag 7/8/20 0143)   ondansetron (ZOFRAN) injection 4 mg (4 mg Intravenous Given 7/8/20 0155)   sodium chloride 0.9 % 8,292 mL with folic acid 1 mg, adult multi-vitamin with vitamin k 10 mL, thiamine 300 mg ( Intravenous New Bag 7/8/20 0239)           The patient's detailed history of present illness is documented as above. Upon arrival to the emergency department the patient's vital signs are as documented. The patient is noted to be hemodynamically stable and afebrile. Physical examination findings are as above. Patient is mildly confused upon initial evaluation. IV access was obtained. Laboratory testing and work-up was initiated. Seizure precautions were initiated as well as there is a high likelihood the patient did indeed have the seizure that was reported above. CIWA protocol was initiated. IV hydration a banana bag was ordered as above.   Patient CBC does demonstrate evidence of leukocytosis. Mild anemia with a hemoglobin of 13.3 hematocrit of 37.9. No evidence of thrombocytopenia or thrombocytosis. Incidental lymphopenia noted. BUN 17 creatinine 0.9. Nonfasting glucose 95 his sodium is 133 potassium 3.4 chloride 84 with a gap of 24. Patient's lactic acid is elevated at 4.9 which in the setting of not having any potential source for infection is likely a result of seizure related activity. Patient's total bili is 2.2. Evidence of transaminitis at 217 and 226 respectively. Paces 29. Troponin is less than 0.01. BNP is 223. INR is 0.98. Ammonia is 22. Patient's ethanol level returned undetectable. Tylenol, salicylate levels are normal limits. Venous blood gas demonstrates a normal pH despite his elevated lactic acid exam is also noted to be at low at 0.90. This and his potassium replaced as above. Toxicology screening is negative. Urinalysis demonstrates no evidence of infection. CT of the head demonstrates no evidence of acute intercranial abnormality while CT imaging of the cervical spine demonstrates no evidence of acute fracture or dislocation. Generalized spondylosis is noted. On recheck the mild level of confusion the patient had shortly after arrival does not appear present. He now is able to answer all of his questions. He had a lengthy discussion with the attending physician. He is aware of risk associated with the events that occurred tonight. Patient will be admitted to the medical surgical services for ongoing care management the diagnoses below. FINAL IMPRESSION      1. Seizure ([facility] [address]    2.  Alcohol withdrawal syndrome with complication ([facility] [address]          DISPOSITION/PLAN   DISPOSITION: Admit         (Please note that portions of this note were completed with a voice recognition program.  Efforts were made to edit the dictations but occasionally words are mis-transcribed.)    Rose Marie Sanchez PA-C (electronically signed)

## 2020-07-08 NOTE — ED NOTES
Nursing report called to Renata Barrientos RN on receiving unit. RN accepts patient and has no further questions.      Rafa Obregon RN  07/08/20 8160

## 2020-07-08 NOTE — ED NOTES
Lab reported Lactic Acid 4.9. SUZETTE Gan notified.      Josue Youssef, RN  07/08/20 9973 [facility], RN  07/08/20 1256

## 2020-07-08 NOTE — PROGRESS NOTES
Admission assessment complete. Pt alert and oriented x3 with rambling speech. Telemetry on. Seizure precautions in place. Vital signs stable. CIWA score 28 and 4mg of IV ativan given per MAR. Fall risk precautions in place. Will continue to monitor.

## 2020-07-08 NOTE — PROGRESS NOTES
H/p dictation id [id number]. Date of service 07/08/20. Delirium tremens. Hypomagnesemia. Hypokalemia. Chronic alcoholism.

## 2020-07-08 NOTE — ED PROVIDER NOTES
I independently performed a history and physical on Alison Green. All diagnostic, treatment, and disposition decisions were made by myself in conjunction with the advanced practice provider. Briefly, this is a 54 y.o. male here for unresponsiveness. Mom found him unresponsive. Last alcohol intake was yesterday evening. Usually drinks a half a bottle of vodka a day. Also states that he did cocaine. Felt chills. Not having any headache. Does feel nausea. No abdominal pain. Does have tremor which he gets from withdrawal.  He is not convinced that he is withdrawing. On exam,   General: Patient is tremulous, answering questions appropriately  Skin: No cyanosis  HEENT: Dry mucous membranes  Heart: Tachy rate, regular rhythm  Lung: No respiratory distress, no wheezes  Abdomen: Soft, nontender  Neuro: Moving all extremities, no facial droop, no slurred speech, answers questions appropriately, tremor b/l hands        EKG  The Ekg interpreted by me in the absence of a cardiologist shows. Normal sinus rhythm  No acute ST changes or T wave abnormalities     Screenings            MDM  Patient is a 40-year-old man with past medical history of alcohol abuse with withdrawal seizures who presents with likely episode of unresponsiveness due to seizure. Has elevated lactate. Afebrile but tachycardic and alcohol level is 0. Initially he was encephalopathic, possibly due to postictal state. Did appear dehydrated. Now alert and oriented x3 although initially he thought that [employer] was the president. will place on CIWA protocol and give Ativan. Anticipate admission for alcohol withdrawal seizure. Lactate is elevated however likely due to seizure. Afebrile here. No headache to indicate meningitis or neck stiffness. No photophobia. Patient initially wanted to leave [address] however I convinced him to stay after a long discussion.      The total critical care time spent while evaluating and treating this patient was at least 35 minutes. This excludes time spent doing separately billable procedures. This includes time at the bedside, data interpretation, medication management, obtaining critical history from collateral sources if the patient is unable to provide it directly, and physician consultation. Specifics of interventions taken and potentially life-threatening diagnostic considerations are listed above in the medical decision making. Patient Referrals:  No follow-up provider specified. Discharge Medications:  New Prescriptions    No medications on file       FINAL IMPRESSION  1. Seizure ([address]    2. Alcohol withdrawal syndrome with complication (HCC)        Blood pressure (!) 161/107, pulse 102, temperature 98.9 °F (37.2 °C), temperature source Oral, resp. rate 18, height 5' 9\" (1.753 m), weight 160 lb (72.6 kg), SpO2 97 %. For further details of Verenice Silva emergency department encounter, please see documentation by advanced practice provider, Kris Burr.        Yarely Davis MD  07/08/20 9145

## 2020-07-08 NOTE — H&P
[facility]                     [facility], [address]                              HISTORY AND PHYSICAL    PATIENT NAME: Leanna Reilly                     :        1975  MED REC NO:   [id number]                          ROOM:       6735  ACCOUNT NO:   [de-identified]                           ADMIT DATE: 2020  PROVIDER:     Chris Smith MD    DATE OF SERVICE:  I obtained the history and performed physical exam on  the patient in the emergency room on 2020. CHIEF COMPLAINT:  The patient is unable to provide any chief complaint. He is actively hallucinating at this point in time and tremulous. All  history obtained from the chart. The patient was admitted with being  found down and seizures. The patient is unable to provide any history  whatsoever. PAST MEDICAL/PAST SURGICAL HISTORY:  Chronic alcoholism with a history  of alcohol induced seizures. ALLERGIC HISTORY:  SEASONAL. FAMILY HISTORY:  Unobtainable from the patient. SOCIAL HISTORY:  Chronic alcoholic. There is also a note that the  patient does use cocaine as well. REVIEW OF SYSTEMS:  Unobtainable from the patient because of the  patient's mentation. MEDICATIONS:  The patient is supposed to be on Keppra at home. PHYSICAL EXAMINATION:  VITAL SIGNS:  Temperature 99.2, respiratory rate 19, pulse 100-120,  blood pressure 161/107, saturating 97%. CNS:  Tremulous, hallucinating, is not able to maintain a coherent  conversation with significant bilateral horizontal gaze nystagmus. PSYCH:  The patient is having florid hallucinations. EYES:  Pupils are bilaterally equal.  ENT:  Oral mucosal lesions are not noted. RESPIRATORY SYSTEM:  Non-labored symmetrical chest wall movement. CVS:  Tachycardic. ABDOMEN:  Shows no evidence of guarding, rigidity, or rebound. MUSCULOSKELETAL:  No acute deformities.   SKIN:  The patient is diaphoretic and very tremulous. DIAGNOSTIC DATA:  EKG shows normal sinus rhythm. CBC showed hemoglobin  13.3, hematocrit 37.9. Calcium 10.1, sodium 133, potassium 3.4,  chloride 84. Protein 8.9, albumin 5.1. BUN 17, creatinine 0.9. Lactic  acid is 4.9. Alcohol not detected. Magnesium 0.9.  _____ head within  normal limits. Urine drug screen does not show any evidence of any  abnormal drugs. CT C-spine within normal limits. INR 0.98.    ASSESSMENT:  1. Delirium tremens. 2.  Severe acute alcohol induced hypomagnesemia and hypokalemia. PLAN OF CARE:  The patient is admitted to the Internal Medicine Service. CIWA protocol initiated. High dose thiamine single dose given. N.p.o.  status will be continued at this point in time given the patient's  mentation. If the patient requires escalating doses of sedating agents,  we will move the patient to a critical care setting. CODE STATUS:  Full. EXPECTED LENGTH OF STAY:  More than two midnights based on plan of care  above. RISK:  High due to the patient's delirium tremens with the possibility  that the patient indeed had alcohol withdrawal seizure at the time of  presentation.         Elida Edmond MD    D: 07/08/2020 6:02:26       T: 07/08/2020 7:27:50     [email or web address]  Job#: [id number]     Doc#: [id number]    CC:

## 2020-07-09 LAB
A/G RATIO: 1.1 (ref 1.1–2.2)
ALBUMIN SERPL-MCNC: 4 G/DL (ref 3.4–5)
ALP BLD-CCNC: 40 U/L (ref 40–129)
ALT SERPL-CCNC: 152 U/L (ref 10–40)
ANION GAP SERPL CALCULATED.3IONS-SCNC: 22 MMOL/L (ref 3–16)
ANION GAP SERPL CALCULATED.3IONS-SCNC: 23 MMOL/L (ref 3–16)
AST SERPL-CCNC: 137 U/L (ref 15–37)
BASOPHILS ABSOLUTE: 0 K/UL (ref 0–0.2)
BASOPHILS RELATIVE PERCENT: 0.3 %
BILIRUB SERPL-MCNC: 2.5 MG/DL (ref 0–1)
BUN BLDV-MCNC: 10 MG/DL (ref 7–20)
BUN BLDV-MCNC: 8 MG/DL (ref 7–20)
CALCIUM SERPL-MCNC: 8.6 MG/DL (ref 8.3–10.6)
CALCIUM SERPL-MCNC: 8.8 MG/DL (ref 8.3–10.6)
CHLORIDE BLD-SCNC: 90 MMOL/L (ref 99–110)
CHLORIDE BLD-SCNC: 91 MMOL/L (ref 99–110)
CO2: 20 MMOL/L (ref 21–32)
CO2: 20 MMOL/L (ref 21–32)
CREAT SERPL-MCNC: 0.6 MG/DL (ref 0.9–1.3)
CREAT SERPL-MCNC: <0.5 MG/DL (ref 0.9–1.3)
EOSINOPHILS ABSOLUTE: 0 K/UL (ref 0–0.6)
EOSINOPHILS RELATIVE PERCENT: 0.2 %
GFR AFRICAN AMERICAN: >60
GFR AFRICAN AMERICAN: >60
GFR NON-AFRICAN AMERICAN: >60
GFR NON-AFRICAN AMERICAN: >60
GLOBULIN: 3.5 G/DL
GLUCOSE BLD-MCNC: 102 MG/DL (ref 70–99)
GLUCOSE BLD-MCNC: 68 MG/DL (ref 70–99)
GLUCOSE BLD-MCNC: 71 MG/DL (ref 70–99)
GLUCOSE BLD-MCNC: 84 MG/DL (ref 70–99)
HCT VFR BLD CALC: 35.5 % (ref 40.5–52.5)
HEMOGLOBIN: 12.2 G/DL (ref 13.5–17.5)
LYMPHOCYTES ABSOLUTE: 1.2 K/UL (ref 1–5.1)
LYMPHOCYTES RELATIVE PERCENT: 11.5 %
MAGNESIUM: 1.4 MG/DL (ref 1.8–2.4)
MAGNESIUM: 1.9 MG/DL (ref 1.8–2.4)
MCH RBC QN AUTO: 34.9 PG (ref 26–34)
MCHC RBC AUTO-ENTMCNC: 34.4 G/DL (ref 31–36)
MCV RBC AUTO: 101.2 FL (ref 80–100)
MONOCYTES ABSOLUTE: 1 K/UL (ref 0–1.3)
MONOCYTES RELATIVE PERCENT: 9.1 %
NEUTROPHILS ABSOLUTE: 8.4 K/UL (ref 1.7–7.7)
NEUTROPHILS RELATIVE PERCENT: 78.9 %
PDW BLD-RTO: 13.7 % (ref 12.4–15.4)
PERFORMED ON: ABNORMAL
PERFORMED ON: ABNORMAL
PLATELET # BLD: 120 K/UL (ref 135–450)
PMV BLD AUTO: 8.2 FL (ref 5–10.5)
POTASSIUM REFLEX MAGNESIUM: 3.4 MMOL/L (ref 3.5–5.1)
POTASSIUM SERPL-SCNC: 3.6 MMOL/L (ref 3.5–5.1)
POTASSIUM SERPL-SCNC: 3.6 MMOL/L (ref 3.5–5.1)
RBC # BLD: 3.5 M/UL (ref 4.2–5.9)
SODIUM BLD-SCNC: 132 MMOL/L (ref 136–145)
SODIUM BLD-SCNC: 134 MMOL/L (ref 136–145)
TOTAL PROTEIN: 7.5 G/DL (ref 6.4–8.2)
WBC # BLD: 10.6 K/UL (ref 4–11)

## 2020-07-09 PROCEDURE — 6360000002 HC RX W HCPCS: Performed by: INTERNAL MEDICINE

## 2020-07-09 PROCEDURE — 85025 COMPLETE CBC W/AUTO DIFF WBC: CPT

## 2020-07-09 PROCEDURE — 2580000003 HC RX 258: Performed by: FAMILY MEDICINE

## 2020-07-09 PROCEDURE — 2000000000 HC ICU R&B

## 2020-07-09 PROCEDURE — 2580000003 HC RX 258: Performed by: INTERNAL MEDICINE

## 2020-07-09 PROCEDURE — 83735 ASSAY OF MAGNESIUM: CPT

## 2020-07-09 PROCEDURE — 6370000000 HC RX 637 (ALT 250 FOR IP): Performed by: INTERNAL MEDICINE

## 2020-07-09 PROCEDURE — 84132 ASSAY OF SERUM POTASSIUM: CPT

## 2020-07-09 PROCEDURE — 83036 HEMOGLOBIN GLYCOSYLATED A1C: CPT

## 2020-07-09 PROCEDURE — 36415 COLL VENOUS BLD VENIPUNCTURE: CPT

## 2020-07-09 PROCEDURE — 94760 N-INVAS EAR/PLS OXIMETRY 1: CPT

## 2020-07-09 PROCEDURE — 80053 COMPREHEN METABOLIC PANEL: CPT

## 2020-07-09 RX ORDER — DEXTROSE MONOHYDRATE 25 G/50ML
12.5 INJECTION, SOLUTION INTRAVENOUS PRN
Status: DISCONTINUED | OUTPATIENT
Start: 2020-07-09 | End: 2020-07-17 | Stop reason: HOSPADM

## 2020-07-09 RX ORDER — LORAZEPAM 2 MG/ML
8 INJECTION INTRAMUSCULAR ONCE
Status: DISCONTINUED | OUTPATIENT
Start: 2020-07-09 | End: 2020-07-09

## 2020-07-09 RX ORDER — LORAZEPAM 1 MG/1
2 TABLET ORAL
Status: DISCONTINUED | OUTPATIENT
Start: 2020-07-09 | End: 2020-07-17 | Stop reason: HOSPADM

## 2020-07-09 RX ORDER — NICOTINE POLACRILEX 4 MG
15 LOZENGE BUCCAL PRN
Status: DISCONTINUED | OUTPATIENT
Start: 2020-07-09 | End: 2020-07-17 | Stop reason: HOSPADM

## 2020-07-09 RX ORDER — LORAZEPAM 2 MG/ML
4 INJECTION INTRAMUSCULAR
Status: DISCONTINUED | OUTPATIENT
Start: 2020-07-09 | End: 2020-07-16

## 2020-07-09 RX ORDER — LANOLIN ALCOHOL/MO/W.PET/CERES
400 CREAM (GRAM) TOPICAL 2 TIMES DAILY
Status: DISCONTINUED | OUTPATIENT
Start: 2020-07-09 | End: 2020-07-17 | Stop reason: HOSPADM

## 2020-07-09 RX ORDER — MULTIVITAMIN WITH IRON
1 TABLET ORAL DAILY
Status: DISCONTINUED | OUTPATIENT
Start: 2020-07-09 | End: 2020-07-17 | Stop reason: HOSPADM

## 2020-07-09 RX ORDER — LORAZEPAM 2 MG/ML
2 INJECTION INTRAMUSCULAR
Status: DISCONTINUED | OUTPATIENT
Start: 2020-07-09 | End: 2020-07-16

## 2020-07-09 RX ORDER — LORAZEPAM 2 MG/ML
1 INJECTION INTRAMUSCULAR
Status: DISCONTINUED | OUTPATIENT
Start: 2020-07-09 | End: 2020-07-16

## 2020-07-09 RX ORDER — FOLIC ACID 1 MG/1
1 TABLET ORAL DAILY
Status: DISCONTINUED | OUTPATIENT
Start: 2020-07-09 | End: 2020-07-17 | Stop reason: HOSPADM

## 2020-07-09 RX ORDER — PHENOBARBITAL SODIUM 130 MG/ML
260 INJECTION INTRAMUSCULAR ONCE
Status: DISCONTINUED | OUTPATIENT
Start: 2020-07-09 | End: 2020-07-09 | Stop reason: SDUPTHER

## 2020-07-09 RX ORDER — POTASSIUM CHLORIDE 20 MEQ/1
40 TABLET, EXTENDED RELEASE ORAL 2 TIMES DAILY WITH MEALS
Status: DISCONTINUED | OUTPATIENT
Start: 2020-07-09 | End: 2020-07-17 | Stop reason: HOSPADM

## 2020-07-09 RX ORDER — CHLORDIAZEPOXIDE HYDROCHLORIDE 25 MG/1
50 CAPSULE, GELATIN COATED ORAL 3 TIMES DAILY
Status: DISCONTINUED | OUTPATIENT
Start: 2020-07-09 | End: 2020-07-15

## 2020-07-09 RX ORDER — LORAZEPAM 1 MG/1
4 TABLET ORAL
Status: DISCONTINUED | OUTPATIENT
Start: 2020-07-09 | End: 2020-07-17 | Stop reason: HOSPADM

## 2020-07-09 RX ORDER — DEXTROSE MONOHYDRATE 50 MG/ML
100 INJECTION, SOLUTION INTRAVENOUS PRN
Status: DISCONTINUED | OUTPATIENT
Start: 2020-07-09 | End: 2020-07-17 | Stop reason: HOSPADM

## 2020-07-09 RX ORDER — LORAZEPAM 2 MG/ML
3 INJECTION INTRAMUSCULAR
Status: DISCONTINUED | OUTPATIENT
Start: 2020-07-09 | End: 2020-07-16

## 2020-07-09 RX ORDER — LORAZEPAM 1 MG/1
1 TABLET ORAL
Status: DISCONTINUED | OUTPATIENT
Start: 2020-07-09 | End: 2020-07-17 | Stop reason: HOSPADM

## 2020-07-09 RX ORDER — LORAZEPAM 2 MG/ML
10 INJECTION INTRAMUSCULAR ONCE
Status: DISCONTINUED | OUTPATIENT
Start: 2020-07-09 | End: 2020-07-09

## 2020-07-09 RX ORDER — LORAZEPAM 1 MG/1
3 TABLET ORAL
Status: DISCONTINUED | OUTPATIENT
Start: 2020-07-09 | End: 2020-07-17 | Stop reason: HOSPADM

## 2020-07-09 RX ADMIN — LORAZEPAM 4 MG: 2 INJECTION INTRAMUSCULAR; INTRAVENOUS at 18:39

## 2020-07-09 RX ADMIN — DEXTROSE MONOHYDRATE 12.5 G: 25 INJECTION, SOLUTION INTRAVENOUS at 20:37

## 2020-07-09 RX ADMIN — PHENOBARBITAL SODIUM 130 MG: 130 INJECTION INTRAMUSCULAR at 08:27

## 2020-07-09 RX ADMIN — LORAZEPAM 4 MG: 2 INJECTION INTRAMUSCULAR; INTRAVENOUS at 12:36

## 2020-07-09 RX ADMIN — THIAMINE HYDROCHLORIDE 500 MG: 100 INJECTION, SOLUTION INTRAMUSCULAR; INTRAVENOUS at 11:00

## 2020-07-09 RX ADMIN — Medication 10 ML: at 12:36

## 2020-07-09 RX ADMIN — POTASSIUM CHLORIDE 10 MEQ: 7.46 INJECTION, SOLUTION INTRAVENOUS at 02:29

## 2020-07-09 RX ADMIN — SODIUM CHLORIDE: 9 INJECTION, SOLUTION INTRAVENOUS at 16:58

## 2020-07-09 RX ADMIN — LORAZEPAM 4 MG: 2 INJECTION INTRAMUSCULAR; INTRAVENOUS at 03:22

## 2020-07-09 RX ADMIN — CHLORDIAZEPOXIDE HYDROCHLORIDE 50 MG: 25 CAPSULE ORAL at 22:11

## 2020-07-09 RX ADMIN — LORAZEPAM 4 MG: 2 INJECTION INTRAMUSCULAR; INTRAVENOUS at 22:20

## 2020-07-09 RX ADMIN — LORAZEPAM 4 MG: 2 INJECTION INTRAMUSCULAR; INTRAVENOUS at 15:20

## 2020-07-09 RX ADMIN — LORAZEPAM 4 MG: 2 INJECTION INTRAMUSCULAR; INTRAVENOUS at 13:36

## 2020-07-09 RX ADMIN — Medication 10 ML: at 02:20

## 2020-07-09 RX ADMIN — POTASSIUM CHLORIDE 10 MEQ: 7.46 INJECTION, SOLUTION INTRAVENOUS at 10:17

## 2020-07-09 RX ADMIN — CHLORDIAZEPOXIDE HYDROCHLORIDE 50 MG: 25 CAPSULE ORAL at 13:42

## 2020-07-09 RX ADMIN — CHLORDIAZEPOXIDE HYDROCHLORIDE 50 MG: 25 CAPSULE ORAL at 08:16

## 2020-07-09 RX ADMIN — Medication 10 ML: at 20:37

## 2020-07-09 RX ADMIN — PHENOBARBITAL SODIUM 130 MG: 130 INJECTION INTRAMUSCULAR at 10:16

## 2020-07-09 RX ADMIN — LORAZEPAM 4 MG: 2 INJECTION INTRAMUSCULAR; INTRAVENOUS at 05:03

## 2020-07-09 RX ADMIN — POTASSIUM CHLORIDE 10 MEQ: 7.46 INJECTION, SOLUTION INTRAVENOUS at 06:37

## 2020-07-09 RX ADMIN — Medication 10 ML: at 07:56

## 2020-07-09 RX ADMIN — POTASSIUM CHLORIDE 10 MEQ: 7.46 INJECTION, SOLUTION INTRAVENOUS at 01:28

## 2020-07-09 RX ADMIN — POTASSIUM CHLORIDE 10 MEQ: 7.46 INJECTION, SOLUTION INTRAVENOUS at 09:08

## 2020-07-09 RX ADMIN — LORAZEPAM 4 MG: 2 INJECTION INTRAMUSCULAR; INTRAVENOUS at 16:58

## 2020-07-09 RX ADMIN — MAGNESIUM GLUCONATE 500 MG ORAL TABLET 400 MG: 500 TABLET ORAL at 22:12

## 2020-07-09 RX ADMIN — ENOXAPARIN SODIUM 40 MG: 40 INJECTION SUBCUTANEOUS at 07:56

## 2020-07-09 RX ADMIN — LORAZEPAM 4 MG: 2 INJECTION INTRAMUSCULAR; INTRAVENOUS at 00:58

## 2020-07-09 RX ADMIN — LORAZEPAM 4 MG: 2 INJECTION INTRAMUSCULAR; INTRAVENOUS at 02:20

## 2020-07-09 RX ADMIN — POTASSIUM CHLORIDE 10 MEQ: 7.46 INJECTION, SOLUTION INTRAVENOUS at 08:00

## 2020-07-09 RX ADMIN — LORAZEPAM 4 MG: 2 INJECTION INTRAMUSCULAR; INTRAVENOUS at 03:21

## 2020-07-09 RX ADMIN — MAGNESIUM SULFATE HEPTAHYDRATE 2 G: 40 INJECTION, SOLUTION INTRAVENOUS at 08:13

## 2020-07-09 RX ADMIN — PHENOBARBITAL SODIUM 260 MG: 130 INJECTION INTRAMUSCULAR at 06:20

## 2020-07-09 RX ADMIN — POTASSIUM CHLORIDE 10 MEQ: 7.46 INJECTION, SOLUTION INTRAVENOUS at 00:21

## 2020-07-09 RX ADMIN — SODIUM CHLORIDE: 9 INJECTION, SOLUTION INTRAVENOUS at 05:35

## 2020-07-09 ASSESSMENT — PAIN SCALES - GENERAL
PAINLEVEL_OUTOF10: 0
PAINLEVEL_OUTOF10: 0

## 2020-07-09 NOTE — PROGRESS NOTES
[address] [place] [address] PROGRESS NOTE    7/9/2020 3:24 PM        Name: Dank Fleming . Admitted: 7/7/2020  Primary Care Provider: Nissa Nolan MD (Tel: [phone])    Brief Course:    Dank Fleming is a 54 y.o. male presents to the emergency department via [facility] emergency medical services after his son found him unresponsive and possibly having a seizure. In ED, patient was actively hallucinating and noted to be tremulous. As per family, patient has been consuming alcohol on a regular basis in combination with cocaine. Patient was alert and oriented in ED and able to follow commands. Over the course of hospital stay, patient started getting more agitated and aggressive. Ativan as per [facility] protocol and higher doses last night did not help patient with agitation. He was started on phenobarbital IV scheduled dosing and transfer to ICU. CC: Delirium tremens  Subjective: Patient got very agitated and then transferred to ICU overnight. Placed on phenobarbital scheduled doses. No major events reported after that.   Reviewed interval ancillary notes    Current Medications  thiamine (B-1) 500 mg in sodium chloride 0.9 % 100 mL IVPB, Q24H  multivitamin 1 tablet, Daily  folic acid (FOLVITE) tablet 1 mg, Daily  chlordiazePOXIDE (LIBRIUM) capsule 50 mg, TID  LORazepam (ATIVAN) tablet 1 mg, Q1H PRN    Or  LORazepam (ATIVAN) injection 1 mg, Q1H PRN    Or  LORazepam (ATIVAN) tablet 2 mg, Q1H PRN    Or  LORazepam (ATIVAN) injection 2 mg, Q1H PRN    Or  LORazepam (ATIVAN) tablet 3 mg, Q1H PRN    Or  LORazepam (ATIVAN) injection 3 mg, Q1H PRN    Or  LORazepam (ATIVAN) tablet 4 mg, Q1H PRN    Or  LORazepam (ATIVAN) injection 4 mg, Q1H PRN  sodium chloride flush 0.9 % injection 10 mL, 2 times per day  sodium chloride flush 0.9 % injection 10 mL, PRN  promethazine (PHENERGAN) tablet 12.5 mg, Q6H PRN    Or  ondansetron 2.2* 2.2* 2.5*   ALKPHOS 49 41 40     CT Cervical Spine WO Contrast   Final Result   No acute abnormality of the cervical spine. CT Head WO Contrast   Final Result   No acute intracranial abnormality. Problem List  Active Problems:    Alcohol withdrawal seizure, uncomplicated (HCC)    Delirium tremens ([facility] [address]  Resolved Problems:    * No resolved hospital problems. *       Assessment & Plan:     #1. Delirium tremens:  Patient was initially placed on CIWA protocol. In order to control severe agitation, pt got total of 3 doses of phenobarbital over the last 24 hrs. Placed patient back on Ativan CIWA protocol. Patient is currently clinically stable. Maintaining airway. Continues to be confused and hallucinating. Not agitated. On restraints. #2. Hypokalemia:  Serum K of 3.4 on today a.m. lab. Patient is getting p.o. and IV replacement as per protocol. Placed on oral KCl 40 meq po qd. #3. Hypomagnesemia:  Serum sodium of 1.4 today morning. Patient is alert IV magnesium 1 g in the morning. Ordered for oral magnesium oxide 400 mg p.o. twice daily for daily replacement. #4.  Mild thrombocytopenia:  Platelet count of 664,562. Will follow up on am labs. #5.  Mild hyponatremia:  sr Na 134 today am. Likely from poor po intake. Monitor on am labs.      IV Access: Peripheral  Alcantara: None  Diet: Diet NPO Effective Now  Code:Full Code  DVT PPX Lovenox sq q daily  Disposition: pending acute issues    Gela Byrne MD   7/9/2020 3:24 PM

## 2020-07-09 NOTE — PROGRESS NOTES
Patient arrived to ICU room 4. Patient is confused, agitated, withdrawing from ETOH. Bilateral soft wrist restraints placed per order. Seizure precautions in place. Patient is confused, oriented to self only. States we are downtown in [place] to help the animals and the year is 5. He is aggressive at times kicking at the bed rails.

## 2020-07-09 NOTE — FLOWSHEET NOTE
0600 Pt transferred to ICU bed 4 with RN and PCA assist with ICU RN assist. Pt remains confused.  Telemetry brought back to 3tower monitor tech Katarzyna

## 2020-07-09 NOTE — PROGRESS NOTES
In bed pulled off all his clothes, tele and IV. Attempting to get out of bed. Continues to be agitated, disoriented and hallucinating. States, Maryjane Jacobs put two forks in me with turkey. \" Message sent to Hospitalist. Back in bed with tele on and IV restarted. Received order for Ativan 8 mg IVP x 1. Will continue to monitor.

## 2020-07-10 LAB
A/G RATIO: 1.1 (ref 1.1–2.2)
ALBUMIN SERPL-MCNC: 3.7 G/DL (ref 3.4–5)
ALP BLD-CCNC: 38 U/L (ref 40–129)
ALT SERPL-CCNC: 121 U/L (ref 10–40)
ANION GAP SERPL CALCULATED.3IONS-SCNC: 20 MMOL/L (ref 3–16)
ANION GAP SERPL CALCULATED.3IONS-SCNC: 23 MMOL/L (ref 3–16)
AST SERPL-CCNC: 99 U/L (ref 15–37)
BASE EXCESS VENOUS: -5 MMOL/L (ref -3–3)
BASOPHILS ABSOLUTE: 0.1 K/UL (ref 0–0.2)
BASOPHILS RELATIVE PERCENT: 1.3 %
BILIRUB SERPL-MCNC: 2 MG/DL (ref 0–1)
BUN BLDV-MCNC: 4 MG/DL (ref 7–20)
BUN BLDV-MCNC: 6 MG/DL (ref 7–20)
CALCIUM SERPL-MCNC: 8.5 MG/DL (ref 8.3–10.6)
CALCIUM SERPL-MCNC: 8.6 MG/DL (ref 8.3–10.6)
CARBOXYHEMOGLOBIN: 3.1 % (ref 0–1.5)
CHLORIDE BLD-SCNC: 93 MMOL/L (ref 99–110)
CHLORIDE BLD-SCNC: 95 MMOL/L (ref 99–110)
CO2: 18 MMOL/L (ref 21–32)
CO2: 20 MMOL/L (ref 21–32)
CREAT SERPL-MCNC: <0.5 MG/DL (ref 0.9–1.3)
CREAT SERPL-MCNC: <0.5 MG/DL (ref 0.9–1.3)
EOSINOPHILS ABSOLUTE: 0.1 K/UL (ref 0–0.6)
EOSINOPHILS RELATIVE PERCENT: 1.9 %
ESTIMATED AVERAGE GLUCOSE: 85.3 MG/DL
GFR AFRICAN AMERICAN: >60
GFR AFRICAN AMERICAN: >60
GFR NON-AFRICAN AMERICAN: >60
GFR NON-AFRICAN AMERICAN: >60
GLOBULIN: 3.3 G/DL
GLUCOSE BLD-MCNC: 73 MG/DL (ref 70–99)
GLUCOSE BLD-MCNC: 79 MG/DL (ref 70–99)
HBA1C MFR BLD: 4.6 %
HCO3 VENOUS: 18.1 MMOL/L (ref 23–29)
HCT VFR BLD CALC: 34.7 % (ref 40.5–52.5)
HEMOGLOBIN: 11.7 G/DL (ref 13.5–17.5)
LACTIC ACID: 0.8 MMOL/L (ref 0.4–2)
LYMPHOCYTES ABSOLUTE: 1.5 K/UL (ref 1–5.1)
LYMPHOCYTES RELATIVE PERCENT: 20.9 %
MAGNESIUM: 1.3 MG/DL (ref 1.8–2.4)
MAGNESIUM: 1.9 MG/DL (ref 1.8–2.4)
MCH RBC QN AUTO: 34.2 PG (ref 26–34)
MCHC RBC AUTO-ENTMCNC: 33.7 G/DL (ref 31–36)
MCV RBC AUTO: 101.4 FL (ref 80–100)
METHEMOGLOBIN VENOUS: 0.1 %
MONOCYTES ABSOLUTE: 1 K/UL (ref 0–1.3)
MONOCYTES RELATIVE PERCENT: 13.5 %
NEUTROPHILS ABSOLUTE: 4.6 K/UL (ref 1.7–7.7)
NEUTROPHILS RELATIVE PERCENT: 62.4 %
O2 CONTENT, VEN: 17 VOL %
O2 SAT, VEN: 100 %
O2 THERAPY: ABNORMAL
PCO2, VEN: 27.3 MMHG (ref 40–50)
PDW BLD-RTO: 13.1 % (ref 12.4–15.4)
PH VENOUS: 7.43 (ref 7.35–7.45)
PLATELET # BLD: 143 K/UL (ref 135–450)
PMV BLD AUTO: 8.7 FL (ref 5–10.5)
PO2, VEN: 208 MMHG (ref 25–40)
POTASSIUM REFLEX MAGNESIUM: 3.1 MMOL/L (ref 3.5–5.1)
POTASSIUM SERPL-SCNC: 3.4 MMOL/L (ref 3.5–5.1)
RBC # BLD: 3.42 M/UL (ref 4.2–5.9)
SODIUM BLD-SCNC: 134 MMOL/L (ref 136–145)
SODIUM BLD-SCNC: 135 MMOL/L (ref 136–145)
TCO2 CALC VENOUS: 42 MMOL/L
TOTAL PROTEIN: 7 G/DL (ref 6.4–8.2)
WBC # BLD: 7.3 K/UL (ref 4–11)

## 2020-07-10 PROCEDURE — 2000000000 HC ICU R&B

## 2020-07-10 PROCEDURE — 2580000003 HC RX 258: Performed by: INTERNAL MEDICINE

## 2020-07-10 PROCEDURE — 85025 COMPLETE CBC W/AUTO DIFF WBC: CPT

## 2020-07-10 PROCEDURE — 82803 BLOOD GASES ANY COMBINATION: CPT

## 2020-07-10 PROCEDURE — 83605 ASSAY OF LACTIC ACID: CPT

## 2020-07-10 PROCEDURE — 6360000002 HC RX W HCPCS: Performed by: INTERNAL MEDICINE

## 2020-07-10 PROCEDURE — 94760 N-INVAS EAR/PLS OXIMETRY 1: CPT

## 2020-07-10 PROCEDURE — 6370000000 HC RX 637 (ALT 250 FOR IP): Performed by: INTERNAL MEDICINE

## 2020-07-10 PROCEDURE — 80053 COMPREHEN METABOLIC PANEL: CPT

## 2020-07-10 PROCEDURE — 6360000002 HC RX W HCPCS

## 2020-07-10 PROCEDURE — 36415 COLL VENOUS BLD VENIPUNCTURE: CPT

## 2020-07-10 PROCEDURE — 83735 ASSAY OF MAGNESIUM: CPT

## 2020-07-10 RX ORDER — PHENOBARBITAL SODIUM 130 MG/ML
260 INJECTION INTRAMUSCULAR ONCE
Status: DISCONTINUED | OUTPATIENT
Start: 2020-07-10 | End: 2020-07-10 | Stop reason: CLARIF

## 2020-07-10 RX ORDER — PHENOBARBITAL SODIUM 130 MG/ML
260 INJECTION INTRAMUSCULAR ONCE
Status: COMPLETED | OUTPATIENT
Start: 2020-07-10 | End: 2020-07-10

## 2020-07-10 RX ORDER — DEXTROSE AND SODIUM CHLORIDE 5; .9 G/100ML; G/100ML
INJECTION, SOLUTION INTRAVENOUS CONTINUOUS
Status: DISCONTINUED | OUTPATIENT
Start: 2020-07-10 | End: 2020-07-14

## 2020-07-10 RX ORDER — PHENOBARBITAL SODIUM 130 MG/ML
130 INJECTION INTRAMUSCULAR ONCE
Status: COMPLETED | OUTPATIENT
Start: 2020-07-10 | End: 2020-07-10

## 2020-07-10 RX ORDER — MAGNESIUM SULFATE IN WATER 40 MG/ML
4 INJECTION, SOLUTION INTRAVENOUS ONCE
Status: COMPLETED | OUTPATIENT
Start: 2020-07-10 | End: 2020-07-10

## 2020-07-10 RX ORDER — PHENOBARBITAL SODIUM 130 MG/ML
INJECTION INTRAMUSCULAR
Status: COMPLETED
Start: 2020-07-10 | End: 2020-07-10

## 2020-07-10 RX ADMIN — LORAZEPAM 4 MG: 2 INJECTION INTRAMUSCULAR; INTRAVENOUS at 05:22

## 2020-07-10 RX ADMIN — FOLIC ACID 1 MG: 1 TABLET ORAL at 09:21

## 2020-07-10 RX ADMIN — LORAZEPAM 3 MG: 2 INJECTION INTRAMUSCULAR; INTRAVENOUS at 12:38

## 2020-07-10 RX ADMIN — CHLORDIAZEPOXIDE HYDROCHLORIDE 50 MG: 25 CAPSULE ORAL at 13:31

## 2020-07-10 RX ADMIN — POTASSIUM CHLORIDE 10 MEQ: 7.46 INJECTION, SOLUTION INTRAVENOUS at 22:11

## 2020-07-10 RX ADMIN — CHLORDIAZEPOXIDE HYDROCHLORIDE 50 MG: 25 CAPSULE ORAL at 09:21

## 2020-07-10 RX ADMIN — LORAZEPAM 3 MG: 2 INJECTION INTRAMUSCULAR; INTRAVENOUS at 18:45

## 2020-07-10 RX ADMIN — DEXTROSE AND SODIUM CHLORIDE: 5; 900 INJECTION, SOLUTION INTRAVENOUS at 10:31

## 2020-07-10 RX ADMIN — PHENOBARBITAL SODIUM 260 MG: 130 INJECTION INTRAMUSCULAR at 02:21

## 2020-07-10 RX ADMIN — POTASSIUM CHLORIDE 10 MEQ: 7.46 INJECTION, SOLUTION INTRAVENOUS at 12:37

## 2020-07-10 RX ADMIN — LORAZEPAM 3 MG: 2 INJECTION INTRAMUSCULAR; INTRAVENOUS at 07:43

## 2020-07-10 RX ADMIN — POTASSIUM CHLORIDE 10 MEQ: 7.46 INJECTION, SOLUTION INTRAVENOUS at 20:08

## 2020-07-10 RX ADMIN — Medication 10 ML: at 00:10

## 2020-07-10 RX ADMIN — DEXTROSE AND SODIUM CHLORIDE: 5; 900 INJECTION, SOLUTION INTRAVENOUS at 20:17

## 2020-07-10 RX ADMIN — POTASSIUM CHLORIDE 10 MEQ: 7.46 INJECTION, SOLUTION INTRAVENOUS at 10:35

## 2020-07-10 RX ADMIN — MAGNESIUM SULFATE HEPTAHYDRATE 4 G: 40 INJECTION, SOLUTION INTRAVENOUS at 09:26

## 2020-07-10 RX ADMIN — THERA TABS 1 TABLET: TAB at 09:21

## 2020-07-10 RX ADMIN — MAGNESIUM GLUCONATE 500 MG ORAL TABLET 400 MG: 500 TABLET ORAL at 09:21

## 2020-07-10 RX ADMIN — Medication 10 ML: at 01:15

## 2020-07-10 RX ADMIN — POTASSIUM CHLORIDE 10 MEQ: 7.46 INJECTION, SOLUTION INTRAVENOUS at 11:30

## 2020-07-10 RX ADMIN — LORAZEPAM 3 MG: 1 TABLET ORAL at 09:25

## 2020-07-10 RX ADMIN — ENOXAPARIN SODIUM 40 MG: 40 INJECTION SUBCUTANEOUS at 09:22

## 2020-07-10 RX ADMIN — LORAZEPAM 4 MG: 2 INJECTION INTRAMUSCULAR; INTRAVENOUS at 02:16

## 2020-07-10 RX ADMIN — POTASSIUM CHLORIDE 10 MEQ: 7.46 INJECTION, SOLUTION INTRAVENOUS at 21:08

## 2020-07-10 RX ADMIN — Medication 10 ML: at 21:45

## 2020-07-10 RX ADMIN — POTASSIUM CHLORIDE 10 MEQ: 7.46 INJECTION, SOLUTION INTRAVENOUS at 23:25

## 2020-07-10 RX ADMIN — LORAZEPAM 2 MG: 2 INJECTION INTRAMUSCULAR; INTRAVENOUS at 00:10

## 2020-07-10 RX ADMIN — LORAZEPAM 3 MG: 2 INJECTION INTRAMUSCULAR; INTRAVENOUS at 23:23

## 2020-07-10 RX ADMIN — PHENOBARBITAL SODIUM 130 MG: 130 INJECTION INTRAMUSCULAR at 03:36

## 2020-07-10 RX ADMIN — LORAZEPAM 2 MG: 2 INJECTION INTRAMUSCULAR; INTRAVENOUS at 22:22

## 2020-07-10 RX ADMIN — THIAMINE HYDROCHLORIDE 500 MG: 100 INJECTION, SOLUTION INTRAMUSCULAR; INTRAVENOUS at 08:48

## 2020-07-10 RX ADMIN — POTASSIUM CHLORIDE 10 MEQ: 7.46 INJECTION, SOLUTION INTRAVENOUS at 13:30

## 2020-07-10 RX ADMIN — SODIUM CHLORIDE: 9 INJECTION, SOLUTION INTRAVENOUS at 03:12

## 2020-07-10 RX ADMIN — POTASSIUM CHLORIDE 40 MEQ: 1500 TABLET, EXTENDED RELEASE ORAL at 09:26

## 2020-07-10 RX ADMIN — LORAZEPAM 3 MG: 2 INJECTION INTRAMUSCULAR; INTRAVENOUS at 01:15

## 2020-07-10 RX ADMIN — Medication 10 ML: at 09:23

## 2020-07-10 NOTE — PROGRESS NOTES
Received phenobarbital per order, see MAR. Reassessment complete, vitals obtained. Pt resting in bed eyes closed. Safety precautions in place. Restraints in place.

## 2020-07-10 NOTE — PROGRESS NOTES
Pt refused to have lab draw blood. Became somewhat combative when she tried to apply tournaquet. Will attempt to draw blood by nurse after pt calmer.

## 2020-07-10 NOTE — PROGRESS NOTES
[facility] PROGRESS NOTE    7/10/2020 12:28 PM        Name: Ross Hummel . Admitted: 7/7/2020  Primary Care Provider: Suzanna Kramer MD (Tel: [phone])    Brief Course:   Sandeep tomlinson 54 y. o. male presents to the emergency department via [facility] emergency medical services after his son found him unresponsive and possibly having a seizure. In ED, patient was actively hallucinating and noted to be tremulous. As per family, patient has been consuming alcohol on a regular basis in combination with cocaine. Patient was alert and oriented in ED and able to follow commands. Over the course of hospital stay, patient started getting more agitated and aggressive. Ativan as per CIWA protocol and higher doses on 7/8 pm did not help patient with agitation. He was started on phenobarbital IV scheduled dosing and transfer to ICU. Last night, became agitated again and got an iv dose of phenobarb.      CC: Delirium tremens  Subjective: Patient got very agitated last night and kicked a nurse. Got another dose of iv phenobarbital, in addition to Ativan as per CIWA protocol. Placed on restraints.      Reviewed interval ancillary notes  Current Medications  magnesium sulfate 4 g in 100 mL IVPB premix, Once  dextrose 5 % and 0.9 % sodium chloride infusion, Continuous  PHENobarbital (LUMINAL) 130 mg in sodium chloride 0.9 % 100 mL IVPB, Q3H PRN  thiamine (B-1) 500 mg in sodium chloride 0.9 % 100 mL IVPB, Q24H  multivitamin 1 tablet, Daily  folic acid (FOLVITE) tablet 1 mg, Daily  chlordiazePOXIDE (LIBRIUM) capsule 50 mg, TID  LORazepam (ATIVAN) tablet 1 mg, Q1H PRN    Or  LORazepam (ATIVAN) injection 1 mg, Q1H PRN    Or  LORazepam (ATIVAN) tablet 2 mg, Q1H PRN    Or  LORazepam (ATIVAN) injection 2 mg, Q1H PRN    Or  LORazepam (ATIVAN) tablet 3 mg, Q1H PRN    Or  LORazepam (ATIVAN) injection 3 mg, Q1H PRN Recent Labs     07/09/20  0511 07/09/20  1336 07/10/20  0417   * 132* 134*   K 3.4* 3.6  3.6 3.1*   CL 91* 90* 93*   CO2 20* 20* 18*   BUN 10 8 6*   CREATININE 0.6* <0.5* <0.5*   GLUCOSE 84 71 73     Hepatic:   Recent Labs     07/08/20  0800 07/09/20  0511 07/10/20  0417   * 137* 99*   * 152* 121*   BILITOT 2.2* 2.5* 2.0*   ALKPHOS 41 40 38*     CT Cervical Spine WO Contrast   Final Result   No acute abnormality of the cervical spine. CT Head WO Contrast   Final Result   No acute intracranial abnormality. Problem List  Active Problems:    Alcohol withdrawal seizure, uncomplicated (HCC)    Delirium tremens ([address]  Resolved Problems:    * No resolved hospital problems. *        Assessment & Plan:     Delirium tremens:  NPO. Seizure precautions. On CIWA protocol. Added IV phenobarbital for PRN use for severe agitation, especially overnight. Pt is receiving thiamine, folic acid supplementation. Placed on restraints. Sitter when he gets very agitated and harm to self. HAGMA:   Corrected Serum pH of 7.34. Serum bicarb of 18 in am, 20 on repeat BMP. Normal lactate level. Will monitor BMP and ABG in a.m. No indication for bicarb administration at this time.     Hypokalemia:  Serum K of 3.1. Patient is getting p.o. and IV replacement as per protocol. Placed on oral KCl 40 meq po qd.    Hypomagnesemia:  Serum Mg replaced with 1mg IV Mg. On Mg replacement protocol.      Mild thrombocytopenia:  Platelet count of 233,104. Will follow up on am labs.       Mild hyponatremia:  Likely from poor po intake. Monitor on am labs.  Getting D5+NS @ 100 ml/hr.      IV Access: Peripheral  Alcantara: None  Diet: Diet NPO Effective Now  Code:Full Code  DVT PPX Lovenox sq q daily  Disposition: pending acute issues    Saeed Cedillo MD   7/10/2020 12:28 PM

## 2020-07-10 NOTE — PROGRESS NOTES
Assessment complete, vitals obtained. Pt awake resting in bed. Oriented to self, month/year. Disoriented to place & situation. Calm and cooperative w/ care. Follows commands. Mild tremors to BUE noted. Pt denies anxiety, pain, SOB. On room air. Lungs clear. Ab flat / soft. + bowel sounds. Skin warm and dry. Few scattered bruises. Incontinent of urine, cleaned up. New brief / bed pad. BG checked - 68. MD messaged for hypoglycemia orders. 12.5 g dextrose given, see MAR. Pt returned to resting eyes closed, snoring. Will give PO meds when pt is a little more awake. Safety precautions in place. Bilateral wrist restraints in place. Dr. Horn

## 2020-07-10 NOTE — CARE COORDINATION
Discharge planning note:    Patient remains on ICU. CIWA is 16 and she is still in restraints. Consult regarding alcohol rehab will be completed when patient can particpate in conversation. Resources left at bedside for the weekend.     Chapo Juarez RN BSN  Case Management  [phone]

## 2020-07-10 NOTE — PROGRESS NOTES
Pt throwing legs over side rail. Extremely agitated upon trying to get legs back in bed. Pt thrashing legs, kicked staff member. Pulling on restraints. Code violet called. Pt medicated per BILL, see MAR  Dr Laurette Dance @ bedside.  New orders for phenobarbital

## 2020-07-11 LAB
A/G RATIO: 1 (ref 1.1–2.2)
ALBUMIN SERPL-MCNC: 3.5 G/DL (ref 3.4–5)
ALP BLD-CCNC: 38 U/L (ref 40–129)
ALT SERPL-CCNC: 118 U/L (ref 10–40)
ANION GAP SERPL CALCULATED.3IONS-SCNC: 17 MMOL/L (ref 3–16)
AST SERPL-CCNC: 85 U/L (ref 15–37)
BASOPHILS ABSOLUTE: 0 K/UL (ref 0–0.2)
BASOPHILS RELATIVE PERCENT: 0.8 %
BILIRUB SERPL-MCNC: 1.1 MG/DL (ref 0–1)
BUN BLDV-MCNC: 3 MG/DL (ref 7–20)
CALCIUM SERPL-MCNC: 8.7 MG/DL (ref 8.3–10.6)
CHLORIDE BLD-SCNC: 98 MMOL/L (ref 99–110)
CO2: 21 MMOL/L (ref 21–32)
CREAT SERPL-MCNC: <0.5 MG/DL (ref 0.9–1.3)
EOSINOPHILS ABSOLUTE: 0.1 K/UL (ref 0–0.6)
EOSINOPHILS RELATIVE PERCENT: 2.8 %
GFR AFRICAN AMERICAN: >60
GFR NON-AFRICAN AMERICAN: >60
GLOBULIN: 3.5 G/DL
GLUCOSE BLD-MCNC: 108 MG/DL (ref 70–99)
GLUCOSE BLD-MCNC: 83 MG/DL (ref 70–99)
HCT VFR BLD CALC: 37 % (ref 40.5–52.5)
HEMOGLOBIN: 12.5 G/DL (ref 13.5–17.5)
LYMPHOCYTES ABSOLUTE: 0.9 K/UL (ref 1–5.1)
LYMPHOCYTES RELATIVE PERCENT: 19.7 %
MAGNESIUM: 1.4 MG/DL (ref 1.8–2.4)
MCH RBC QN AUTO: 33.7 PG (ref 26–34)
MCHC RBC AUTO-ENTMCNC: 33.7 G/DL (ref 31–36)
MCV RBC AUTO: 99.9 FL (ref 80–100)
MONOCYTES ABSOLUTE: 0.8 K/UL (ref 0–1.3)
MONOCYTES RELATIVE PERCENT: 17 %
NEUTROPHILS ABSOLUTE: 2.8 K/UL (ref 1.7–7.7)
NEUTROPHILS RELATIVE PERCENT: 59.7 %
PDW BLD-RTO: 13.3 % (ref 12.4–15.4)
PERFORMED ON: NORMAL
PLATELET # BLD: 159 K/UL (ref 135–450)
PMV BLD AUTO: 8.1 FL (ref 5–10.5)
POTASSIUM REFLEX MAGNESIUM: 2.9 MMOL/L (ref 3.5–5.1)
POTASSIUM SERPL-SCNC: 3.6 MMOL/L (ref 3.5–5.1)
RBC # BLD: 3.71 M/UL (ref 4.2–5.9)
REASON FOR REJECTION: NORMAL
REJECTED TEST: NORMAL
SODIUM BLD-SCNC: 136 MMOL/L (ref 136–145)
TOTAL PROTEIN: 7 G/DL (ref 6.4–8.2)
WBC # BLD: 4.6 K/UL (ref 4–11)

## 2020-07-11 PROCEDURE — 6360000002 HC RX W HCPCS: Performed by: INTERNAL MEDICINE

## 2020-07-11 PROCEDURE — 84132 ASSAY OF SERUM POTASSIUM: CPT

## 2020-07-11 PROCEDURE — 6360000002 HC RX W HCPCS

## 2020-07-11 PROCEDURE — 80053 COMPREHEN METABOLIC PANEL: CPT

## 2020-07-11 PROCEDURE — 2580000003 HC RX 258: Performed by: INTERNAL MEDICINE

## 2020-07-11 PROCEDURE — 36415 COLL VENOUS BLD VENIPUNCTURE: CPT

## 2020-07-11 PROCEDURE — 6370000000 HC RX 637 (ALT 250 FOR IP): Performed by: INTERNAL MEDICINE

## 2020-07-11 PROCEDURE — 85025 COMPLETE CBC W/AUTO DIFF WBC: CPT

## 2020-07-11 PROCEDURE — 2000000000 HC ICU R&B

## 2020-07-11 PROCEDURE — 83735 ASSAY OF MAGNESIUM: CPT

## 2020-07-11 RX ORDER — 0.9 % SODIUM CHLORIDE 0.9 %
20 INTRAVENOUS SOLUTION INTRAVENOUS ONCE
Status: DISCONTINUED | OUTPATIENT
Start: 2020-07-11 | End: 2020-07-11

## 2020-07-11 RX ORDER — PHENOBARBITAL SODIUM 130 MG/ML
INJECTION INTRAMUSCULAR
Status: DISCONTINUED
Start: 2020-07-11 | End: 2020-07-11 | Stop reason: WASHOUT

## 2020-07-11 RX ORDER — PHENOBARBITAL SODIUM 130 MG/ML
260 INJECTION INTRAMUSCULAR ONCE
Status: COMPLETED | OUTPATIENT
Start: 2020-07-11 | End: 2020-07-11

## 2020-07-11 RX ORDER — PHENOBARBITAL SODIUM 130 MG/ML
INJECTION INTRAMUSCULAR
Status: COMPLETED
Start: 2020-07-11 | End: 2020-07-11

## 2020-07-11 RX ADMIN — LORAZEPAM 2 MG: 2 INJECTION INTRAMUSCULAR; INTRAVENOUS at 07:49

## 2020-07-11 RX ADMIN — POTASSIUM CHLORIDE 10 MEQ: 7.46 INJECTION, SOLUTION INTRAVENOUS at 06:27

## 2020-07-11 RX ADMIN — POTASSIUM CHLORIDE 10 MEQ: 7.46 INJECTION, SOLUTION INTRAVENOUS at 05:19

## 2020-07-11 RX ADMIN — POTASSIUM CHLORIDE 10 MEQ: 7.46 INJECTION, SOLUTION INTRAVENOUS at 08:40

## 2020-07-11 RX ADMIN — POTASSIUM CHLORIDE 10 MEQ: 7.46 INJECTION, SOLUTION INTRAVENOUS at 07:50

## 2020-07-11 RX ADMIN — MAGNESIUM GLUCONATE 500 MG ORAL TABLET 400 MG: 500 TABLET ORAL at 09:48

## 2020-07-11 RX ADMIN — THIAMINE HYDROCHLORIDE 500 MG: 100 INJECTION, SOLUTION INTRAMUSCULAR; INTRAVENOUS at 10:48

## 2020-07-11 RX ADMIN — ENOXAPARIN SODIUM 40 MG: 40 INJECTION SUBCUTANEOUS at 07:49

## 2020-07-11 RX ADMIN — POTASSIUM CHLORIDE 10 MEQ: 7.46 INJECTION, SOLUTION INTRAVENOUS at 09:48

## 2020-07-11 RX ADMIN — CHLORDIAZEPOXIDE HYDROCHLORIDE 50 MG: 25 CAPSULE ORAL at 21:42

## 2020-07-11 RX ADMIN — CHLORDIAZEPOXIDE HYDROCHLORIDE 50 MG: 25 CAPSULE ORAL at 09:48

## 2020-07-11 RX ADMIN — DEXTROSE AND SODIUM CHLORIDE: 5; 900 INJECTION, SOLUTION INTRAVENOUS at 18:48

## 2020-07-11 RX ADMIN — MAGNESIUM GLUCONATE 500 MG ORAL TABLET 400 MG: 500 TABLET ORAL at 21:43

## 2020-07-11 RX ADMIN — LORAZEPAM 2 MG: 2 INJECTION INTRAMUSCULAR; INTRAVENOUS at 05:03

## 2020-07-11 RX ADMIN — Medication 10 ML: at 09:49

## 2020-07-11 RX ADMIN — POTASSIUM CHLORIDE 40 MEQ: 1500 TABLET, EXTENDED RELEASE ORAL at 09:48

## 2020-07-11 RX ADMIN — FOLIC ACID 1 MG: 1 TABLET ORAL at 09:48

## 2020-07-11 RX ADMIN — POTASSIUM CHLORIDE 10 MEQ: 7.46 INJECTION, SOLUTION INTRAVENOUS at 10:48

## 2020-07-11 RX ADMIN — THERA TABS 1 TABLET: TAB at 09:48

## 2020-07-11 RX ADMIN — PHENOBARBITAL SODIUM 260 MG: 130 INJECTION INTRAMUSCULAR at 03:21

## 2020-07-11 RX ADMIN — MAGNESIUM SULFATE HEPTAHYDRATE 2 G: 40 INJECTION, SOLUTION INTRAVENOUS at 06:44

## 2020-07-11 RX ADMIN — DEXTROSE AND SODIUM CHLORIDE: 5; 900 INJECTION, SOLUTION INTRAVENOUS at 07:49

## 2020-07-11 RX ADMIN — LORAZEPAM 2 MG: 2 INJECTION INTRAMUSCULAR; INTRAVENOUS at 06:43

## 2020-07-11 RX ADMIN — Medication 10 ML: at 06:44

## 2020-07-11 ASSESSMENT — PAIN SCALES - GENERAL
PAINLEVEL_OUTOF10: 0

## 2020-07-11 NOTE — PROGRESS NOTES
Reassessment complete, vitals obtained. Pt medicated per CIWA- see MAR. incontinent of urine, brief changed. Skin cleansed. Pulled up in bed. Restraints remain in place. Safety precautions in place.

## 2020-07-11 NOTE — PROGRESS NOTES
[facility]    7/11/2020 8:29 AM        Name: Nicky Pinto . Admitted: 7/7/2020  Primary Care Provider: Ck Augustin MD (Tel: [phone])    Brief Course:    Glne Banks a 54 y. o. male presents to the emergency department via [place] emergency medical services after his son found him unresponsive and possibly having a [address] ED, patient was actively hallucinating and noted to be tremulous.  As per family, patient has been consuming alcohol on a regular basis in combination with cocaine.  Patient was alert and oriented in ED and able to follow commands.  Over the course of hospital stay, patient started getting more agitated and aggressive. Yetta Ross as per [facility] protocol and higher doses on 7/8 pm did not help patient with agitation.  He was started on phenobarbital IV scheduled dosing and transfer to ICU. Last night, became agitated again and got an iv dose of phenobarb.     [address]: Alcohol withdrawal  Subjective:  Pt got aggressive overnight, needing IVx1 phenobarb.  Resting well in am.   Reviewed interval ancillary notes    Current Medications  dextrose 5 % and 0.9 % sodium chloride infusion, Continuous  PHENobarbital (LUMINAL) 130 mg in sodium chloride 0.9 % 100 mL IVPB, Q3H PRN  thiamine (B-1) 500 mg in sodium chloride 0.9 % 100 mL IVPB, Q24H  multivitamin 1 tablet, Daily  folic acid (FOLVITE) tablet 1 mg, Daily  chlordiazePOXIDE (LIBRIUM) capsule 50 mg, TID  LORazepam (ATIVAN) tablet 1 mg, Q1H PRN    Or  LORazepam (ATIVAN) injection 1 mg, Q1H PRN    Or  LORazepam (ATIVAN) tablet 2 mg, Q1H PRN    Or  LORazepam (ATIVAN) injection 2 mg, Q1H PRN    Or  LORazepam (ATIVAN) tablet 3 mg, Q1H PRN    Or  LORazepam (ATIVAN) injection 3 mg, Q1H PRN    Or  LORazepam (ATIVAN) tablet 4 mg, Q1H PRN    Or  LORazepam (ATIVAN) injection 4 mg, Q1H PRN  magnesium oxide (MAG-OX) tablet 400 mg, BUN 6* 4* 3*   CREATININE <0.5* <0.5* <0.5*   GLUCOSE 73 79 108*     Hepatic:   Recent Labs     07/09/20  0511 07/10/20  0417 07/11/20  0434   * 99* 85*   * 121* 118*   BILITOT 2.5* 2.0* 1.1*   ALKPHOS 40 38* 38*     CT Cervical Spine WO Contrast   Final Result   No acute abnormality of the cervical spine. CT Head WO Contrast   Final Result   No acute intracranial abnormality. Problem List  Active Problems:    Alcohol withdrawal seizure, uncomplicated (HCC)    Delirium tremens ([facility] [address]  Resolved Problems:    * No resolved hospital problems. *       Assessment & Plan:     Replacing K and Mg aggressively. HAGMA- improving. Lactate level normal.   LFTs trending down. Platelets now in normal range. NPO due to AMS. Getting LR infusion. Delirium tremens:   Agitated overnight again. Needing phenobarb IV prn. NPO. Seizure precautions. On CIWA protocol and IV phenobarbital for PRN. IV thiamine, folic acid supplementation. Placed on restraints. Sitter when he gets very agitated and harm to self.      HAGMA:   Improving sr bicarb on BMP. Normal lactate level. Hypokalemia:  Serum K of 2.9. Patient is getting p.o. and IV replacement as per protocol.      Hypomagnesemia:  Serum Mg replaced with 2mg IV Mg. On Mg replacement protocol.      Mild thrombocytopenia:  Platelet count have normalized.  Will follow up on am labs.       Mild hyponatremia:  sr Na in normal range.       IV Access: Peripheral  Alcantara: None  Diet: NPO  Code: Full Code  DVT PPX Lovenox sq q daily  Disposition: pending acute issues    Mariza Key MD   7/11/2020 8:29 AM

## 2020-07-11 NOTE — PROGRESS NOTES
Pt incontinent. , agitated and aggressive towards staff when getting cleaned up. Attempting to hit and kick. Additional staff called to bedside. Dr Christal Curling called. Order for 260 mg phenobarbital IV push.   Order placed & med given, see MAR

## 2020-07-11 NOTE — PROGRESS NOTES
Assessment complete, vitals obtained. Pt resting in bed eyes closed. Oriented only to self. Follows commands at times. Speech mostly incomprehensible / mumbled. Pt denies pain, anxiety, or SOB. On room air, lungs clear. Ab flat / soft. Nontender. + bowel sounds. Skin warm and dry. Scattered bruises. Pulses palpable. Pt repositioning in bed. Bilateral wrist restraints in place. Safety precautions in place. Due PO meds held d/t delayed swallowing followed by cough. Unsure pt can safely swallow at this time. PRN potassium replacement started for k+ 3.4, see MAR.

## 2020-07-12 LAB
A/G RATIO: 1 (ref 1.1–2.2)
ALBUMIN SERPL-MCNC: 3.6 G/DL (ref 3.4–5)
ALP BLD-CCNC: 42 U/L (ref 40–129)
ALT SERPL-CCNC: 120 U/L (ref 10–40)
ANION GAP SERPL CALCULATED.3IONS-SCNC: 14 MMOL/L (ref 3–16)
AST SERPL-CCNC: 78 U/L (ref 15–37)
BASOPHILS ABSOLUTE: 0 K/UL (ref 0–0.2)
BASOPHILS RELATIVE PERCENT: 0.9 %
BILIRUB SERPL-MCNC: 0.7 MG/DL (ref 0–1)
BLOOD CULTURE, ROUTINE: NORMAL
BUN BLDV-MCNC: 3 MG/DL (ref 7–20)
CALCIUM SERPL-MCNC: 9.2 MG/DL (ref 8.3–10.6)
CHLORIDE BLD-SCNC: 99 MMOL/L (ref 99–110)
CO2: 21 MMOL/L (ref 21–32)
CREAT SERPL-MCNC: <0.5 MG/DL (ref 0.9–1.3)
CULTURE, BLOOD 2: NORMAL
EOSINOPHILS ABSOLUTE: 0.1 K/UL (ref 0–0.6)
EOSINOPHILS RELATIVE PERCENT: 2.8 %
GFR AFRICAN AMERICAN: >60
GFR NON-AFRICAN AMERICAN: >60
GLOBULIN: 3.5 G/DL
GLUCOSE BLD-MCNC: 111 MG/DL (ref 70–99)
GLUCOSE BLD-MCNC: 96 MG/DL (ref 70–99)
HCT VFR BLD CALC: 35.7 % (ref 40.5–52.5)
HEMOGLOBIN: 12 G/DL (ref 13.5–17.5)
LYMPHOCYTES ABSOLUTE: 1.3 K/UL (ref 1–5.1)
LYMPHOCYTES RELATIVE PERCENT: 25.1 %
MCH RBC QN AUTO: 33.7 PG (ref 26–34)
MCHC RBC AUTO-ENTMCNC: 33.5 G/DL (ref 31–36)
MCV RBC AUTO: 100.8 FL (ref 80–100)
MONOCYTES ABSOLUTE: 1 K/UL (ref 0–1.3)
MONOCYTES RELATIVE PERCENT: 19.7 %
NEUTROPHILS ABSOLUTE: 2.6 K/UL (ref 1.7–7.7)
NEUTROPHILS RELATIVE PERCENT: 51.5 %
PDW BLD-RTO: 12.9 % (ref 12.4–15.4)
PERFORMED ON: ABNORMAL
PLATELET # BLD: 222 K/UL (ref 135–450)
PMV BLD AUTO: 8.4 FL (ref 5–10.5)
POTASSIUM REFLEX MAGNESIUM: 3.8 MMOL/L (ref 3.5–5.1)
RBC # BLD: 3.55 M/UL (ref 4.2–5.9)
SODIUM BLD-SCNC: 134 MMOL/L (ref 136–145)
TOTAL PROTEIN: 7.1 G/DL (ref 6.4–8.2)
WBC # BLD: 5 K/UL (ref 4–11)

## 2020-07-12 PROCEDURE — 6360000002 HC RX W HCPCS: Performed by: INTERNAL MEDICINE

## 2020-07-12 PROCEDURE — 36415 COLL VENOUS BLD VENIPUNCTURE: CPT

## 2020-07-12 PROCEDURE — 97129 THER IVNTJ 1ST 15 MIN: CPT

## 2020-07-12 PROCEDURE — 92610 EVALUATE SWALLOWING FUNCTION: CPT

## 2020-07-12 PROCEDURE — 2000000000 HC ICU R&B

## 2020-07-12 PROCEDURE — 85025 COMPLETE CBC W/AUTO DIFF WBC: CPT

## 2020-07-12 PROCEDURE — 80053 COMPREHEN METABOLIC PANEL: CPT

## 2020-07-12 PROCEDURE — 6370000000 HC RX 637 (ALT 250 FOR IP): Performed by: INTERNAL MEDICINE

## 2020-07-12 PROCEDURE — 2580000003 HC RX 258: Performed by: INTERNAL MEDICINE

## 2020-07-12 RX ADMIN — POTASSIUM CHLORIDE 40 MEQ: 1500 TABLET, EXTENDED RELEASE ORAL at 19:44

## 2020-07-12 RX ADMIN — POTASSIUM CHLORIDE 40 MEQ: 1500 TABLET, EXTENDED RELEASE ORAL at 09:38

## 2020-07-12 RX ADMIN — CHLORDIAZEPOXIDE HYDROCHLORIDE 50 MG: 25 CAPSULE ORAL at 13:29

## 2020-07-12 RX ADMIN — LORAZEPAM 4 MG: 1 TABLET ORAL at 09:38

## 2020-07-12 RX ADMIN — THERA TABS 1 TABLET: TAB at 09:38

## 2020-07-12 RX ADMIN — LORAZEPAM 4 MG: 2 INJECTION INTRAMUSCULAR; INTRAVENOUS at 23:37

## 2020-07-12 RX ADMIN — CHLORDIAZEPOXIDE HYDROCHLORIDE 50 MG: 25 CAPSULE ORAL at 22:07

## 2020-07-12 RX ADMIN — FOLIC ACID 1 MG: 1 TABLET ORAL at 09:39

## 2020-07-12 RX ADMIN — THIAMINE HYDROCHLORIDE 500 MG: 100 INJECTION, SOLUTION INTRAMUSCULAR; INTRAVENOUS at 09:43

## 2020-07-12 RX ADMIN — CHLORDIAZEPOXIDE HYDROCHLORIDE 50 MG: 25 CAPSULE ORAL at 09:38

## 2020-07-12 RX ADMIN — MAGNESIUM GLUCONATE 500 MG ORAL TABLET 400 MG: 500 TABLET ORAL at 13:29

## 2020-07-12 RX ADMIN — Medication 10 ML: at 20:45

## 2020-07-12 RX ADMIN — LORAZEPAM 2 MG: 2 INJECTION INTRAMUSCULAR; INTRAVENOUS at 22:14

## 2020-07-12 RX ADMIN — LORAZEPAM 2 MG: 2 INJECTION INTRAMUSCULAR; INTRAVENOUS at 20:45

## 2020-07-12 RX ADMIN — LORAZEPAM 4 MG: 2 INJECTION INTRAMUSCULAR; INTRAVENOUS at 05:20

## 2020-07-12 NOTE — PROGRESS NOTES
Patient slid down in bed, removed all tele leads and pulse ox. Repositioned in bed, leads replaced at this time.

## 2020-07-12 NOTE — PROGRESS NOTES
additional GI or  complaints. When asked how he got the small bump on the front of his right forehead he was unaware that this was present. Shortly after the patient's arrival to the emergency department his mother who is someone who contacted emergency medical services was here in the emergency department. She confirms to me that she heard a thud. She states that she went from the living room down to the basement where the patient was. She states when she got to the basement he was having tonic-clonic seizure activity and she thinks that this lasted in excess of 2 or 3 minutes. Despite the fact that it is reported the patient takes Depakote for seizure related disorder she states that is not the case because he has insurance issues and has not had seizure medication. She tells me he is also not followed up on an outpatient basis since the last time he had alcohol withdrawal seizure back in November. · CIWA precautions  · 7/12/2020 Agitated overnight;bilateral wrist restraints;  and did get sedating medication    · 7/7/2020  CT Cervical spine   Impression    No acute intracranial abnormality. · 7/7/2020 CT head  FINDINGS:    BRAIN/VENTRICLES: There is no acute intracranial hemorrhage, mass effect or    midline shift.  No abnormal extra-axial fluid collection.  The gray-white    differentiation is maintained without evidence of an acute infarct.  There is    no evidence of hydrocephalus. Multiple bilateral basal ganglia lacunar    infarctions. · Current diet npo; though RN reported gave some puree. Reason for eval: MD order for assessment for po    Assessment Impressions:  1. Pt asleep in bed upon SLP entry. Lethargy impacting pt's ability for sustained attention and active participation. Max external cues/stimuli for sustained attention to follow one step commands. Pt is oriented to self ; \"hospital\"; president; month and year.  Pt is verbally responsive with sustained external cues for to answer questions  · Pre-feeding oral exam revealed general weakness; weak volitional cough; delayed volitional swallow; absent oral reflexes  · Pt remains in bilateral wrist restraints    2. Oropharyngeal Dysphagia characterized by reduced lingual bolus control; tongue thrust with all thin and thick liquid presentations; lingual mashing oral transit of puree; holding with reduced mastication of dysphagia minced and moist food consistencies; delayed swallow and potential reduced laryngeal elevation  · Tongue thrust with swallow and overt clinical s/s with concern for aspiration post swallow of thin and mildly thick (nectar thick ) liquids (coughing)  · Variable holding and lingual mashing A-P oral transit ; delayed swallow for moderately thick (honey thick) and puree. No overt clinical s/s post swallow  · Holding and reduced mastication of dysphagia minced and moist with oral pocketing. Pt did not clear on request or instructions for lingual sweep. SLP removed via oral care  · Testing discontinued due to lethargy    3. Lethargy is exacerbating deficits    4. Recommend npo with staff assisted po tsp presentations of moderately thick and puree when pt is asking for something and more alert. Recommend ongoing re-assessment 2020 with hopes of improved mental status and sustained arousal    Dietary Recommendations:  NPO except tsp presentations of moderately thick  Or puree with max supervision when pt is alert and responsive    Strategies: Oral care; staff assisted trials only when alert and responsive and in an upright position. Oral care after all po. Additional suggestions after re-eval    Treatment/Goals: Speech therapy for dysphagia tx 3-5 times per week.      ST.) Pt will tolerate ongoing assessment for po diet readiness  2.) pt will tolerated staff assisted tsp presentation of moderately thick (honey ) and puree without s/s of penetration/aspiration    Oral motor Exam:  Dentition:

## 2020-07-12 NOTE — PROGRESS NOTES
Reassessment complete. See flowsheet. Patient increasingly more aggressive this evening. Bilateral soft wrist restraints continue. Patient drowsy, oriented to person, and time only. Incontinent of urine. Bed pad and diaper changed, laura care provided and repositioned in bed. Will continue to monitor.

## 2020-07-12 NOTE — PROGRESS NOTES
Assessment complete. See flow sheet. Pain evaluation complete. No complaints of pain at this time. Discussed POC for this shift. Patient is drowsy, but answers questions appropriately. Oriented to person, situation and time, disoriented to place. CIWA currently 1. Bilateral soft wrist restraints in place d/t intermittent agitation and confusion d/t ETOH withdrawal. He is incontinent of stool and urine. Bed pad and diaper changed, laura care provided and repositioned in bed. Patient encouraged to use call light with any needs. Patient states understanding, call light in reach, bed alarm on. Will continue to monitor.

## 2020-07-12 NOTE — PROGRESS NOTES
[facility]    7/12/2020 1:24 PM        Name: Alejandrina Drew . Admitted: 7/7/2020  Primary Care Provider: Maria De Jesus Jarvis MD (Tel: [phone])    Brief Course:  Rock Jos tomlinson 54 y. o. male presents to the emergency department via [facility] emergency medical services after his son found him unresponsive and possibly having a [address] ED, patient was actively hallucinating and noted to be tremulous.  As per family, patient has been consuming alcohol on a regular basis in combination with cocaine.  Patient was alert and oriented in ED and able to follow commands.  Over the course of hospital stay, patient started getting more agitated and aggressive. [place] as per CIWA protocol and higher doses on 7/8 pm did not help patient with agitation.  He was started on phenobarbital IV scheduled dosing and transfer to [address] [address] ICU, pt continues to be agitated needing Ativan and IV phenobarb.       CC: Alcohol withdrawal  Subjective:  Pt got agitated overnight. Calm and sleeping during my visit.    Reviewed interval ancillary notes    Current Medications  dextrose 5 % and 0.9 % sodium chloride infusion, Continuous  PHENobarbital (LUMINAL) 130 mg in sodium chloride 0.9 % 100 mL IVPB, Q3H PRN  thiamine (B-1) 500 mg in sodium chloride 0.9 % 100 mL IVPB, Q24H  multivitamin 1 tablet, Daily  folic acid (FOLVITE) tablet 1 mg, Daily  chlordiazePOXIDE (LIBRIUM) capsule 50 mg, TID  LORazepam (ATIVAN) tablet 1 mg, Q1H PRN    Or  LORazepam (ATIVAN) injection 1 mg, Q1H PRN    Or  LORazepam (ATIVAN) tablet 2 mg, Q1H PRN    Or  LORazepam (ATIVAN) injection 2 mg, Q1H PRN    Or  LORazepam (ATIVAN) tablet 3 mg, Q1H PRN    Or  LORazepam (ATIVAN) injection 3 mg, Q1H PRN    Or  LORazepam (ATIVAN) tablet 4 mg, Q1H PRN    Or  LORazepam (ATIVAN) injection 4 mg, Q1H PRN  magnesium oxide (MAG-OX) tablet 400 mg, BID  potassium chloride (KLOR-CON M) extended release tablet 40 mEq, BID WC  glucose (GLUTOSE) 40 % oral gel 15 g, PRN  dextrose 50 % IV solution, PRN  glucagon (rDNA) injection 1 mg, PRN  dextrose 5 % solution, PRN  sodium chloride flush 0.9 % injection 10 mL, 2 times per day  sodium chloride flush 0.9 % injection 10 mL, PRN  promethazine (PHENERGAN) tablet 12.5 mg, Q6H PRN    Or  ondansetron (ZOFRAN) injection 4 mg, Q6H PRN  enoxaparin (LOVENOX) injection 40 mg, Daily  potassium chloride 10 mEq/100 mL IVPB (Peripheral Line), PRN  magnesium sulfate 2 g in 50 mL IVPB premix, PRN        Objective:  BP (!) 146/89   Pulse 69   Temp 97.1 °F (36.2 °C) (Temporal)   Resp 15   Ht 5' 9\" (1.753 m)   Wt 153 lb 10.6 oz (69.7 kg)   SpO2 100%   BMI 22.69 kg/m²     Intake/Output Summary (Last 24 hours) at 7/12/2020 1324  Last data filed at 7/11/2020 1445  Gross per 24 hour   Intake 1685 ml   Output --   Net 1685 ml      Wt Readings from Last 3 Encounters:   07/11/20 153 lb 10.6 oz (69.7 kg)   08/01/19 151 lb 1.6 oz (68.5 kg)   12/15/18 149 lb 14.6 oz (68 kg)       General appearance: White male, sleeping, arousable, confused upon waking up  Cardiovascular: Regular rhythm, normal S1, S2. No murmur. No edema in lower extremities  Respiratory: Not using accessory muscles. Good inspiratory effort. Clear to auscultation bilaterally, no wheeze or crackles.    GI: Abdomen soft, no tenderness, not distended, normal bowel sounds  Musculoskeletal: No cyanosis in digits, neck supple  Skin: Warm, dry, normal turgor  Extremity exam shows brisk capillary refill.  Peripheral pulses are palpable in lower extremities       Labs and Tests:  CBC:   Recent Labs     07/10/20  0951 07/11/20  0434 07/12/20  0828   WBC 7.3 4.6 5.0   HGB 11.7* 12.5* 12.0*    159 222     BMP:    Recent Labs     07/10/20  1730 07/11/20  0434 07/11/20  1258 07/12/20  0828   * 136  --  134*   K 3.4* 2.9* 3.6 3.8   CL 95* 98*  --  99   CO2 20* 21  --  21   BUN 4* 3*  -- 3*   CREATININE <0.5* <0.5*  --  <0.5*   GLUCOSE 79 108*  --  96     Hepatic:   Recent Labs     07/10/20  0417 07/11/20  0434 07/12/20  0828   AST 99* 85* 78*   * 118* 120*   BILITOT 2.0* 1.1* 0.7   ALKPHOS 38* 38* 42     CT Cervical Spine WO Contrast   Final Result   No acute abnormality of the cervical spine. CT Head WO Contrast   Final Result   No acute intracranial abnormality. Problem List  Active Problems:    Alcohol withdrawal seizure, uncomplicated (HCC)    Delirium tremens ([facility] [address]  Resolved Problems:    * No resolved hospital problems. *       Assessment & Plan:      Delirium tremens:   Agitated overnight again. NPO - asked for speech and swallow eval as RN notified of pt being more awake today at times. Seizure precautions. On CIWA protocol and IV phenobarbital for PRN. IV thiamine, folic acid supplementation. Placed on restraints.     HAGMA:   Improving. Normal lactate level.      Hypokalemia:  on IV replacement as per protocol.      Hypomagnesemia:  Serum Mg replaced yesterday. On Mg replacement protocol.      Mild thrombocytopenia:  Platelet count have normalized. Will follow up on am labs.       Mild hyponatremia:  sr Na in normal range.      IV Access: Peripheral  Alcantara: None  Diet: NPO, speech and swallow eval ordered.   Code: Full Code  DVT PPX Lovenox sq q daily  Disposition: pending acute issues, can transfer to floor if not needing phenobarb anymore    Dolores Patel MD   7/12/2020 1:24 PM

## 2020-07-13 LAB
A/G RATIO: 0.9 (ref 1.1–2.2)
ALBUMIN SERPL-MCNC: 3.7 G/DL (ref 3.4–5)
ALP BLD-CCNC: 48 U/L (ref 40–129)
ALT SERPL-CCNC: 127 U/L (ref 10–40)
ANION GAP SERPL CALCULATED.3IONS-SCNC: 14 MMOL/L (ref 3–16)
AST SERPL-CCNC: 86 U/L (ref 15–37)
BASOPHILS ABSOLUTE: 0 K/UL (ref 0–0.2)
BASOPHILS RELATIVE PERCENT: 0.6 %
BILIRUB SERPL-MCNC: 0.6 MG/DL (ref 0–1)
BUN BLDV-MCNC: <2 MG/DL (ref 7–20)
CALCIUM SERPL-MCNC: 9.7 MG/DL (ref 8.3–10.6)
CHLORIDE BLD-SCNC: 99 MMOL/L (ref 99–110)
CO2: 22 MMOL/L (ref 21–32)
CREAT SERPL-MCNC: <0.5 MG/DL (ref 0.9–1.3)
EOSINOPHILS ABSOLUTE: 0.1 K/UL (ref 0–0.6)
EOSINOPHILS RELATIVE PERCENT: 1.5 %
GFR AFRICAN AMERICAN: >60
GFR NON-AFRICAN AMERICAN: >60
GLOBULIN: 3.9 G/DL
GLUCOSE BLD-MCNC: 100 MG/DL (ref 70–99)
HCT VFR BLD CALC: 38.4 % (ref 40.5–52.5)
HEMOGLOBIN: 13.2 G/DL (ref 13.5–17.5)
LYMPHOCYTES ABSOLUTE: 1.2 K/UL (ref 1–5.1)
LYMPHOCYTES RELATIVE PERCENT: 21.6 %
MCH RBC QN AUTO: 35.2 PG (ref 26–34)
MCHC RBC AUTO-ENTMCNC: 34.4 G/DL (ref 31–36)
MCV RBC AUTO: 102.3 FL (ref 80–100)
MONOCYTES ABSOLUTE: 1.3 K/UL (ref 0–1.3)
MONOCYTES RELATIVE PERCENT: 23.4 %
NEUTROPHILS ABSOLUTE: 3 K/UL (ref 1.7–7.7)
NEUTROPHILS RELATIVE PERCENT: 52.9 %
PDW BLD-RTO: 13.3 % (ref 12.4–15.4)
PLATELET # BLD: 233 K/UL (ref 135–450)
PMV BLD AUTO: 7.9 FL (ref 5–10.5)
POTASSIUM REFLEX MAGNESIUM: 4.2 MMOL/L (ref 3.5–5.1)
RBC # BLD: 3.75 M/UL (ref 4.2–5.9)
SODIUM BLD-SCNC: 135 MMOL/L (ref 136–145)
TOTAL PROTEIN: 7.6 G/DL (ref 6.4–8.2)
WBC # BLD: 5.7 K/UL (ref 4–11)

## 2020-07-13 PROCEDURE — 36415 COLL VENOUS BLD VENIPUNCTURE: CPT

## 2020-07-13 PROCEDURE — 80053 COMPREHEN METABOLIC PANEL: CPT

## 2020-07-13 PROCEDURE — 6360000002 HC RX W HCPCS: Performed by: INTERNAL MEDICINE

## 2020-07-13 PROCEDURE — 2580000003 HC RX 258: Performed by: INTERNAL MEDICINE

## 2020-07-13 PROCEDURE — 6370000000 HC RX 637 (ALT 250 FOR IP): Performed by: INTERNAL MEDICINE

## 2020-07-13 PROCEDURE — 2000000000 HC ICU R&B

## 2020-07-13 PROCEDURE — 85025 COMPLETE CBC W/AUTO DIFF WBC: CPT

## 2020-07-13 RX ADMIN — Medication 10 ML: at 08:37

## 2020-07-13 RX ADMIN — MAGNESIUM GLUCONATE 500 MG ORAL TABLET 400 MG: 500 TABLET ORAL at 08:37

## 2020-07-13 RX ADMIN — MAGNESIUM GLUCONATE 500 MG ORAL TABLET 400 MG: 500 TABLET ORAL at 21:31

## 2020-07-13 RX ADMIN — POTASSIUM CHLORIDE 40 MEQ: 1500 TABLET, EXTENDED RELEASE ORAL at 18:37

## 2020-07-13 RX ADMIN — LORAZEPAM 4 MG: 2 INJECTION INTRAMUSCULAR; INTRAVENOUS at 06:51

## 2020-07-13 RX ADMIN — LORAZEPAM 4 MG: 2 INJECTION INTRAMUSCULAR; INTRAVENOUS at 03:33

## 2020-07-13 RX ADMIN — LORAZEPAM 4 MG: 2 INJECTION INTRAMUSCULAR; INTRAVENOUS at 21:22

## 2020-07-13 RX ADMIN — CHLORDIAZEPOXIDE HYDROCHLORIDE 50 MG: 25 CAPSULE ORAL at 08:36

## 2020-07-13 RX ADMIN — CHLORDIAZEPOXIDE HYDROCHLORIDE 50 MG: 25 CAPSULE ORAL at 18:36

## 2020-07-13 RX ADMIN — FOLIC ACID 1 MG: 1 TABLET ORAL at 08:36

## 2020-07-13 RX ADMIN — LORAZEPAM 4 MG: 2 INJECTION INTRAMUSCULAR; INTRAVENOUS at 01:24

## 2020-07-13 RX ADMIN — ENOXAPARIN SODIUM 40 MG: 40 INJECTION SUBCUTANEOUS at 08:36

## 2020-07-13 RX ADMIN — LORAZEPAM 4 MG: 2 INJECTION INTRAMUSCULAR; INTRAVENOUS at 05:04

## 2020-07-13 RX ADMIN — THIAMINE HYDROCHLORIDE 500 MG: 100 INJECTION, SOLUTION INTRAMUSCULAR; INTRAVENOUS at 10:23

## 2020-07-13 RX ADMIN — CHLORDIAZEPOXIDE HYDROCHLORIDE 50 MG: 25 CAPSULE ORAL at 21:31

## 2020-07-13 RX ADMIN — POTASSIUM CHLORIDE 40 MEQ: 1500 TABLET, EXTENDED RELEASE ORAL at 08:36

## 2020-07-13 RX ADMIN — THERA TABS 1 TABLET: TAB at 08:36

## 2020-07-13 RX ADMIN — Medication 10 ML: at 21:00

## 2020-07-13 ASSESSMENT — PAIN SCALES - GENERAL: PAINLEVEL_OUTOF10: 0

## 2020-07-13 NOTE — PROGRESS NOTES
[facility]    7/13/2020 12:35 PM        Name: Rebecca Inman . Admitted: 7/7/2020  Primary Care Provider: Sparkle Cameron MD (Tel: [phone])    Brief Course:  Soto Johnson a 54 y. o. male presents to the emergency department via [facility] emergency medical services after his son found him unresponsive and possibly having a [address] ED, patient was actively hallucinating and noted to be tremulous.  As per family, patient has been consuming alcohol on a regular basis in combination with cocaine.  Patient was alert and oriented in ED and able to follow commands.  Over the course of hospital stay, patient started getting more agitated and aggressive. Osvaldo Spurr as per [facility] protocol and higher doses on 7/8 pm did not help patient with agitation.  He was started on phenobarbital IV scheduled dosing and transfer to [address] [address] ICU, pt continues to be agitated needing Ativan and IV phenobarb.       CC: Alcohol withdrawal  Subjective:  Pt got agitated overnight. Calm and sleeping during my visit.    Reviewed interval ancillary notes    Current Medications  dextrose 5 % and 0.9 % sodium chloride infusion, Continuous  thiamine (B-1) 500 mg in sodium chloride 0.9 % 100 mL IVPB, Q24H  multivitamin 1 tablet, Daily  folic acid (FOLVITE) tablet 1 mg, Daily  chlordiazePOXIDE (LIBRIUM) capsule 50 mg, TID  LORazepam (ATIVAN) tablet 1 mg, Q1H PRN    Or  LORazepam (ATIVAN) injection 1 mg, Q1H PRN    Or  LORazepam (ATIVAN) tablet 2 mg, Q1H PRN    Or  LORazepam (ATIVAN) injection 2 mg, Q1H PRN    Or  LORazepam (ATIVAN) tablet 3 mg, Q1H PRN    Or  LORazepam (ATIVAN) injection 3 mg, Q1H PRN    Or  LORazepam (ATIVAN) tablet 4 mg, Q1H PRN    Or  LORazepam (ATIVAN) injection 4 mg, Q1H PRN  magnesium oxide (MAG-OX) tablet 400 mg, BID  potassium chloride (KLOR-CON M) extended release tablet 40 mEq, BID WC  glucose (GLUTOSE) 40 % oral gel 15 g, PRN  dextrose 50 % IV solution, PRN  glucagon (rDNA) injection 1 mg, PRN  dextrose 5 % solution, PRN  sodium chloride flush 0.9 % injection 10 mL, 2 times per day  sodium chloride flush 0.9 % injection 10 mL, PRN  promethazine (PHENERGAN) tablet 12.5 mg, Q6H PRN    Or  ondansetron (ZOFRAN) injection 4 mg, Q6H PRN  enoxaparin (LOVENOX) injection 40 mg, Daily  potassium chloride 10 mEq/100 mL IVPB (Peripheral Line), PRN  magnesium sulfate 2 g in 50 mL IVPB premix, PRN        Objective:  BP (!) 147/100   Pulse 92   Temp 98 °F (36.7 °C) (Temporal)   Resp 11   Ht 5' 9\" (1.753 m)   Wt 153 lb (69.4 kg)   SpO2 99%   BMI 22.59 kg/m²   No intake or output data in the 24 hours ending 07/13/20 1235   Wt Readings from Last 3 Encounters:   07/13/20 153 lb (69.4 kg)   08/01/19 151 lb 1.6 oz (68.5 kg)   12/15/18 149 lb 14.6 oz (68 kg)       General appearance: White male, sleeping, arousable, confused upon waking up  Cardiovascular: Regular rhythm, normal S1, S2. No murmur. No edema in lower extremities  Respiratory: Not using accessory muscles. Good inspiratory effort. Clear to auscultation bilaterally, no wheeze or crackles.    GI: Abdomen soft, no tenderness, not distended, normal bowel sounds  Musculoskeletal: No cyanosis in digits, neck supple  Skin: Warm, dry, normal turgor  Extremity exam shows brisk capillary refill.  Peripheral pulses are palpable in lower extremities       Labs and Tests:  CBC:   Recent Labs     07/11/20  0434 07/12/20  0828 07/13/20  0430   WBC 4.6 5.0 5.7   HGB 12.5* 12.0* 13.2*    222 233     BMP:    Recent Labs     07/11/20  0434 07/11/20  1258 07/12/20  0828 07/13/20  0430     --  134* 135*   K 2.9* 3.6 3.8 4.2   CL 98*  --  99 99   CO2 21  --  21 22   BUN 3*  --  3* <2*   CREATININE <0.5*  --  <0.5* <0.5*   GLUCOSE 108*  --  96 100*     Hepatic:   Recent Labs     07/11/20  0434 07/12/20  0828 07/13/20  0430   AST 85* 78* 86*   * 120* 127* BILITOT 1.1* 0.7 0.6   ALKPHOS 38* 42 48     CT Cervical Spine WO Contrast   Final Result   No acute abnormality of the cervical spine. CT Head WO Contrast   Final Result   No acute intracranial abnormality. Problem List  Active Problems:    Alcohol withdrawal seizure, uncomplicated (HCC)    Delirium tremens ([address]  Resolved Problems:    * No resolved hospital problems. *       Assessment & Plan:      Delirium tremens:   -Still remains confused.  -Restraint in place  -Continue CIWA protocol. -IV thiamine and folic acid.       HAGMA:   Improving. Normal lactate level.      Hypokalemia:  on IV replacement as per protocol.      Hypomagnesemia:  Serum Mg replaced yesterday. On Mg replacement protocol.      Mild thrombocytopenia:  Platelet count have normalized. Will follow up on am labs.       Mild hyponatremia:  sr Na in normal range.      IV Access: Peripheral  Alcantara: None  Diet: NPO, speech and swallow eval ordered.   Code: Full Code  DVT PPX Lovenox sq q daily  Disposition: pending acute issues,     Uma Handy MD   7/13/2020 12:35 PM

## 2020-07-13 NOTE — PROGRESS NOTES
Phlebitis noted in L forearm. Infusion of normal saline stopped and IV catheter removed. New IV started in [facility].

## 2020-07-13 NOTE — PROGRESS NOTES
Patient remains uncooperative with care. Refuses to take PO medications or interact in a safe manor towards nursing staff. Restraints remain in place. Medicated per [facility] protocol. Will continue to monitor.

## 2020-07-13 NOTE — PROGRESS NOTES
Comprehensive Nutrition Assessment    Type and Reason for Visit:  Initial, NPO/Clear Liquid    Nutrition Recommendations/Plan:   Advance nutrition within next 24-48 hours     Nutrition Assessment:  Pt is nutritionally compromised AEB NPO status x 5 days of admission. Prior to admission, pt was using cocaine with ETOH. Wt is stable per hx. Pt is in CIWA precautions & very confused. Will monitor for ability to start nutrition : po diet vs aggressive nutrition support. Malnutrition Assessment:  Malnutrition Status:  Mild malnutrition    Context:  Acute Illness     Findings of the 6 clinical characteristics of malnutrition:  Energy Intake:  7 - 50% or less of estimated energy requirements for 5 or more days  Weight Loss:  No significant weight loss     Body Fat Loss:  Unable to assess     Muscle Mass Loss:  Unable to assess    Fluid Accumulation:  No significant fluid accumulation     Strength:  Not Performed    Estimated Daily Nutrient Needs:  Energy (kcal):  3508- 2070; Weight Used for Energy Requirements:  Current(69 kg)     Protein (g):  83- 104 g(1.2-1.5); Weight Used for Protein Requirements:  Current        Fluid (ml/day):  1 ml/kcal; Weight Used for Fluid Requirements:  Current      Nutrition Related Findings:  No edema noted; Active BS      Wounds:  None       Current Nutrition Therapies:    Diet NPO Effective Now    Anthropometric Measures:  · Height: 5' 9\" (175.3 cm)  · Current Body Weight: 153 lb (69.4 kg)  · Ideal Body Weight: 160 lbs; 95.6 lbs   · BMI: 22.6  · BMI Categories: Normal Weight (BMI 18.5-24. 9)       Nutrition Diagnosis:   · Inadequate energy intake related to (inadequate oral intake) as evidenced by NPO or clear liquid status due to medical condition      Nutrition Interventions:   Food and/or Nutrient Delivery:  Continue NPO  Nutrition Education/Counseling:  Education not indicated   Coordination of Nutrition Care:  Swallow Evaluation    Goals:  advance nutrition per SLP vs start nutrition support       Electronically signed by Dorothy Li RD, LD on 7/13/20 at 12:38 PM EDT    Contact: [phone]

## 2020-07-14 LAB
A/G RATIO: 1 (ref 1.1–2.2)
ALBUMIN SERPL-MCNC: 3.9 G/DL (ref 3.4–5)
ALP BLD-CCNC: 53 U/L (ref 40–129)
ALT SERPL-CCNC: 134 U/L (ref 10–40)
ANION GAP SERPL CALCULATED.3IONS-SCNC: 13 MMOL/L (ref 3–16)
ANISOCYTOSIS: ABNORMAL
AST SERPL-CCNC: 74 U/L (ref 15–37)
ATYPICAL LYMPHOCYTE RELATIVE PERCENT: 1 % (ref 0–6)
BANDED NEUTROPHILS RELATIVE PERCENT: 4 % (ref 0–7)
BASOPHILS ABSOLUTE: 0 K/UL (ref 0–0.2)
BASOPHILS RELATIVE PERCENT: 0 %
BILIRUB SERPL-MCNC: 0.4 MG/DL (ref 0–1)
BUN BLDV-MCNC: 5 MG/DL (ref 7–20)
CALCIUM SERPL-MCNC: 10 MG/DL (ref 8.3–10.6)
CHLORIDE BLD-SCNC: 99 MMOL/L (ref 99–110)
CO2: 23 MMOL/L (ref 21–32)
CREAT SERPL-MCNC: 0.7 MG/DL (ref 0.9–1.3)
EOSINOPHILS ABSOLUTE: 0.1 K/UL (ref 0–0.6)
EOSINOPHILS RELATIVE PERCENT: 1 %
GFR AFRICAN AMERICAN: >60
GFR NON-AFRICAN AMERICAN: >60
GLOBULIN: 3.8 G/DL
GLUCOSE BLD-MCNC: 100 MG/DL (ref 70–99)
GLUCOSE BLD-MCNC: 101 MG/DL (ref 70–99)
GLUCOSE BLD-MCNC: 105 MG/DL (ref 70–99)
GLUCOSE BLD-MCNC: 105 MG/DL (ref 70–99)
GLUCOSE BLD-MCNC: 106 MG/DL (ref 70–99)
GLUCOSE BLD-MCNC: 113 MG/DL (ref 70–99)
HCT VFR BLD CALC: 36.7 % (ref 40.5–52.5)
HEMATOLOGY PATH CONSULT: NORMAL
HEMATOLOGY PATH CONSULT: YES
HEMOGLOBIN: 12.5 G/DL (ref 13.5–17.5)
LYMPHOCYTES ABSOLUTE: 1.4 K/UL (ref 1–5.1)
LYMPHOCYTES RELATIVE PERCENT: 17 %
MCH RBC QN AUTO: 34.2 PG (ref 26–34)
MCHC RBC AUTO-ENTMCNC: 34.1 G/DL (ref 31–36)
MCV RBC AUTO: 100.2 FL (ref 80–100)
MONOCYTES ABSOLUTE: 1.1 K/UL (ref 0–1.3)
MONOCYTES RELATIVE PERCENT: 14 %
NEUTROPHILS ABSOLUTE: 5.3 K/UL (ref 1.7–7.7)
NEUTROPHILS RELATIVE PERCENT: 63 %
PDW BLD-RTO: 13.2 % (ref 12.4–15.4)
PERFORMED ON: ABNORMAL
PLATELET # BLD: 298 K/UL (ref 135–450)
PLATELET SLIDE REVIEW: ADEQUATE
PMV BLD AUTO: 8.1 FL (ref 5–10.5)
POTASSIUM REFLEX MAGNESIUM: 4.6 MMOL/L (ref 3.5–5.1)
RBC # BLD: 3.66 M/UL (ref 4.2–5.9)
SLIDE REVIEW: ABNORMAL
SODIUM BLD-SCNC: 135 MMOL/L (ref 136–145)
TOTAL PROTEIN: 7.7 G/DL (ref 6.4–8.2)
WBC # BLD: 7.9 K/UL (ref 4–11)

## 2020-07-14 PROCEDURE — 92526 ORAL FUNCTION THERAPY: CPT

## 2020-07-14 PROCEDURE — 6370000000 HC RX 637 (ALT 250 FOR IP): Performed by: INTERNAL MEDICINE

## 2020-07-14 PROCEDURE — 6360000002 HC RX W HCPCS: Performed by: INTERNAL MEDICINE

## 2020-07-14 PROCEDURE — 2580000003 HC RX 258: Performed by: INTERNAL MEDICINE

## 2020-07-14 PROCEDURE — 85025 COMPLETE CBC W/AUTO DIFF WBC: CPT

## 2020-07-14 PROCEDURE — 2000000000 HC ICU R&B

## 2020-07-14 PROCEDURE — 97129 THER IVNTJ 1ST 15 MIN: CPT

## 2020-07-14 PROCEDURE — 80053 COMPREHEN METABOLIC PANEL: CPT

## 2020-07-14 PROCEDURE — 36415 COLL VENOUS BLD VENIPUNCTURE: CPT

## 2020-07-14 RX ADMIN — MAGNESIUM GLUCONATE 500 MG ORAL TABLET 400 MG: 500 TABLET ORAL at 08:43

## 2020-07-14 RX ADMIN — THERA TABS 1 TABLET: TAB at 08:43

## 2020-07-14 RX ADMIN — LORAZEPAM 4 MG: 2 INJECTION INTRAMUSCULAR; INTRAVENOUS at 06:44

## 2020-07-14 RX ADMIN — CHLORDIAZEPOXIDE HYDROCHLORIDE 50 MG: 25 CAPSULE ORAL at 20:29

## 2020-07-14 RX ADMIN — POTASSIUM CHLORIDE 40 MEQ: 1500 TABLET, EXTENDED RELEASE ORAL at 08:43

## 2020-07-14 RX ADMIN — CHLORDIAZEPOXIDE HYDROCHLORIDE 50 MG: 25 CAPSULE ORAL at 08:43

## 2020-07-14 RX ADMIN — LORAZEPAM 4 MG: 2 INJECTION INTRAMUSCULAR; INTRAVENOUS at 16:11

## 2020-07-14 RX ADMIN — THIAMINE HYDROCHLORIDE 500 MG: 100 INJECTION, SOLUTION INTRAMUSCULAR; INTRAVENOUS at 08:47

## 2020-07-14 RX ADMIN — LORAZEPAM 2 MG: 2 INJECTION INTRAMUSCULAR; INTRAVENOUS at 20:29

## 2020-07-14 RX ADMIN — MAGNESIUM GLUCONATE 500 MG ORAL TABLET 400 MG: 500 TABLET ORAL at 20:44

## 2020-07-14 RX ADMIN — LORAZEPAM 4 MG: 2 INJECTION INTRAMUSCULAR; INTRAVENOUS at 18:05

## 2020-07-14 RX ADMIN — LORAZEPAM 3 MG: 2 INJECTION INTRAMUSCULAR; INTRAVENOUS at 02:10

## 2020-07-14 RX ADMIN — CHLORDIAZEPOXIDE HYDROCHLORIDE 50 MG: 25 CAPSULE ORAL at 15:50

## 2020-07-14 RX ADMIN — Medication 10 ML: at 21:28

## 2020-07-14 RX ADMIN — Medication 10 ML: at 08:44

## 2020-07-14 RX ADMIN — FOLIC ACID 1 MG: 1 TABLET ORAL at 08:43

## 2020-07-14 RX ADMIN — DEXTROSE AND SODIUM CHLORIDE: 5; 900 INJECTION, SOLUTION INTRAVENOUS at 07:10

## 2020-07-14 RX ADMIN — ENOXAPARIN SODIUM 40 MG: 40 INJECTION SUBCUTANEOUS at 08:44

## 2020-07-14 ASSESSMENT — PAIN SCALES - GENERAL
PAINLEVEL_OUTOF10: 0

## 2020-07-14 NOTE — PROGRESS NOTES
PRN  glucagon (rDNA) injection 1 mg, PRN  dextrose 5 % solution, PRN  sodium chloride flush 0.9 % injection 10 mL, 2 times per day  sodium chloride flush 0.9 % injection 10 mL, PRN  promethazine (PHENERGAN) tablet 12.5 mg, Q6H PRN    Or  ondansetron (ZOFRAN) injection 4 mg, Q6H PRN  enoxaparin (LOVENOX) injection 40 mg, Daily  potassium chloride 10 mEq/100 mL IVPB (Peripheral Line), PRN  magnesium sulfate 2 g in 50 mL IVPB premix, PRN        Objective:  /88   Pulse 88   Temp 97.5 °F (36.4 °C) (Temporal)   Resp 14   Ht 5' 9\" (1.753 m)   Wt 153 lb (69.4 kg)   SpO2 92%   BMI 22.59 kg/m²     Intake/Output Summary (Last 24 hours) at 7/14/2020 1411  Last data filed at 7/14/2020 0200  Gross per 24 hour   Intake 820 ml   Output --   Net 820 ml      Wt Readings from Last 3 Encounters:   07/13/20 153 lb (69.4 kg)   08/01/19 151 lb 1.6 oz (68.5 kg)   12/15/18 149 lb 14.6 oz (68 kg)       General appearance: White male, sleeping, arousable, confused upon waking up  Cardiovascular: Regular rhythm, normal S1, S2. No murmur. No edema in lower extremities  Respiratory: Not using accessory muscles. Good inspiratory effort. Clear to auscultation bilaterally, no wheeze or crackles.    GI: Abdomen soft, no tenderness, not distended, normal bowel sounds  Musculoskeletal: No cyanosis in digits, neck supple  Skin: Warm, dry, normal turgor  Extremity exam shows brisk capillary refill.  Peripheral pulses are palpable in lower extremities       Labs and Tests:  CBC:   Recent Labs     07/12/20 0828 07/13/20 0430 07/14/20 0444   WBC 5.0 5.7 7.9   HGB 12.0* 13.2* 12.5*    233 298     BMP:    Recent Labs     07/12/20 0828 07/13/20 0430 07/14/20  0444   * 135* 135*   K 3.8 4.2 4.6   CL 99 99 99   CO2 21 22 23   BUN 3* <2* 5*   CREATININE <0.5* <0.5* 0.7*   GLUCOSE 96 100* 106*     Hepatic:   Recent Labs     07/12/20  0828 07/13/20  0430 07/14/20  0444   AST 78* 86* 74*   * 127* 134*   BILITOT 0.7 0.6 0.4 ALKPHOS 42 48 53     CT Cervical Spine WO Contrast   Final Result   No acute abnormality of the cervical spine. CT Head WO Contrast   Final Result   No acute intracranial abnormality. Problem List  Active Problems:    Alcohol withdrawal seizure, uncomplicated (HCC)    Delirium tremens ([address]  Resolved Problems:    * No resolved hospital problems. *       Assessment & Plan:      Delirium tremens:   -Still remains confused. But may be some improvementestraint in place  -Continue CIWA protocol. -IV thiamine and folic acid.       HAGMA:   Improving. Normal lactate level.      Hypokalemia:  on IV replacement as per protocol.      Hypomagnesemia:  Serum Mg replaced yesterday. On Mg replacement protocol.      Mild thrombocytopenia:  Platelet count have normalized. Will follow up on am labs.       Mild hyponatremia:  sr Na in normal range.      IV Access: Peripheral  Alcantara: None  Diet: NPO, speech and swallow eval ordered.   Code: Full Code  DVT PPX Lovenox sq q daily  Disposition: pending acute issues,     Antonette Gerardo MD   7/14/2020 2:11 PM

## 2020-07-14 NOTE — PROGRESS NOTES
Pt became combative and escaped bilat wrist restraints. Multiple RNs attempted to calm pt and re-orient. Took several RNs great deal of effort to re-apply wrist restraints. Prn ativan administered. No injury to pt or RNs. Monitoring closely, pt subdued at this time.      Iesha Perry BSN, RN

## 2020-07-14 NOTE — PROGRESS NOTES
Speech Language Pathology  Dysphagia Treatment Note/Cognitive Evaluation     Name:  Julietta Barthel  :   1975  Medical Diagnosis:  Alcohol withdrawal seizure, uncomplicated ([facility] [address] [S08.676]  Alcohol withdrawal seizure, uncomplicated ([facility] [address] [Y46.732]  [facility] [F10.231]  Treatment Diagnosis: Oropharyngeal Dysphagia  Pain level:  Pt did not report pain    Current Diet Level: NPO except tsp presentations of moderately thick  Or puree with max supervision when pt is alert and responsive    Tolerance of Current Diet Level: Per RN report, Pt tolerated medication in puree this date. Assessment of Texture Tolerance:  -Impressions: Pt was seen sitting upright in bed, he was in bilateral restraints and required total assistance with feeding. Pt was awake however minimal eye opening was noted during session. Pt demonstrated reduced orientation to situation however was intermittently oriented to current facility. Trials of thin liquids, nectar thick liquids, honey thick liquids, purees, and soft solids were provided. Pt demonstrated delayed swallow initiation with all textures with intermittent clinical signs of reduced pharyngeal clearing. As trials progressed increased \"wet\" vocal quality was noted. Rapid bolus loss to pharynx was noted with thin liquids with intermittent s/s of aspiration/penetration. Initially Pt tolerated nectar thick liquid trials, however one episode of overt coughing was noted. Improved bolus control and tolerance was noted with honey thick liquids. Pt demonstrated mildly prolonged mastication with soft solids, adequate oral clearance was achieved. Overall, Pt is at increased risk for aspiration based on cognitive status, reduced coordination, delayed swallow initiation and suspected reduced pharyngeal clearing. Recommend close diet tolerance monitoring, if difficulty is noted recommend downgrade to NPO.      Diet and Treatment Recommendations:  1.) Advance to a Dysphagia II Minced and Moist with Moderately Thick (honey) Liquids, no straws, meds crushed in puree  2.) Assistance with feeding, ensure Pt takes small drinks  3.) If difficulty is noted, recommend downgrade to NPO with ongoing swallowing assessment     Dysphagia Goals:  1.) Pt will tolerate ongoing assessment for po diet readiness (ongoing 7/14/2020)   2.) pt will tolerated staff assisted tsp presentation of moderately thick (honey ) and puree without s/s of penetration/aspiration (ongoing 7/14/2020)     Cognitive Evaluation  Reduced orientation with confusion negatively impacts carryover of compensatory techniques for swallow safety. Pt is oriented to self only and intermittently to facility. Decreased orientation to situation is noted. Short term recall of daily events and new information is impaired, and further contributes to confused state. Therefore, improved orientation and techniques to improve short term recall will be incorporated in therapy, in order to improve recall of compensatory strategies, diet texture recommendations, and aspiration precautions. Goals:  1)Pt will improve orientation to x4, for improved awareness of surroundings and reduced confusion  2)Pt will improve short term recall of daily events and newly learned information via graded tasks, to 50%, for improved safety with dysphagia recommendations. Plan:  Continued daily Dysphagia treatment with goals per plan of care. Patient/Family Education:Education given to the Pt (requires ongoing education) and nurse, who verbalized understanding    Discharge Recommendations:  Pt will benefit from continued skilled Speech Therapy for Dysphagia services, prior to returning home. Timed Code Treatment: 12 minutes    Total Treatment Time: 25 minutes     If patient discharges prior to next session this note will serve as a discharge summary.      Micky Treadwell M.A., [address] [place] [address]  Speech-Language Pathologist

## 2020-07-15 LAB
A/G RATIO: 1.1 (ref 1.1–2.2)
ALBUMIN SERPL-MCNC: 4.1 G/DL (ref 3.4–5)
ALP BLD-CCNC: 58 U/L (ref 40–129)
ALT SERPL-CCNC: 138 U/L (ref 10–40)
ANION GAP SERPL CALCULATED.3IONS-SCNC: 14 MMOL/L (ref 3–16)
AST SERPL-CCNC: 77 U/L (ref 15–37)
BASOPHILS ABSOLUTE: 0.1 K/UL (ref 0–0.2)
BASOPHILS RELATIVE PERCENT: 0.8 %
BILIRUB SERPL-MCNC: 0.4 MG/DL (ref 0–1)
BUN BLDV-MCNC: 6 MG/DL (ref 7–20)
CALCIUM SERPL-MCNC: 10.5 MG/DL (ref 8.3–10.6)
CHLORIDE BLD-SCNC: 96 MMOL/L (ref 99–110)
CO2: 24 MMOL/L (ref 21–32)
CREAT SERPL-MCNC: 0.7 MG/DL (ref 0.9–1.3)
EOSINOPHILS ABSOLUTE: 0.1 K/UL (ref 0–0.6)
EOSINOPHILS RELATIVE PERCENT: 1.6 %
GFR AFRICAN AMERICAN: >60
GFR NON-AFRICAN AMERICAN: >60
GLOBULIN: 3.9 G/DL
GLUCOSE BLD-MCNC: 102 MG/DL (ref 70–99)
HCT VFR BLD CALC: 38.1 % (ref 40.5–52.5)
HEMOGLOBIN: 12.7 G/DL (ref 13.5–17.5)
LYMPHOCYTES ABSOLUTE: 1.6 K/UL (ref 1–5.1)
LYMPHOCYTES RELATIVE PERCENT: 20.4 %
MCH RBC QN AUTO: 33.7 PG (ref 26–34)
MCHC RBC AUTO-ENTMCNC: 33.4 G/DL (ref 31–36)
MCV RBC AUTO: 100.9 FL (ref 80–100)
MONOCYTES ABSOLUTE: 1.4 K/UL (ref 0–1.3)
MONOCYTES RELATIVE PERCENT: 17.8 %
NEUTROPHILS ABSOLUTE: 4.5 K/UL (ref 1.7–7.7)
NEUTROPHILS RELATIVE PERCENT: 59.4 %
PDW BLD-RTO: 13.1 % (ref 12.4–15.4)
PLATELET # BLD: 343 K/UL (ref 135–450)
PMV BLD AUTO: 8.1 FL (ref 5–10.5)
POTASSIUM REFLEX MAGNESIUM: 4.7 MMOL/L (ref 3.5–5.1)
RBC # BLD: 3.78 M/UL (ref 4.2–5.9)
SODIUM BLD-SCNC: 134 MMOL/L (ref 136–145)
TOTAL PROTEIN: 8 G/DL (ref 6.4–8.2)
WBC # BLD: 7.6 K/UL (ref 4–11)

## 2020-07-15 PROCEDURE — 2000000000 HC ICU R&B

## 2020-07-15 PROCEDURE — 6370000000 HC RX 637 (ALT 250 FOR IP): Performed by: INTERNAL MEDICINE

## 2020-07-15 PROCEDURE — 94760 N-INVAS EAR/PLS OXIMETRY 1: CPT

## 2020-07-15 PROCEDURE — 6360000002 HC RX W HCPCS: Performed by: INTERNAL MEDICINE

## 2020-07-15 PROCEDURE — 85025 COMPLETE CBC W/AUTO DIFF WBC: CPT

## 2020-07-15 PROCEDURE — 2580000003 HC RX 258: Performed by: INTERNAL MEDICINE

## 2020-07-15 PROCEDURE — 80053 COMPREHEN METABOLIC PANEL: CPT

## 2020-07-15 RX ORDER — CHLORDIAZEPOXIDE HYDROCHLORIDE 25 MG/1
25 CAPSULE, GELATIN COATED ORAL 3 TIMES DAILY PRN
Status: DISCONTINUED | OUTPATIENT
Start: 2020-07-15 | End: 2020-07-16

## 2020-07-15 RX ADMIN — Medication 10 ML: at 22:07

## 2020-07-15 RX ADMIN — CHLORDIAZEPOXIDE HYDROCHLORIDE 50 MG: 25 CAPSULE ORAL at 09:24

## 2020-07-15 RX ADMIN — POTASSIUM CHLORIDE 40 MEQ: 1500 TABLET, EXTENDED RELEASE ORAL at 18:31

## 2020-07-15 RX ADMIN — POTASSIUM CHLORIDE 40 MEQ: 1500 TABLET, EXTENDED RELEASE ORAL at 09:23

## 2020-07-15 RX ADMIN — MAGNESIUM GLUCONATE 500 MG ORAL TABLET 400 MG: 500 TABLET ORAL at 22:05

## 2020-07-15 RX ADMIN — Medication 10 ML: at 09:46

## 2020-07-15 RX ADMIN — THERA TABS 1 TABLET: TAB at 09:25

## 2020-07-15 RX ADMIN — FOLIC ACID 1 MG: 1 TABLET ORAL at 09:47

## 2020-07-15 RX ADMIN — THIAMINE HYDROCHLORIDE 500 MG: 100 INJECTION, SOLUTION INTRAMUSCULAR; INTRAVENOUS at 09:23

## 2020-07-15 RX ADMIN — MAGNESIUM GLUCONATE 500 MG ORAL TABLET 400 MG: 500 TABLET ORAL at 10:24

## 2020-07-15 RX ADMIN — ENOXAPARIN SODIUM 40 MG: 40 INJECTION SUBCUTANEOUS at 09:23

## 2020-07-15 ASSESSMENT — PAIN SCALES - GENERAL
PAINLEVEL_OUTOF10: 0

## 2020-07-15 NOTE — PROGRESS NOTES
Awakened for assessment. Arouses to name, answers questions correctly and appropriately. Asks how long he will be in the hospital.  Cooperative at this time. Respirations easy, non labored at rest.  O2 sat 96% on room air. Chest sounds clear bilaterally with equal air exchange. Monitor reveals NSR without ectopy. Abdomen soft,non tender with BS present x 4. No edema noted and pulses strong bilaterally. Complete assessment done and recorded. See flow sheet for details.

## 2020-07-15 NOTE — PROGRESS NOTES
Noon assessment done and recorded. Drowsy and remains asleep unless disturbed. Assessment done and recorded. See flow sheet for details.

## 2020-07-15 NOTE — CARE COORDINATION
Patient was in deep sleep when CM entered room. CM called patient mother, Danae Cho ([phone]), she states he has previously [facility] to [facility] in November or December of last year and he refuses to return there. She wants him to go to an inpatient alcohol program. CM will provide resources, patient must initiate calls to facilities for follow up management.     Renea Alonso, MARGAUXN, CCM, RN  [facility]  [phone]

## 2020-07-15 NOTE — DISCHARGE INSTR - COC
Continuity of Care Form    Patient Name: Bran Sinclair   :  1975  MRN:  [id number]    Admit date:  2020  Discharge date:  2020    Code Status Order: Full Code   Advance Directives:     Admitting Physician:  Susannah Kirkpatrick MD  PCP: Grupo Sherman MD    Discharging Nurse: Ion Clayton RN  [address] Unit/Room#: JYX-5530/5445-02  Discharging Unit Phone Number: [phone]    Emergency Contact:   Extended Emergency Contact Information  Primary Emergency Contact: [place] Phone: [phone]  Relation: Parent    Past Surgical History:  Past Surgical History:   Procedure Laterality Date    WRIST SURGERY Right        Immunization History:   Immunization History   Administered Date(s) Administered    Influenza, MDCK Quadv, IM, PF (Flucelvax 4 yrs and older) 10/17/2017    Influenza, Annette Like, 6 mo and older, IM, PF (Flulaval, Fluarix) 2018       Active Problems:  Patient Active Problem List   Diagnosis Code    Closed fracture of left ankle with routine healing S82.892D    Alcohol withdrawal seizure without complication (HCC) I94.106    Alcohol withdrawal syndrome with complication ([address] M11.029    Alcohol dependence ([address] F10.20    Seizure ([address] R56.9    Acute metabolic encephalopathy X70.69    Hypokalemia E87.6    Electrolyte disorder E87.8    Seizure-like activity ([address] S80.7    Alcoholic encephalopathy ([address] G31.2, F10.20    Alcohol withdrawal seizure, uncomplicated ([address] V01.839    Delirium tremens ([address] F10.231       Isolation/Infection:   Isolation          No Isolation        Patient Infection Status     None to display          Nurse Assessment:  Last Vital Signs: /76   Pulse 94   Temp 97.9 °F (36.6 °C) (Temporal)   Resp 10   Ht 5' 9\" (1.753 m)   Wt 150 lb 5.7 oz (68.2 kg)   SpO2 95%   BMI 22.20 kg/m²     Last documented pain score (0-10 scale): Pain Level: 0  Last Weight:   Wt Readings from Last 1 Encounters:   07/15/20 150 lb 5.7 oz (68.2 kg)     Mental Status:  alert    IV Access:  - None    Nursing Mobility/ADLs:  Walking   Independent  Transfer  Independent  Bathing  Independent  Dressing  Independent  Toileting  Independent  Feeding  Independent  Med Admin  Assisted  Med Delivery   whole    Wound Care Documentation and Therapy:        Elimination:  Continence:   · Bowel: Yes  · Bladder: Yes  Urinary Catheter: None   Colostomy/Ileostomy/Ileal Conduit: No       Date of Last BM: 7/17/20    Intake/Output Summary (Last 24 hours) at 7/15/2020 1441  Last data filed at 7/14/2020 1820  Gross per 24 hour   Intake 500 ml   Output --   Net 500 ml     I/O last 3 completed shifts: In: 500 [I.V.:500]  Out: -     Safety Concerns:     History of Falls (last 30 days)    Impairments/Disabilities:      None    Nutrition Therapy:  Current Nutrition Therapy:   - Oral Diet:  General    Routes of Feeding: Oral  Liquids: Thin Liquids  Daily Fluid Restriction: no  Last Modified Barium Swallow with Video (Video Swallowing Test): not done    Treatments at the Time of Hospital Discharge:   Respiratory Treatments: none  Oxygen Therapy:  is not on home oxygen therapy.   Ventilator:    - No ventilator support    Rehab Therapies: Occupational Therapy  Weight Bearing Status/Restrictions: No weight bearing restirctions  Other Medical Equipment (for information only, NOT a DME order):  none  Other Treatments: ***    Patient's personal belongings (please select all that are sent with patient):  {JULISSA DME Belongings:[id number]}    RN SIGNATURE:  {Esignature:[id number]}    CASE MANAGEMENT/SOCIAL WORK SECTION    Inpatient Status Date: ***    Readmission Risk Assessment Score:  Readmission Risk              Risk of Unplanned Readmission:        15           Discharging to Facility/ [facility] [address]  Phone [phone]  Fax [phone]    / signature: Electronically signed by Mary Atwood RN on 7/17/20 at 3:50 PM EDT    PHYSICIAN SECTION    Prognosis: Good    Condition at Discharge: Stable    Rehab Potential (if transferring to Rehab): Good    Recommended Labs or Other Treatments After Discharge:     Physician Certification: I certify the above information and transfer of Gilberto Gallardo  is necessary for the continuing treatment of the diagnosis listed and that he requires [facility] for greater 30 days.      Update Admission H&P: No change in H&P    PHYSICIAN SIGNATURE:  Electronically signed by Glen Mccollum MD on 7/17/20 at 3:13 PM EDT

## 2020-07-15 NOTE — PROGRESS NOTES
data filed at 7/14/2020 1820  Gross per 24 hour   Intake 500 ml   Output --   Net 500 ml      Wt Readings from Last 3 Encounters:   07/15/20 150 lb 5.7 oz (68.2 kg)   08/01/19 151 lb 1.6 oz (68.5 kg)   12/15/18 149 lb 14.6 oz (68 kg)       General appearance:  Appears comfortable  Eyes: Sclera clear. Pupils equal.  ENT: Moist oral mucosa. Trachea midline, no adenopathy. Cardiovascular: Regular rhythm, normal S1, S2. No murmur. No edema in lower extremities  Respiratory: Not using accessory muscles. Good inspiratory effort. Clear to auscultation bilaterally, no wheeze or crackles. GI: Abdomen soft, no tenderness, not distended, normal bowel sounds  Musculoskeletal: No cyanosis in digits, neck supple  Neurology: CN 2-12 grossly intact. No speech or motor deficits  Psych: Normal affect. Alert and oriented in time, place and person  Skin: Warm, dry, normal turgor    Labs and Tests:  CBC:   Recent Labs     07/13/20  0430 07/14/20  0444 07/15/20  0430   WBC 5.7 7.9 7.6   HGB 13.2* 12.5* 12.7*    298 343     BMP:    Recent Labs     07/13/20  0430 07/14/20  0444 07/15/20  0430   * 135* 134*   K 4.2 4.6 4.7   CL 99 99 96*   CO2 22 23 24   BUN <2* 5* 6*   CREATININE <0.5* 0.7* 0.7*   GLUCOSE 100* 106* 102*     Hepatic:   Recent Labs     07/13/20  0430 07/14/20  0444 07/15/20  0430   AST 86* 74* 77*   * 134* 138*   BILITOT 0.6 0.4 0.4   ALKPHOS 48 53 58       Discussed care with family and patient             Spent 30  minutes with patient and family at bedside and on unit reviewing medical records and labs, spent greater than 50% time counseling patient and family on diagnosis and plan   Problem List  Active Problems:    Alcohol withdrawal seizure, uncomplicated (HCC)    Delirium tremens ([address]  Resolved Problems:    * No resolved hospital problems. Silva Bolaños is a 54 y. o. male presents to the emergency department via [facility] after his son found him unresponsive and possibly having a seizure.  In ED, patient was actively hallucinating and noted to be tremulous.  As per family, patient has been consuming alcohol on a regular basis in combination with cocaine.  Patient was alert and oriented in ED and able to follow commands.  Over the course of hospital stay, patient started getting more agitated and aggressive. Veto Mariano as per [facility] protocol and higher doses on 7/8 pm did not help patient with agitation.  He was started on phenobarbital IV scheduled dosing and transfer to [address] [address] ICU    Acute alcohol withdrawal delirium. Assessment & Plan:   1. Acute alcohol withdrawal delirium  -Needing phenobarbital weaned off. Now on IV Ativan PRN. -Patient is improving still needing less Ativan much calmer today than yesterday.  -We will transfer out of ICU. Monitor closely wean off Ativan with discharge planning  -Hopefully home in next 24 to 48 hours if he continues to improve      Diet: DIET DYSPHAGIA MINCED AND MOIST; Moderately Thick (Honey);  No Drinking Straw  Code:Full Code  DVT PPX lovenox       Ary Denver, MD   7/15/2020 1:23 PM

## 2020-07-15 NOTE — PROGRESS NOTES
Assessment completed, see flowsheet for details. Respiratory: Lung sounds are clear bilaterally. GI: Bowel sounds are present in all quadrants. No N/V reported. : Within defined limits. Incontinent    Neuro: Pt A/O x2 Oriented to person and self. Peripheral vascular: Pulses are palpable in all extremities. Skin: No evidence of new skin breakdown assessed during shift. Pain: Pt reported a 0 on a 0-10 scale. CIWA: 13 PRN Ativan 2mg given. BP (!) 140/84   Pulse 76   Temp 96.4 °F (35.8 °C) (Temporal)   Resp 18   Ht 5' 9\" (1.753 m)   Wt 153 lb (69.4 kg)   SpO2 99%   BMI 22.59 kg/m²      Call light within reach. Will continue to monitor.

## 2020-07-15 NOTE — PROGRESS NOTES
Physician Progress Note      PATIENTCopita Duran  CSN #:                  [id number]  :                       1975  ADMIT DATE:       2020 11:07 PM  [address] DATE:  RESPONDING  PROVIDER #:        Margarita Kemp MD          QUERY TEXT:    Patient admitted with Alcohol Withdrawal. Per dietician documentation patient   meets ASPEN criteria for Mild malnutrition. If possible, please document in   progress notes and discharge summary if you are evaluating and /or treating   any of the following: The medical record reflects the following:  Risk Factors: Prior to admission, pt was using cocaine with ETOH. Wt is   stable per hx. Pt is in CIWA precautions & very confused  Clinical Indicators: NPO status x 5 days of admission, cocaine with ETOH use,   confusion, BMI 22.6  Treatment: NPO, CIWA, ICU monitoring    Thank you. Please call if you have any questions [phone] Harsh Moon RN,   CDS  Options provided:  -- Protein calorie malnutrition mild  -- Protein calorie malnutrition  -- Refer to Clinical Documentation Reviewer    PROVIDER RESPONSE TEXT:    This patient has mild protein calorie malnutrition.     Query created by: Neva Subramanian on 7/15/2020 12:15 PM      Electronically signed by:  Margarita Kemp MD 7/15/2020 2:40 PM

## 2020-07-15 NOTE — PROGRESS NOTES
Afternoon assessment done and recorded. Patient sleeping unless disturbed. Mother here to visit briefly. Spoke with  to obtain list of facilities for alcohol rehab. See flow sheet for details.

## 2020-07-15 NOTE — PROGRESS NOTES
Report received from off going shift to ensure safe transfer of care. Patient appears asleep and not awakened at this time. Will return for complete assessment.

## 2020-07-16 LAB
A/G RATIO: 0.9 (ref 1.1–2.2)
ALBUMIN SERPL-MCNC: 3.9 G/DL (ref 3.4–5)
ALP BLD-CCNC: 59 U/L (ref 40–129)
ALT SERPL-CCNC: 120 U/L (ref 10–40)
ANION GAP SERPL CALCULATED.3IONS-SCNC: 12 MMOL/L (ref 3–16)
AST SERPL-CCNC: 62 U/L (ref 15–37)
BASOPHILS ABSOLUTE: 0 K/UL (ref 0–0.2)
BASOPHILS RELATIVE PERCENT: 1.1 %
BILIRUB SERPL-MCNC: <0.2 MG/DL (ref 0–1)
BUN BLDV-MCNC: 12 MG/DL (ref 7–20)
CALCIUM SERPL-MCNC: 10.2 MG/DL (ref 8.3–10.6)
CHLORIDE BLD-SCNC: 97 MMOL/L (ref 99–110)
CO2: 25 MMOL/L (ref 21–32)
CREAT SERPL-MCNC: 0.9 MG/DL (ref 0.9–1.3)
EOSINOPHILS ABSOLUTE: 0.1 K/UL (ref 0–0.6)
EOSINOPHILS RELATIVE PERCENT: 1.8 %
GFR AFRICAN AMERICAN: >60
GFR NON-AFRICAN AMERICAN: >60
GLOBULIN: 4.3 G/DL
GLUCOSE BLD-MCNC: 135 MG/DL (ref 70–99)
GLUCOSE BLD-MCNC: 141 MG/DL (ref 70–99)
HCT VFR BLD CALC: 36.7 % (ref 40.5–52.5)
HEMOGLOBIN: 12.6 G/DL (ref 13.5–17.5)
LYMPHOCYTES ABSOLUTE: 1.6 K/UL (ref 1–5.1)
LYMPHOCYTES RELATIVE PERCENT: 34.5 %
MCH RBC QN AUTO: 34.8 PG (ref 26–34)
MCHC RBC AUTO-ENTMCNC: 34.3 G/DL (ref 31–36)
MCV RBC AUTO: 101.5 FL (ref 80–100)
MONOCYTES ABSOLUTE: 1 K/UL (ref 0–1.3)
MONOCYTES RELATIVE PERCENT: 21.3 %
NEUTROPHILS ABSOLUTE: 1.9 K/UL (ref 1.7–7.7)
NEUTROPHILS RELATIVE PERCENT: 41.3 %
PDW BLD-RTO: 13.2 % (ref 12.4–15.4)
PERFORMED ON: ABNORMAL
PLATELET # BLD: 381 K/UL (ref 135–450)
PMV BLD AUTO: 7.8 FL (ref 5–10.5)
POTASSIUM REFLEX MAGNESIUM: 5 MMOL/L (ref 3.5–5.1)
RBC # BLD: 3.62 M/UL (ref 4.2–5.9)
SODIUM BLD-SCNC: 134 MMOL/L (ref 136–145)
TOTAL PROTEIN: 8.2 G/DL (ref 6.4–8.2)
WBC # BLD: 4.6 K/UL (ref 4–11)

## 2020-07-16 PROCEDURE — U0003 INFECTIOUS AGENT DETECTION BY NUCLEIC ACID (DNA OR RNA); SEVERE ACUTE RESPIRATORY SYNDROME CORONAVIRUS 2 (SARS-COV-2) (CORONAVIRUS DISEASE [COVID-19]), AMPLIFIED PROBE TECHNIQUE, MAKING USE OF HIGH THROUGHPUT TECHNOLOGIES AS DESCRIBED BY CMS-2020-01-R: HCPCS

## 2020-07-16 PROCEDURE — 6360000002 HC RX W HCPCS: Performed by: INTERNAL MEDICINE

## 2020-07-16 PROCEDURE — 2000000000 HC ICU R&B

## 2020-07-16 PROCEDURE — 6370000000 HC RX 637 (ALT 250 FOR IP): Performed by: INTERNAL MEDICINE

## 2020-07-16 PROCEDURE — 92526 ORAL FUNCTION THERAPY: CPT

## 2020-07-16 PROCEDURE — 2580000003 HC RX 258: Performed by: INTERNAL MEDICINE

## 2020-07-16 PROCEDURE — 80053 COMPREHEN METABOLIC PANEL: CPT

## 2020-07-16 PROCEDURE — 97166 OT EVAL MOD COMPLEX 45 MIN: CPT

## 2020-07-16 PROCEDURE — 85025 COMPLETE CBC W/AUTO DIFF WBC: CPT

## 2020-07-16 PROCEDURE — 97116 GAIT TRAINING THERAPY: CPT

## 2020-07-16 PROCEDURE — 97162 PT EVAL MOD COMPLEX 30 MIN: CPT

## 2020-07-16 PROCEDURE — 6370000000 HC RX 637 (ALT 250 FOR IP): Performed by: HOSPITALIST

## 2020-07-16 PROCEDURE — 97530 THERAPEUTIC ACTIVITIES: CPT

## 2020-07-16 PROCEDURE — 97535 SELF CARE MNGMENT TRAINING: CPT

## 2020-07-16 PROCEDURE — 97129 THER IVNTJ 1ST 15 MIN: CPT

## 2020-07-16 RX ORDER — CHLORDIAZEPOXIDE HYDROCHLORIDE 10 MG/1
10 CAPSULE, GELATIN COATED ORAL 3 TIMES DAILY PRN
Status: DISCONTINUED | OUTPATIENT
Start: 2020-07-16 | End: 2020-07-17 | Stop reason: HOSPADM

## 2020-07-16 RX ADMIN — CHLORDIAZEPOXIDE HYDROCHLORIDE 25 MG: 25 CAPSULE ORAL at 08:43

## 2020-07-16 RX ADMIN — MAGNESIUM GLUCONATE 500 MG ORAL TABLET 400 MG: 500 TABLET ORAL at 08:30

## 2020-07-16 RX ADMIN — THIAMINE HYDROCHLORIDE 500 MG: 100 INJECTION, SOLUTION INTRAMUSCULAR; INTRAVENOUS at 08:30

## 2020-07-16 RX ADMIN — CHLORDIAZEPOXIDE HYDROCHLORIDE 25 MG: 25 CAPSULE ORAL at 00:21

## 2020-07-16 RX ADMIN — Medication 10 ML: at 08:32

## 2020-07-16 RX ADMIN — Medication 10 ML: at 22:15

## 2020-07-16 RX ADMIN — FOLIC ACID 1 MG: 1 TABLET ORAL at 08:29

## 2020-07-16 RX ADMIN — THERA TABS 1 TABLET: TAB at 08:30

## 2020-07-16 RX ADMIN — MAGNESIUM GLUCONATE 500 MG ORAL TABLET 400 MG: 500 TABLET ORAL at 22:15

## 2020-07-16 RX ADMIN — ENOXAPARIN SODIUM 40 MG: 40 INJECTION SUBCUTANEOUS at 08:29

## 2020-07-16 ASSESSMENT — PAIN SCALES - GENERAL
PAINLEVEL_OUTOF10: 0

## 2020-07-16 NOTE — PROGRESS NOTES
Afternoon assessment done and recorded. Has intermittent episodes of slight agitation and states that he should be allowed to go home. Agreeable after some conversation.

## 2020-07-16 NOTE — CARE COORDINATION
Discharge planning note:    Met with patient at bedside to get preferences for IP Recovery programs. Still appears confused with details. Referrals sent to:  [facility] [place], [address]. Case management will follow up with referrals later in the day.     Chapo Juarez RN BSN  Case Management   [phone]

## 2020-07-16 NOTE — PROGRESS NOTES
Speech Language Pathology  Dysphagia Treatment Note/Cognitive Evaluation     Name:  Ezequiel Brizuela  :   1975  Medical Diagnosis:  Alcohol withdrawal seizure, uncomplicated ([facility] [F10.144]  Alcohol withdrawal seizure, uncomplicated ([facility] [D74.712]  Delirium tremens ([facility] [F10.231]  Treatment Diagnosis: Oropharyngeal Dysphagia  Pain level:  Pt did not report pain    Current Diet Level: General and Moderately thick liquids; no drinking straw    Tolerance of Current Diet Level: Per RN, pt with improved alertness this date. RN reported that pt was complaining about dysphagia II minced and moist diet; MD upgraded pt to regular textures this date. Assessment of Texture Tolerance:  -Impressions: Pt was seen upright in chair. Pt was asleep upon SLP entrance but easily woke with minimal prompts. Pt able to self feed this date. Pt oriented to person, place, month, and year independently; CHITO with minimal cues. Pt completed trials of nectar liquids via cup, thin liquids via cup, puree and hard solids. Pt presented with intermitted throat clears prior to and during trials; pt reported this has been normal for him but reported he is not having pain with swallowing. Pt presented with decreased bolus control/manipulation, decreased AP oral transit, suspected premature spillage to pharynx, decreased laryngeal elevation and delayed swallow initiation. Pt with improved bolus control of nectar liquids compared to thin liquids. Pt presented with x1 instance of \"wet\" vocal quality following thins. Pt with impulsivity during trials and frequent verbal cues needed to decreased rate and size of bolus. Recommend pt continue with diet of regular textures with mildly thick (nectar) liquids with strict aspiration precautions. Recommend continue with close diet tolerance monitoring, if difficulty is noted recommend downgrade to NPO with ongoing swallow assessment.        Diet and Treatment Recommendations:  1.) Regular Textures with Mildly Thick (nectar) Liquids, no straws, meds crushed in puree  2.) Assistance with feeding, ensure Pt takes small drinks  3.) If difficulty is noted, recommend downgrade to NPO with ongoing swallowing assessment     Dysphagia Goals:  1.) Pt will tolerate ongoing assessment for po diet readiness (ongoing 7/16/2020)   2.) Pt will tolerated staff assisted tsp presentation of moderately thick (honey) and puree without s/s of penetration/aspiration (ongoing 7/16/2020)     Cognitive Evaluation  Pt presented with slightly improved cognition this date. Pt oriented to person, place, month, and year independently; CHITO with minimal cues. Pt able to recall being on puree and minced and moist diet while at the hospital; unable to explain diet recommendations of moderately thick (honey) liquids. Pt able to recall aspiration precautions given moderate cues and implement precautions during trials with moderate cues. Pt continues with impulsivity during meals. Goals:  1)Pt will improve orientation to x4, for improved awareness of surroundings and reduced confusion  2)Pt will improve short term recall of daily events and newly learned information via graded tasks, to 50%, for improved safety with dysphagia recommendations. Plan:  Continued daily Dysphagia treatment with goals per plan of care. Patient/Family Education:Education given to the Pt (requires ongoing education) and nurse, who verbalized understanding    Discharge Recommendations:  Pt will benefit from continued skilled Speech Therapy for Dysphagia services, prior to returning home. Timed Code Treatment: 10 minutes    Total Treatment Time: 30 minutes     If patient discharges prior to next session this note will serve as a discharge summary.      Juan Antonio [place], [address] [facility]  Speech-Language Pathologist [phone]

## 2020-07-16 NOTE — PROGRESS NOTES
Awake, alert and answers questions appropriately. Cooperative but becoming somewhat agitated that he is in hospital and wants to have regular diet. Oriented to person and place but has poor insight as to why he is here and about his plan for recovery. Respirations easy & non labored. Chest sounds clear with equal air exchange. Monitor NSR without ectopy. Abdomen soft, non tender with BS present. No peripheral edema and pulses present. Complete assessment done and recorded. See flow sheet for details.

## 2020-07-16 NOTE — PROGRESS NOTES
Occupational Therapy   Occupational Therapy Initial Assessment  Date: 2020   Patient Name: Gee Moser  MRN: [id number]     : 1975    Date of Service: 2020    Discharge Recommendations:  Gee Moser scored a 15/24 on the AM-PAC ADL Inpatient form. Current research shows that an AM-PAC score of 17 or less is typically not associated with a discharge to the patient's home setting. Based on the patient's AM-PAC score and their current ADL deficits, it is recommended that the patient have 3-5 sessions per week of Occupational Therapy at d/c to increase the patient's independence. Please see assessment section for further patient specific details. If patient discharges prior to next session this note will serve as a discharge summary. Please see below for the latest assessment towards goals. OT Equipment Recommendations  Equipment Needed: No(TBD)    Assessment   Performance deficits / Impairments: Decreased functional mobility ; Decreased ADL status; Decreased endurance;Decreased safe awareness;Decreased balance;Decreased cognition  Assessment: Pt is not at his baseline level of occupational function, based on the above deficits associated with ETOH withdrawal, seizure. Pt would benefit from continued skilled acute OT services to address these deficits. Treatment Diagnosis: Decreased ADL status, endurance, functional mobility, safety awareness, cognition and balance associated with EOTH withdrawal, seizure  Prognosis: Good  Decision Making: Medium Complexity  History: Pt 55 yo, lives part of the time with his mother, independent with ADLs, IADLs, limited PLOF information d/t pt distracted. PMH: ETOH abuse, wrist sx  Exam: ROM, MMT, 6 clicks, 6 performance deficits/impairments, evolving presentation.   Assistance / Modification: Mod/Max A for standing balance for LB ADLs, CGA/Min A ambulation w/RW, 1 major LOB  OT Education: OT Role;Plan of Care;Precautions  Patient Education: OT mustapha, d/c recommendation. Pt needs reinforcement d/t very distracted. Barriers to Learning: Cognition  REQUIRES OT FOLLOW UP: Yes  Activity Tolerance  Activity Tolerance: Patient Tolerated treatment well  Safety Devices  Safety Devices in place: Yes  Type of devices: Call light within reach;Gait belt;Nurse notified; Chair alarm in place; Patient at risk for falls; Left in chair; All fall risk precautions in place           Patient Diagnosis(es): The primary encounter diagnosis was Seizure [facility]). A diagnosis of Alcohol withdrawal syndrome with complication [facility]) was also pertinent to this visit. has a past medical history of Alcohol withdrawal ([facility] and Seizure ([facility] has a past surgical history that includes Wrist surgery (Right). Treatment Diagnosis: Decreased ADL status, endurance, functional mobility, safety awareness, cognition and balance associated with EOTH withdrawal, seizure      Restrictions  Restrictions/Precautions  Restrictions/Precautions: Seizure, Fall Risk, Swallowing - Thickened Liquids(high fall risk, Moderately thick (honey), no straw)  Position Activity Restriction  Other position/activity restrictions: Ross Hummel is a 54 y.o. male presents to the emergency department via [facility] emergency medical services where the mother contacted them because of finding her son unresponsive and possibly having a seizure. Patient has had difficulties with alcohol intoxication as well as alcohol withdrawal and states that he last was hospitalized for this back in November. He states at that time he had his second alcohol withdrawal seizure. Patient states that he has been drinking regularly. Subjective   General  Chart Reviewed: Yes  Family / Caregiver Present: No  Referring Practitioner: Shweta Izaguirre MD, for d/c planning  Diagnosis: Alcohol withdrawal, seizure  Subjective  Subjective: Pt supine in bed on arrival, restless, wanting to get OOB, go home. Pt reports feeling \"OK. \" Denies pain.  Pt states he is going home, became agitated with mention of need for further therapy for safe discharge home. Patient Currently in Pain: Denies  Pre Treatment Pain Screening  Intervention List: Patient able to continue with treatment  Patient Currently in Pain: Denies  Social/Functional History  Social/Functional History  Lives With: (mother splits time between living with patient and her significant other)  Type of Home: House  Home Layout: Two level, Able to Live on Main level with bedroom/bathroom  Home Access: Stairs to enter with rails  Entrance Stairs - Number of Steps: 6 DANIELA from front, 2 steps to enter from back  Home Equipment: Crutches  ADL Assistance: [address]: Independent  Ambulation Assistance: Independent  Transfer Assistance: Independent  Active : Yes       Objective   Vision: Impaired  Vision Exceptions: Wears glasses for reading  Hearing: Within functional limits    Orientation  Overall Orientation Status: Impaired  Orientation Level: Oriented to person;Oriented to situation;Disoriented to place     Balance  Sitting Balance: Contact guard assistance(CGA dynamic, SBA static)  Standing Balance: Minimal assistance(Mod/Max for LB ADLs, CGA to Min A w/RW for ambulation w/1 lg LOB)  Standing Balance  Time: ~1 min, ~30 sec X 2, 4 min  Activity: laura hygiene, clothing mgt X 2, functional mobility in salazar  Comment: 1 lb LOB in [place]azar, impulsive with mobility  Functional Mobility  Functional - Mobility Device: Rolling Walker  Activity: Other  Assist Level: Minimal assistance(CGA to Min A w/1 lg LOB requiring Mod A)  ADL  Grooming: Setup;Stand by assistance(wash face seated on EOB)  UE Bathing: Stand by assistance;Verbal cueing; Increased time to complete;Setup(seated on EOB)  LE Bathing: Maximum assistance; Moderate assistance(Mod/Max A for standing balance anterior & post laura area only)  UE Dressing: Maximum assistance(gown)  LE Dressing: Verbal cueing; Increased time to complete; Moderate assistance(Mod/Max A standing balance to don pullups, shorts; CGA don socks seated EOB)  Additional Comments: Pt required Max/Mod A standing balance even leaning back against EOB for laura hygiene, clothing mgt. Pt impulsive with movement. Tone RUE  RUE Tone: Normotonic  Tone LUE  LUE Tone: Normotonic  Coordination  Movements Are Fluid And Coordinated: Yes        Transfers  Sit to stand: Contact guard assistance(3-4 times from EOB)  Stand to sit: Contact guard assistance(2=3 times)  Vision - Basic Assessment  Prior Vision: Wears glasses only for reading  Visual History: No significant visual history  Patient Visual Report: No visual complaint reported. Cognition  Overall Cognitive Status: Exceptions  Arousal/Alertness: Appropriate responses to stimuli  Following Commands: Follows one step commands consistently  Attention Span: Attends with cues to redirect; Difficulty dividing attention(Pt distractable, wanting to look through his wallet, while performing UB bathing, needing to be redirected to task.)  Memory: Decreased recall of biographical Information  Safety Judgement: Decreased awareness of need for assistance;Good awareness of safety precautions(impulsive, very unsafe)  Problem Solving: Decreased awareness of errors;Assistance required to identify errors made  Insights: Not aware of deficits  Initiation: Does not require cues  Sequencing: Requires cues for some  Perception  Overall Perceptual Status: WFL     Sensation  Overall Sensation Status: WFL        LUE AROM (degrees)  LUE AROM : WFL  Left Hand AROM (degrees)  Left Hand AROM: WFL  RUE AROM (degrees)  RUE AROM : WFL  Right Hand AROM (degrees)  Right Hand AROM: WFL  LUE Strength  Gross LUE Strength: WFL  L Hand General: NT  RUE Strength  Gross RUE Strength: WFL  R Hand General: NT                   Plan   Plan  Times per week: 3-5  Current Treatment Recommendations: Balance Training, Safety Education & Training, Self-Care / ADL, Endurance Training, Functional Mobility Training, Cognitive Reorientation      AM-PAC Score        AM-PAC Inpatient Daily Activity Raw Score: 15 (07/16/20 1359)  AM-PAC Inpatient ADL T-Scale Score : 34.69 (07/16/20 1359)  ADL Inpatient CMS 0-100% Score: 56.46 (07/16/20 1359)  ADL Inpatient CMS G-Code Modifier : CK (07/16/20 1359)    Goals  Short term goals  Time Frame for Short term goals: Discharge  Short term goal 1: SBA for functional transfers to ADL surfaces w/RW  Short term goal 2: CGA for standing balance for LB ADLs/toileting without LOB  Short term goal 3: SBA for functional mobility w/RW for ADL activity  Patient Goals   Patient goals :  To go home       Therapy Time   Individual Concurrent Group Co-treatment   Time In 4090         Time Out 1230         Minutes 38               Timed Code Treatment Minutes:  23 Minutes    Total Treatment Minutes:  [address] [facility] [address], OTR/L, [id number]\

## 2020-07-16 NOTE — PROGRESS NOTES
Comprehensive Nutrition Assessment    Type and Reason for Visit:  Reassess    Nutrition Recommendations/Plan:   1. Diet with appropriate thickness/consistency per SLP   2. Encourage PO intake   3. Document all PO intake in flow sheets     Nutrition Assessment:  Pt's nutrition status is improving. Pt consumed 100% x 2 meals on dysphagia minced and moist diet with moderately (honey) thick liquids yesterday. Per RN, pt is agitated about altered texture diet this morning and is asking for a regular diet. SLP to re-evaluate today as pt is much more awake. Will monitor for continued adequate PO intake. Malnutrition Assessment:  Malnutrition Status: At risk for malnutrition    Estimated Daily Nutrient Needs:  Energy (kcal):  8332- 2070; Weight Used for Energy Requirements:  (Continue to use 69 kg)     Protein (g):  83- 104 g(1.2-1.5); Weight Used for Protein Requirements:  Current        Fluid (ml/day):  1 ml/kcal; Weight Used for Fluid Requirements:  Current      Nutrition Related Findings:  No edema noted. Oriented to person/place. +10.5 liters per EMR      Wounds:  None       Current Nutrition Therapies:    DIET DYSPHAGIA MINCED AND MOIST; Moderately Thick (Honey); No Drinking Straw    Anthropometric Measures:  · Height: 5' 9\" (175.3 cm)  · Current Body Weight: 143 lb (64.9 kg)(Question accuracy of this weight; pt was 150 lbs on 7/15)   · Admission Body Weight: 152 lb (68.9 kg)    · Ideal Body Weight: 160 lbs  · BMI: 21.1  · BMI Categories: Normal Weight (BMI 18.5-24. 9)       Nutrition Diagnosis:   · Inadequate oral intake related to inadequate protein-energy intake, cognitive or neurological impairment as evidenced by other (comment)(Pt was NPO for the majority of 7 day admission; started on diet 7/14 with improving PO intake)      Nutrition Interventions:   Food and/or Nutrient Delivery:  Continue Current Diet  Nutrition Education/Counseling:  Education not indicated   Coordination of Nutrition Care:  Speech Therapy    Goals:  New goal: Pt will consume at least 50% of meals on appropriate diet per SLP       Nutrition Monitoring and Evaluation:   Behavioral-Environmental Outcomes:      Food/Nutrient Intake Outcomes:  Food and Nutrient Intake  Physical Signs/Symptoms Outcomes:  Chewing or Swallowing     Discharge Planning:     Too soon to determine     Electronically signed by Ayush Avendano RD, LD on 7/16/20 at 10:27 AM EDT    Contact: [phone]

## 2020-07-16 NOTE — PROGRESS NOTES
[facility] PROGRESS NOTE    7/16/2020 2:22 PM        Name: Cee Dunham . Admitted: 7/7/2020  Primary Care Provider: Soto Garcia MD (Tel: [phone])                        Subjective:  . No acute events overnight. Resting well. Pain control. Diet ok. Labs reviewed  Denies any chest pain sob.      Reviewed interval ancillary notes    Current Medications  chlordiazePOXIDE (LIBRIUM) capsule 10 mg, TID PRN  thiamine (B-1) 500 mg in sodium chloride 0.9 % 100 mL IVPB, Q24H  multivitamin 1 tablet, Daily  folic acid (FOLVITE) tablet 1 mg, Daily  LORazepam (ATIVAN) tablet 1 mg, Q1H PRN    Or  LORazepam (ATIVAN) tablet 2 mg, Q1H PRN    Or  LORazepam (ATIVAN) tablet 3 mg, Q1H PRN    Or  LORazepam (ATIVAN) tablet 4 mg, Q1H PRN  magnesium oxide (MAG-OX) tablet 400 mg, BID  potassium chloride (KLOR-CON M) extended release tablet 40 mEq, BID WC  glucose (GLUTOSE) 40 % oral gel 15 g, PRN  dextrose 50 % IV solution, PRN  glucagon (rDNA) injection 1 mg, PRN  dextrose 5 % solution, PRN  sodium chloride flush 0.9 % injection 10 mL, 2 times per day  sodium chloride flush 0.9 % injection 10 mL, PRN  promethazine (PHENERGAN) tablet 12.5 mg, Q6H PRN    Or  ondansetron (ZOFRAN) injection 4 mg, Q6H PRN  enoxaparin (LOVENOX) injection 40 mg, Daily  potassium chloride 10 mEq/100 mL IVPB (Peripheral Line), PRN  magnesium sulfate 2 g in 50 mL IVPB premix, PRN        Objective:  /77   Pulse 88   Temp 98.4 °F (36.9 °C) (Temporal)   Resp 15   Ht 5' 9\" (1.753 m)   Wt 143 lb 15.4 oz (65.3 kg)   SpO2 98%   BMI 21.26 kg/m²     Intake/Output Summary (Last 24 hours) at 7/16/2020 1422  Last data filed at 7/16/2020 0900  Gross per 24 hour   Intake 600 ml   Output --   Net 600 ml      Wt Readings from Last 3 Encounters:   07/16/20 143 lb 15.4 oz (65.3 kg)   08/01/19 151 lb 1.6 oz (68.5 kg)   12/15/18 149 lb 14.6 oz (68 kg)       General appearance:  Appears comfortable  Eyes: Sclera clear. Pupils equal.  ENT: Moist oral mucosa. Trachea midline, no adenopathy. Cardiovascular: Regular rhythm, normal S1, S2. No murmur. No edema in lower extremities  Respiratory: Not using accessory muscles. Good inspiratory effort. Clear to auscultation bilaterally, no wheeze or crackles. GI: Abdomen soft, no tenderness, not distended, normal bowel sounds  Musculoskeletal: No cyanosis in digits, neck supple  Neurology: CN 2-12 grossly intact. No speech or motor deficits  Psych: Normal affect. Alert and oriented in time, place and person  Skin: Warm, dry, normal turgor    Labs and Tests:  CBC:   Recent Labs     07/14/20  0444 07/15/20  0430 07/16/20  0454   WBC 7.9 7.6 4.6   HGB 12.5* 12.7* 12.6*    343 381     BMP:    Recent Labs     07/14/20  0444 07/15/20  0430 07/16/20  0454   * 134* 134*   K 4.6 4.7 5.0   CL 99 96* 97*   CO2 23 24 25   BUN 5* 6* 12   CREATININE 0.7* 0.7* 0.9   GLUCOSE 106* 102* 141*     Hepatic:   Recent Labs     07/14/20  0444 07/15/20  0430 07/16/20  0454   AST 74* 77* 62*   * 138* 120*   BILITOT 0.4 0.4 <0.2   ALKPHOS 53 58 59       Discussed care with family and patient             Spent 30  minutes with patient and family at bedside and on unit reviewing medical records and labs, spent greater than 50% time counseling patient and family on diagnosis and plan   Problem List  Active Problems:    Alcohol withdrawal seizure, uncomplicated (HCC)    Delirium tremens ([facility] [address]  Resolved Problems:    * No resolved hospital problems. Connie Bolaños is a 54 y. o. male presents to the emergency department via [facility] emergency medical services after his son found him unresponsive and possibly having a [address] [address] [facility] [address] ED, patient was actively hallucinating and noted to be tremulous.  As per family, patient has been consuming alcohol on a regular basis in combination with cocaine.  Patient was alert and oriented in ED and able to follow commands.  Over the course of hospital stay, patient started getting more agitated and aggressive. [place] as per CIWA protocol and higher doses on 7/8 pm did not help patient with agitation.  He was started on phenobarbital IV scheduled dosing and transfer to [address] [address] ICU    Acute alcohol withdrawal delirium. Assessment & Plan:   1. Acute alcohol withdrawal delirium  -Needing phenobarbital weaned off. Patient is multifactorial but still confused at times.  -We will start discharge planning. With inpatient rehab versus home with home care he still significant weeks PT OT evaluated. -Was recommended skilled but patient does not want to go there    Diet: DIET GENERAL; Moderately Thick (Honey); No Drinking Straw  Code:Full Code  DVT PPX lovenox  Disposition plan for discharge tomorrow once is more clear. Stop on Librium.   Stop Maggie Greer MD   7/16/2020 2:22 PM

## 2020-07-16 NOTE — CARE COORDINATION
Discharge planning note:    [facility] and [facility] both acknowledged referrals and are reviewing clinicals. I am concerned by patients therapy scores that he may not qualify for programs.   He will need to be completely independent to go to an [facility] Marcello MADDEN BSN  Case Management  [phone]

## 2020-07-16 NOTE — PROGRESS NOTES
Physical Therapy    Facility/Department: [facility] ICU  Initial Assessment    NAME: Cee Dunham  : 1975  MRN: [id number]    Date of Service: 2020    Discharge Recommendations:  Cee Dunham scored a 14/24 on the AM-PAC short mobility form. Current research shows that an AM-PAC score of 17 or less is typically not associated with a discharge to the patient's home setting. Based on the patient's AM-PAC score and their current functional mobility deficits, it is recommended that the patient have 3-5 sessions per week of Physical Therapy at d/c to increase the patient's independence. Please see assessment section for further patient specific details. If continues to refuse non-home discharge, recommend 24 hour supervision and S3 level home care. HOME HEALTH CARE: LEVEL 3 SAFETY     - Initial home health evaluation to occur within 24-48 hours, in patient home   - Therapy evaluations in home within 24-48 hours of discharge; including DME and home safety   - Frontload therapy 5 days, then 3x a week   - Therapy to evaluate if patient has [address] needs for personal care   -  evaluation within 24-48 hours, includes evaluation of resources and insurance to determine AL, [place], LTC, and Medicaid options     If patient discharges prior to next session this note will serve as a discharge summary. Please see below for the latest assessment towards goals. 3-5 sessions per week   PT Equipment Recommendations  Equipment Needed: Yes  Mobility Devices: Genesis Harman: Rolling  Other: pt needs RW for safe ambulation if discharged home    Assessment   Body structures, Functions, Activity limitations: Decreased functional mobility ; Decreased endurance;Decreased safe awareness;Decreased balance  Assessment: Patient not at baseline function and would benefit from skilled PT to address above deficits and facilitate return to baseline function.  Patient at significant risk for falls and demonstrates poor insight to deficits and decreased safety awareness. Not safe to return to current home environment at this time  Treatment Diagnosis: decreased functional mobility, impaired gait, decreased balance  Prognosis: Fair  Decision Making: Medium Complexity  Clinical Presentation: evolving  PT Education: Goals;PT Role;Plan of Care  Patient Education: attempted to discuss d/c recommendations, patient became angry stating \"no one can tell me what to do\". Would benefit from reinforcement  Barriers to Learning: cognitive  REQUIRES PT FOLLOW UP: Yes  Activity Tolerance  Activity Tolerance: Patient Tolerated treatment well       Patient Diagnosis(es): The primary encounter diagnosis was Seizure ([facility] [address] A diagnosis of Alcohol withdrawal syndrome with complication [facility]) was also pertinent to this visit. has a past medical history of Alcohol withdrawal ([facility] [address] and Seizure ([facility] [address] has a past surgical history that includes Wrist surgery (Right). Restrictions  Restrictions/Precautions  Restrictions/Precautions: Seizure, Fall Risk(high fall risk)  Position Activity Restriction  Other position/activity restrictions: Lucy Fallon is a 54 y.o. male presents to the emergency department via [facility] emergency medical services where the mother contacted them because of finding her son unresponsive and possibly having a seizure. Patient has had difficulties with alcohol intoxication as well as alcohol withdrawal and states that he last was hospitalized for this back in November. He states at that time he had his second alcohol withdrawal seizure. Patient states that he has been drinking regularly.   Vision/Hearing  Vision: Impaired  Vision Exceptions: Wears glasses for reading  Hearing: Within functional limits     Subjective  General  Chart Reviewed: Yes  Family / Caregiver Present: No  Diagnosis: alcohol withdrawl  Follows Commands: Impaired  Other (Comment): increased cues needed to stay on task  General Comment  Comments: deficits)  Standing - Dynamic: Poor        Plan   Plan  Times per week: 3-5  Times per day: Daily  Current Treatment Recommendations: Strengthening, Balance Training, Functional Mobility Training, Transfer Training, Endurance Training, Gait Training, Stair training, Safety Education & Training, Patient/Caregiver Education & Training  Safety Devices  Type of devices: Call light within reach, Chair alarm in place, Nurse notified, Gait belt, Left in chair  Restraints  Initially in place: No      AM-PAC Score  AM-PAC Inpatient Mobility Raw Score : 14 (07/16/20 1329)  AM-PAC Inpatient T-Scale Score : 38.1 (07/16/20 1329)  Mobility Inpatient CMS 0-100% Score: 61.29 (07/16/20 1329)  Mobility Inpatient CMS G-Code Modifier : CL (07/16/20 1329)          Goals  Short term goals  Time Frame for Short term goals:  To be met prior to discharge  Short term goal 1: Bed mobility with supervision  Short term goal 2: Sit to/from stand with supervision  Short term goal 3: Ambulate 150 feet with AAD and CGA  Short term goal 4: [place] up/down 4 steps with rail and CGA  Patient Goals   Patient goals : to go home       Therapy Time   Individual Concurrent Group Co-treatment   Time In 1150         Time Out 1228         Minutes 38         Timed Code Treatment Minutes: [address], PT    Thanks, [place], PT, DPT [id number]

## 2020-07-17 VITALS
TEMPERATURE: 97.4 F | OXYGEN SATURATION: 99 % | RESPIRATION RATE: 13 BRPM | SYSTOLIC BLOOD PRESSURE: 112 MMHG | BODY MASS INDEX: 20.21 KG/M2 | DIASTOLIC BLOOD PRESSURE: 81 MMHG | HEIGHT: 69 IN | WEIGHT: 136.47 LBS | HEART RATE: 75 BPM

## 2020-07-17 LAB
A/G RATIO: 1 (ref 1.1–2.2)
ALBUMIN SERPL-MCNC: 4 G/DL (ref 3.4–5)
ALP BLD-CCNC: 57 U/L (ref 40–129)
ALT SERPL-CCNC: 127 U/L (ref 10–40)
ANION GAP SERPL CALCULATED.3IONS-SCNC: 11 MMOL/L (ref 3–16)
AST SERPL-CCNC: 71 U/L (ref 15–37)
BASOPHILS ABSOLUTE: 0.1 K/UL (ref 0–0.2)
BASOPHILS RELATIVE PERCENT: 1.3 %
BILIRUB SERPL-MCNC: <0.2 MG/DL (ref 0–1)
BUN BLDV-MCNC: 15 MG/DL (ref 7–20)
CALCIUM SERPL-MCNC: 10.5 MG/DL (ref 8.3–10.6)
CHLORIDE BLD-SCNC: 94 MMOL/L (ref 99–110)
CO2: 26 MMOL/L (ref 21–32)
CREAT SERPL-MCNC: 0.9 MG/DL (ref 0.9–1.3)
EOSINOPHILS ABSOLUTE: 0.1 K/UL (ref 0–0.6)
EOSINOPHILS RELATIVE PERCENT: 1.8 %
GFR AFRICAN AMERICAN: >60
GFR NON-AFRICAN AMERICAN: >60
GLOBULIN: 3.9 G/DL
GLUCOSE BLD-MCNC: 100 MG/DL (ref 70–99)
HCT VFR BLD CALC: 37.3 % (ref 40.5–52.5)
HEMOGLOBIN: 12.7 G/DL (ref 13.5–17.5)
LYMPHOCYTES ABSOLUTE: 2 K/UL (ref 1–5.1)
LYMPHOCYTES RELATIVE PERCENT: 37.5 %
MAGNESIUM: 1.8 MG/DL (ref 1.8–2.4)
MCH RBC QN AUTO: 34.5 PG (ref 26–34)
MCHC RBC AUTO-ENTMCNC: 34 G/DL (ref 31–36)
MCV RBC AUTO: 101.4 FL (ref 80–100)
MONOCYTES ABSOLUTE: 1.3 K/UL (ref 0–1.3)
MONOCYTES RELATIVE PERCENT: 23.1 %
NEUTROPHILS ABSOLUTE: 2 K/UL (ref 1.7–7.7)
NEUTROPHILS RELATIVE PERCENT: 36.3 %
PDW BLD-RTO: 12.9 % (ref 12.4–15.4)
PLATELET # BLD: 387 K/UL (ref 135–450)
PMV BLD AUTO: 7.6 FL (ref 5–10.5)
POTASSIUM SERPL-SCNC: 4.6 MMOL/L (ref 3.5–5.1)
RBC # BLD: 3.67 M/UL (ref 4.2–5.9)
SODIUM BLD-SCNC: 131 MMOL/L (ref 136–145)
TOTAL PROTEIN: 7.9 G/DL (ref 6.4–8.2)
WBC # BLD: 5.4 K/UL (ref 4–11)

## 2020-07-17 PROCEDURE — 92526 ORAL FUNCTION THERAPY: CPT

## 2020-07-17 PROCEDURE — 97129 THER IVNTJ 1ST 15 MIN: CPT

## 2020-07-17 PROCEDURE — 85025 COMPLETE CBC W/AUTO DIFF WBC: CPT

## 2020-07-17 PROCEDURE — 83735 ASSAY OF MAGNESIUM: CPT

## 2020-07-17 PROCEDURE — 6360000002 HC RX W HCPCS: Performed by: INTERNAL MEDICINE

## 2020-07-17 PROCEDURE — 2580000003 HC RX 258: Performed by: INTERNAL MEDICINE

## 2020-07-17 PROCEDURE — 80053 COMPREHEN METABOLIC PANEL: CPT

## 2020-07-17 PROCEDURE — 6370000000 HC RX 637 (ALT 250 FOR IP): Performed by: INTERNAL MEDICINE

## 2020-07-17 RX ORDER — FOLIC ACID 1 MG/1
1 TABLET ORAL DAILY
Qty: 30 TABLET | Refills: 3 | Status: SHIPPED | OUTPATIENT
Start: 2020-07-17 | End: 2020-09-11

## 2020-07-17 RX ORDER — MULTIVITAMIN WITH IRON
1 TABLET ORAL DAILY
Qty: 30 TABLET | Refills: 0 | Status: SHIPPED | OUTPATIENT
Start: 2020-07-17 | End: 2020-09-11

## 2020-07-17 RX ADMIN — MAGNESIUM GLUCONATE 500 MG ORAL TABLET 400 MG: 500 TABLET ORAL at 10:10

## 2020-07-17 RX ADMIN — Medication 10 ML: at 10:11

## 2020-07-17 RX ADMIN — FOLIC ACID 1 MG: 1 TABLET ORAL at 10:10

## 2020-07-17 RX ADMIN — POTASSIUM CHLORIDE 40 MEQ: 1500 TABLET, EXTENDED RELEASE ORAL at 10:10

## 2020-07-17 RX ADMIN — ENOXAPARIN SODIUM 40 MG: 40 INJECTION SUBCUTANEOUS at 10:10

## 2020-07-17 ASSESSMENT — PAIN SCALES - GENERAL: PAINLEVEL_OUTOF10: 0

## 2020-07-17 NOTE — CARE COORDINATION
Discharge planning note:  SNF Referrals sent to:    Home at [address]    CM will follow up with referrals later in the day.     De Edward RN BSN  Case Management

## 2020-07-17 NOTE — PLAN OF CARE
Nutrition Problem #1: Inadequate energy intake  Intervention: Food and/or Nutrient Delivery: Continue NPO  Nutritional Goals: advance nutrition per SLP vs start nutrition support
Nutrition Problem #1: Inadequate oral intake  Intervention: Food and/or Nutrient Delivery: Continue Current Diet  Nutritional Goals: New goal: Pt will consume at least 50% of meals on appropriate diet per SLP
Problem: Falls - Risk of:  Goal: Will remain free from falls  Description: Will remain free from falls  Outcome: Ongoing     Problem: Falls - Risk of:  Goal: Absence of physical injury  Description: Absence of physical injury  Outcome: Ongoing
Problem: Falls - Risk of:  Goal: Will remain free from falls  Description: Will remain free from falls  Outcome: Ongoing     Problem: Injury - Risk of, Physical Injury:  Goal: Will remain free from falls  Description: Will remain free from falls  Outcome: Ongoing     Problem: Fluid Volume - Deficit:  Goal: Absence of fluid volume deficit signs and symptoms  Description: Absence of fluid volume deficit signs and symptoms  Outcome: Ongoing     Problem: Cardiovascular  Goal: Hemodynamic stability  Outcome: Ongoing     Vitals:    07/08/20 2012   BP: (!) 169/95   Pulse: 98   Resp: 18   Temp: 99.7 °F (37.6 °C)   SpO2: 95%     Pt attempting to get OOB and bed alarm ringing. Pt incontinent urine. Bed pad & diaper changed; pt cleaned and repositioned back in bed. VSS with /95 at 2012 with CIWA 12; Ativan IV given. Labs (drawn at [facility] at shift change) called RN at 2011 with panic results for K and Mag. K 2. and Mag 1.3.  RN sent notes to pharmacy; d/w charge RN. IV Mag and KCl IVPB compatible per [employer]'s online reference Trissel's IV compatibility. IVPB infusions tubed to 3Tower per pharmacy. Started IV Mag at 2036 as secondary line with MIVF. Obtained additional IV channel and started IV KCl IVPB at 2039, connected at Y-site. See [facility] & all flowsheets. Pt is a high fall risk. Pt remains free from falls, throughout night. CAMERA at bedside for safety monitoring. Bed alarm remains in place, door open. Pt encouraged to use call light for needs throughout night; call light is within reach. Bed lock is in lowest position. Will continue to monitor throughout night.
Problem: Falls - Risk of:  Goal: Will remain free from falls  Description: Will remain free from falls  Outcome: Ongoing     Problem: Injury - Risk of, Physical Injury:  Goal: Will remain free from falls  Description: Will remain free from falls  Outcome: Ongoing     Problem: Fluid Volume - Deficit:  Goal: Absence of fluid volume deficit signs and symptoms  Description: Absence of fluid volume deficit signs and symptoms  Outcome: Ongoing     Problem: Cardiovascular  Goal: Hemodynamic stability  Outcome: Ongoing     Vitals:    07/09/20 0000   BP: (!) 160/84   Pulse: 99   Resp: 20   Temp: 99 °F (37.2 °C)   SpO2: 94%     Staff staying at bedside for patient safety d/t pt continual intermittent restlessness & pulling at gown, telemetry, & IV site in addition to attempting to get OOB without assistance at times. PCA and/or RN at bedside from 31 75 62 up to current time now. RN's [employer] phone rang and stimulated pt agitation. RN unable to obtain VS; pt swearing at RN - verbally & physically aggressive to RN. Pt removed gown & telemetry and pulling at lines. 4mg Ativan given IV at 2347 per [facility] protocol; see MAR & all flowsheets. Pt started to urinate in his brief; assisted pt with urinal then changed brief. Pt tolerated fairly well. Gown placed back on patient, telemetry replaced on patient with new electrodes, and VS obtained at 0000. Gave warm blanket and emptied urinal; see I/O. IV Magnesium completed & AM recheck ordered. IV electrolytes infusing per protocol; see orders & MAR. Pt is a high fall risk. Pt remains free from falls, throughout night. Camera at bedside. Bed alarm remains in place, door open. Pt encouraged to use call light for needs throughout night; call light is within reach. Bed lock is in lowest position. Will continue to monitor throughout night.
Problem: Falls - Risk of:  Goal: Will remain free from falls  Description: Will remain free from falls  Outcome: Ongoing     Problem: Injury - Risk of, Physical Injury:  Goal: Will remain free from falls  Description: Will remain free from falls  Outcome: Ongoing     Problem: Restraint Use - Nonviolent/Non-Self-Destructive Behavior:  Goal: Absence of restraint indications  Description: Absence of restraint indications  Outcome: Ongoing   Patient continues to be agitated and restless  and impulsive pulling on restraint,trying to escape, restraint remains in place , medication given per ciwa protocol , pt remains confused and  only oriented to self
Problem: Falls - Risk of:  Goal: Will remain free from falls  Description: Will remain free from falls  Outcome: Ongoing  Goal: Absence of physical injury  Description: Absence of physical injury  Outcome: Ongoing     Problem: Pain:  Goal: Pain level will decrease  Description: Pain level will decrease  Outcome: Ongoing  Goal: Control of acute pain  Description: Control of acute pain  Outcome: Ongoing  Goal: Control of chronic pain  Description: Control of chronic pain  Outcome: Ongoing
Problem: Falls - Risk of:  Goal: Will remain free from falls  Description: Will remain free from falls  Outcome: Ongoing  Note: Pt remains free from falls. Safety precautions in place. Restraints in place. Problem: Skin Integrity:  Intervention: Skin care  Note: Checking for incontinence, keeping skin clean and dry. Problem: Restraint Use - Nonviolent/Non-Self-Destructive Behavior:  Goal: Absence of restraint-related injury  Description: Absence of restraint-related injury  Note: Pt remains free from restraint related injury. Assessing per protocol.
Altered:  Goal: Absence of psychomotor disturbance signs and symptoms  Description: Absence of psychomotor disturbance signs and symptoms  Outcome: Ongoing     Problem: Sensory Perception - Impaired:  Goal: Demonstrations of improved sensory functioning will increase  Description: Demonstrations of improved sensory functioning will increase  Outcome: Ongoing  Goal: Decrease in sensory misperception frequency  Description: Decrease in sensory misperception frequency  Outcome: Ongoing  Goal: Able to refrain from responding to false sensory perceptions  Description: Able to refrain from responding to false sensory perceptions  Outcome: Ongoing  Goal: Demonstrates accurate environmental perceptions  Description: Demonstrates accurate environmental perceptions  Outcome: Ongoing  Goal: Able to distinguish between reality-based and nonreality-based thinking  Description: Able to distinguish between reality-based and nonreality-based thinking  Outcome: Ongoing  Goal: Able to interrupt nonreality-based thinking  Description: Able to interrupt nonreality-based thinking  Outcome: Ongoing     Problem: Sleep Pattern Disturbance:  Goal: Appears well-rested  Description: Appears well-rested  Outcome: Ongoing     Problem: Fluid Volume - Deficit:  Goal: Absence of fluid volume deficit signs and symptoms  Description: Absence of fluid volume deficit signs and symptoms  Outcome: Ongoing     Problem: Cardiovascular  Goal: Hemodynamic stability  Outcome: Ongoing     Problem: Restraint Use - Nonviolent/Non-Self-Destructive Behavior:  Goal: Absence of restraint indications  Description: Absence of restraint indications  7/14/2020 1113 by Lilly Olivo RN  Outcome: Ongoing  7/14/2020 0320 by Kirti Weems RN  Outcome: Ongoing  Goal: Absence of restraint-related injury  Description: Absence of restraint-related injury  Outcome: Ongoing     Problem: Nutrition  Goal: Optimal nutrition therapy  Outcome: Ongoing

## 2020-07-17 NOTE — CARE COORDINATION
Discharge planning note:    Patient has been accepted at [facility] for admission. Precert has been started. However, he cannot go until his COVID-19 test has results. I spoke with lab - his specimen was sent to [facility] which has been taking 4 - 5 days to come back.     Andrzej Winters RN BSN  Case Management  [phone]

## 2020-07-17 NOTE — PROGRESS NOTES
Shift assessment complete, see MAR for nightly medications given. CIWA score 1. Pt calm, cooperative, resting in bed. A&Ox4 but was incontinent of urine, brief changed. Safety precautions in place. Will continue to monitor.

## 2020-07-17 NOTE — DISCHARGE SUMMARY
Discharge Note:    Patient is not willing to stay in hospital and wait for SNF coordination. Application for [facility] completed by patient and faxed.  Anticipate they will be able to accept him in their Alcohol Rehab Program next week    Darian Miller orders faxed to:    [facility]  Phone [phone]  Fax [phone]    Maryam Alejandro RN BSN  Case Management  [phone]

## 2020-07-17 NOTE — PROGRESS NOTES
Reassessment complete, VSS, no acute changes. Pt resting comfortably in bed, denies pain, call light within reach.

## 2020-07-17 NOTE — PROGRESS NOTES
Pt remains calm, cooperative, pleasant throughout night. No PRN librium or ativan needed. VSS, no acute changes to reassessment. Will continue to monitor.

## 2020-07-18 NOTE — CARE COORDINATION
Chioma Rodriguez from [facility] called stating they are unable to accept pt. Faxed referral to [facility].   Electronically signed by CLEO Rojo on 7/18/2020 at 4:06 PM

## 2020-07-18 NOTE — PROGRESS NOTES
Pt given discharge instructions . Encouraged to not drink alcohol after discharge.     Discharged to home with mother per car,

## 2020-07-20 LAB — SARS-COV-2: NOT DETECTED

## 2020-07-23 NOTE — DISCHARGE SUMMARY
[facility] DISCHARGE SUMMARY    Patient Demographics    Patient. Aleida Gamez  Date of Birth. 1975  MRN. [id number]     Primary care provider. No primary care provider on file. (Tel: None)    Admit date: 7/7/2020    Discharge date (blank if same as Note Date): 7/17/2020  Note Date: 7/23/2020     Reason for Hospitalization. Chief Complaint   Patient presents with    Seizures     [facility] reported Mom found pt unresponsive and posbly having a seizure, pt smelled of alcohol, possble relapse, pt released from rehab back in November. [facility] Course. Alcohol withdrawal with delirium.-Was treated with phenobarbital protocol and was weaned off was transitioned to p.o. Librium. Patient was taken off Librium. Patient was recommended placement but refused patient was discharged home with outpatient follow-up with treatment plan      Consults. IP CONSULT TO HOSPITALIST  IP CONSULT TO SOCIAL WORK  IP CONSULT TO SOCIAL WORK  IP CONSULT TO HOME CARE NEEDS  IP CONSULT TO HOME CARE NEEDS    Physical examination on discharge day. /81   Pulse 75   Temp 97.4 °F (36.3 °C)   Resp 13   Ht 5' 9\" (1.753 m)   Wt 136 lb 7.4 oz (61.9 kg)   SpO2 99%   BMI 20.15 kg/m²   General appearance. Alert. Looks comfortable. HEENT. Sclera clear. Moist mucus membranes. Cardiovascular. Regular rate and rhythm, normal S1, S2. No murmur. Respiratory. Not using accessory muscles. Clear to auscultation bilaterally, no wheeze. Gastrointestinal. Abdomen soft, non-tender, not distended, normal bowel sounds  Neurology. Facial symmetry. No speech deficits. Moving all extremities equally. Extremities. No edema in lower extremities. Skin. Warm, dry, normal turgor    Condition at time of discharge stable     Medication instructions provided to patient at discharge.      Medication List      START

## 2020-09-11 ENCOUNTER — OFFICE VISIT (OUTPATIENT)
Dept: FAMILY MEDICINE CLINIC | Age: 45
End: 2020-09-11
Payer: MEDICARE

## 2020-09-11 VITALS
HEART RATE: 94 BPM | OXYGEN SATURATION: 98 % | DIASTOLIC BLOOD PRESSURE: 84 MMHG | BODY MASS INDEX: 23.36 KG/M2 | WEIGHT: 158.2 LBS | TEMPERATURE: 96.2 F | SYSTOLIC BLOOD PRESSURE: 120 MMHG

## 2020-09-11 PROCEDURE — 99386 PREV VISIT NEW AGE 40-64: CPT | Performed by: NURSE PRACTITIONER

## 2020-09-11 RX ORDER — LEVETIRACETAM 500 MG/1
500 TABLET ORAL 2 TIMES DAILY
Qty: 60 TABLET | Refills: 0 | Status: SHIPPED | OUTPATIENT
Start: 2020-09-11 | End: 2021-02-02 | Stop reason: CLARIF

## 2020-09-11 RX ORDER — CHLORDIAZEPOXIDE HYDROCHLORIDE 25 MG/1
25 CAPSULE, GELATIN COATED ORAL PRN
COMMUNITY
End: 2021-02-02 | Stop reason: CLARIF

## 2020-09-11 ASSESSMENT — PATIENT HEALTH QUESTIONNAIRE - PHQ9
SUM OF ALL RESPONSES TO PHQ QUESTIONS 1-9: 0
1. LITTLE INTEREST OR PLEASURE IN DOING THINGS: 0
2. FEELING DOWN, DEPRESSED OR HOPELESS: 0
SUM OF ALL RESPONSES TO PHQ QUESTIONS 1-9: 0
SUM OF ALL RESPONSES TO PHQ9 QUESTIONS 1 & 2: 0

## 2020-09-11 NOTE — PROGRESS NOTES
Aleida Gamez  : 1975  Encounter date: 2020    This megan 54 y.o. male who presents with  Chief Complaint   Patient presents with    New Patient       History of present illness:    HPI Pt is 54year old male to establish care. Pt with history of seizure disorder. Recent hospitalization for seizures and alchohol withdrawal.  History and Physical      Aleida Gamez  YOB: 1975    Date of Service:  2020    Chief Complaint:   Aleida Gamez is a 54 y.o. male who  presents for physical examination. HPI: Pt is 54year old male for annual exam.  Pt taking keppra 500 mg BID for seizures, librium as needed for tremors.     Wt Readings from Last 3 Encounters:   20 158 lb 3.2 oz (71.8 kg)   20 136 lb 7.4 oz (61.9 kg)   19 151 lb 1.6 oz (68.5 kg)     BP Readings from Last 3 Encounters:   20 120/84   20 112/81   19 (!) 144/83       Patient Active Problem List   Diagnosis    Closed fracture of left ankle with routine healing    Alcohol withdrawal seizure without complication (HCC)    Alcohol withdrawal syndrome with complication ([address]    Alcohol dependence ([address]    Seizure ([address]    Acute metabolic encephalopathy    Hypokalemia    Electrolyte disorder    Seizure-like activity ([address]    Alcoholic encephalopathy ([address]    Alcohol withdrawal seizure, uncomplicated ([address]    Delirium tremens ([address]       Preventive Care:  Health Maintenance   Topic Date Due    Pneumococcal 0-64 years Vaccine (1 of 1 - PPSV23) 1980    HIV screen  1989    Lipid screen  2014    Flu vaccine (1) 2020    DTaP/Tdap/Td vaccine (2 - Td) 2029    Hepatitis A vaccine  Aged Out    Hepatitis B vaccine  Aged Out    Hib vaccine  Aged Out    Meningococcal (ACWY) vaccine  Aged Out      Self-testicular exams: Yes  Sexual activity: none   Last eye exam: NA, recommended  Exercise: none    Lipid panel:  No results found for: CHOL, TRIG, HDL, LDLCALC, LDLDIRECT    Living will : No    Immunization History   Administered Date(s) Administered    Influenza, MDCK Quadv, IM, PF (Flucelvax 4 yrs and older) 10/17/2017    Influenza, Redgie Wagner, 6 mo and older, IM, PF (Flulaval, Fluarix) 12/12/2018       Allergies   Allergen Reactions    Other      Pain medication that starts with an M patient doesn't remember it was a pill not IV form unknown name    Seasonal      Outpatient Medications Marked as Taking for the 9/11/20 encounter (Office Visit) with MULUGETA Velasco - NP   Medication Sig Dispense Refill    chlordiazePOXIDE (LIBRIUM) 25 MG capsule Take 25 mg by mouth as needed.  levETIRAcetam (KEPPRA) 500 MG tablet Take 1 tablet by mouth 2 times daily 60 tablet 0       Past Medical History:   Diagnosis Date    Alcohol withdrawal ([facility] 10/12/2017    Seizure ([facility]      Past Surgical History:   Procedure Laterality Date    WRIST SURGERY Right      Family History   Problem Relation Age of Onset    Cancer Mother     Cancer Father      Social History     Socioeconomic History    Marital status:      Spouse name: Not on file    Number of children: Not on file    Years of education: Not on file    Highest education level: Not on file   Occupational History    Not on file   Social Needs    Financial resource strain: Not on file    Food insecurity     Worry: Not on file     Inability: Not on file    Transportation needs     Medical: Not on file     Non-medical: Not on file   Tobacco Use    Smoking status: Current Every Day Smoker     Packs/day: 0.50     Years: 31.00     Pack years: 15.50    Smokeless tobacco: Never Used   Substance and Sexual Activity    Alcohol use: Yes     Comment: Drinks 10-15 every day.  Drug use: Yes     Types: Marijuana     Comment:  Occ    Sexual activity: Yes     Partners: Female   Lifestyle    Physical activity     Days per week: Not on file     Minutes per session: Not on file    Stress: Not on file Relationships    Social connections     Talks on phone: Not on file     Gets together: Not on file     Attends Advent service: Not on file     Active member of club or organization: Not on file     Attends meetings of clubs or organizations: Not on file     Relationship status: Not on file    Intimate partner violence     Fear of current or ex partner: Not on file     Emotionally abused: Not on file     Physically abused: Not on file     Forced sexual activity: Not on file   Other Topics Concern    Not on file   Social History Narrative    Not on file       Review of Systems:  A comprehensive review of systems was negative. Physical Exam:   Vitals:    09/11/20 1301   BP: 120/84   Site: Right Upper Arm   Position: Sitting   Cuff Size: Medium Adult   Pulse: 94   Temp: 96.2 °F (35.7 °C)   SpO2: 98%   Weight: 158 lb 3.2 oz (71.8 kg)     Body mass index is 23.36 kg/m². Constitutional: He is oriented to person, place, and time. He appears well-developed and well-nourished. No distress. HEENT:   Head: Normocephalic and atraumatic. Right Ear: Tympanic membrane, external ear and ear canal normal.   Left Ear: Tympanic membrane, external ear and ear canal normal.   Nose: Nose normal.   Mouth/Throat: Oropharynx is clear and moist and mucous membranes are normal. No oropharyngeal exudate or posterior oropharyngeal erythema. He has no cervical adenopathy. Eyes: Conjunctivae and extraocular motions are normal. Pupils are equal, round, and reactive to light. Neck: Full passive range of motion without pain. Neck supple. No JVD present. Carotid bruit is not present. No mass and no thyromegaly present. Cardiovascular: Normal rate, regular rhythm, normal heart sounds and intact distal pulses. Exam reveals no gallop and no friction rub. No murmur heard. Pulmonary/Chest: Effort normal and breath sounds normal. No respiratory distress. He has no wheezes, rhonchi or rales. Abdominal: Soft, non-tender.  Bowel sounds and aorta are normal. There is no organomegaly, mass or bruit. Genitourinary:  rectal not indicated by age criteria and lack of symptoms. Musculoskeletal: Normal range of motion, no synovitis. He exhibits no edema. Neurological: He is alert and oriented to person, place, and time. He has normal reflexes. No cranial nerve deficit. Coordination normal.   Skin: Skin is warm, dry and intact. No suspicious lesions are noted. Psychiatric: He has a normal mood and affect. His speech is normal and behavior is normal. Judgment, cognition and memory are normal.     Assessment/Plan:    Mary Green was seen today for new patient. Diagnoses and all orders for this visit:    Encounter for preventative adult health care examination  -     Lipid, Fasting; Future    Seizure disorder (HCC)  -     levETIRAcetam (KEPPRA) 500 MG tablet; Take 1 tablet by mouth 2 times daily    Screening for thyroid disorder  -     TSH with Reflex; Future    Hyperglycemia  -     Hemoglobin A1C; Future              Current Outpatient Medications on File Prior to Visit   Medication Sig Dispense Refill    chlordiazePOXIDE (LIBRIUM) 25 MG capsule Take 25 mg by mouth as needed. No current facility-administered medications on file prior to visit. Allergies   Allergen Reactions    Other      Pain medication that starts with an M patient doesn't remember it was a pill not IV form unknown name    Seasonal      Past Medical History:   Diagnosis Date    Alcohol withdrawal ([facility] 10/12/2017    Seizure ([facility]       Past Surgical History:   Procedure Laterality Date    WRIST SURGERY Right       Family History   Problem Relation Age of Onset    Cancer Mother     Cancer Father       Social History     Tobacco Use    Smoking status: Current Every Day Smoker     Packs/day: 0.50     Years: 31.00     Pack years: 15.50    Smokeless tobacco: Never Used   Substance Use Topics    Alcohol use: Yes     Comment: Drinks 10-15 every day.         Review of Systems   Allergic/Immunologic: Negative for immunocompromised state. Neurological: Positive for tremors and seizures. Negative for dizziness, weakness and headaches. Hematological: Does not bruise/bleed easily. Psychiatric/Behavioral: Negative for dysphoric mood and sleep disturbance. The patient is not nervous/anxious. Objective:    /84 (Site: Right Upper Arm, Position: Sitting, Cuff Size: Medium Adult)   Pulse 94   Temp 96.2 °F (35.7 °C)   Wt 158 lb 3.2 oz (71.8 kg)   SpO2 98%   BMI 23.36 kg/m²   Weight: 158 lb 3.2 oz (71.8 kg)     BP Readings from Last 3 Encounters:   09/11/20 120/84   07/17/20 112/81   08/01/19 (!) 144/83     Wt Readings from Last 3 Encounters:   09/11/20 158 lb 3.2 oz (71.8 kg)   07/17/20 136 lb 7.4 oz (61.9 kg)   08/01/19 151 lb 1.6 oz (68.5 kg)     BMI Readings from Last 3 Encounters:   09/11/20 23.36 kg/m²   07/17/20 20.15 kg/m²   08/01/19 22.38 kg/m²       Physical Exam    Assessment/Plan    1. Encounter for preventative adult health care examination  Advised influenza vaccination  Advised routine dental  Advised Living will  Discussed alcohol and smoking cessation  - Lipid, Fasting; Future    2. Seizure disorder ([facility]  Refill sent  Discussed preventative vs abortive medications  Will check Keppra levels once pt has been taking medication routinely  - levETIRAcetam (KEPPRA) 500 MG tablet; Take 1 tablet by mouth 2 times daily  Dispense: 60 tablet; Refill: 0    3. Screening for thyroid disorder  - TSH with Reflex; Future    4. Hyperglycemia  - Hemoglobin A1C; Future      Return in about 6 months (around 3/11/2021) for medication re-check. This dictation was generated by voice recognition computer software. Although all attempts are made to edit the dictation for accuracy, there may be errors in the transcription that are not intended.

## 2021-01-01 ENCOUNTER — APPOINTMENT (OUTPATIENT)
Dept: CT IMAGING | Age: 46
End: 2021-01-01
Payer: MEDICARE

## 2021-01-01 ENCOUNTER — HOSPITAL ENCOUNTER (EMERGENCY)
Age: 46
Discharge: LEFT BEFORE DISCHARGE | End: 2021-10-11
Attending: EMERGENCY MEDICINE
Payer: MEDICARE

## 2021-01-01 VITALS
OXYGEN SATURATION: 98 % | SYSTOLIC BLOOD PRESSURE: 166 MMHG | BODY MASS INDEX: 21.77 KG/M2 | HEART RATE: 124 BPM | TEMPERATURE: 99 F | HEIGHT: 69 IN | DIASTOLIC BLOOD PRESSURE: 110 MMHG | WEIGHT: 147 LBS | RESPIRATION RATE: 16 BRPM

## 2021-01-01 DIAGNOSIS — Z53.21 ELOPED FROM EMERGENCY DEPARTMENT: Primary | ICD-10-CM

## 2021-01-01 LAB
BASOPHILS ABSOLUTE: 0 K/UL (ref 0–0.2)
BASOPHILS RELATIVE PERCENT: 0.4 %
EOSINOPHILS ABSOLUTE: 0 K/UL (ref 0–0.6)
EOSINOPHILS RELATIVE PERCENT: 0.5 %
FOLATE: >20 NG/ML (ref 4.78–24.2)
HCT VFR BLD CALC: 37.5 % (ref 40.5–52.5)
HEMOGLOBIN: 13 G/DL (ref 13.5–17.5)
LACTIC ACID, SEPSIS: 2.3 MMOL/L (ref 0.4–1.9)
LYMPHOCYTES ABSOLUTE: 1.6 K/UL (ref 1–5.1)
LYMPHOCYTES RELATIVE PERCENT: 23 %
MCH RBC QN AUTO: 35.1 PG (ref 26–34)
MCHC RBC AUTO-ENTMCNC: 34.6 G/DL (ref 31–36)
MCV RBC AUTO: 101.5 FL (ref 80–100)
MONOCYTES ABSOLUTE: 0.9 K/UL (ref 0–1.3)
MONOCYTES RELATIVE PERCENT: 13.3 %
NEUTROPHILS ABSOLUTE: 4.4 K/UL (ref 1.7–7.7)
NEUTROPHILS RELATIVE PERCENT: 62.8 %
PDW BLD-RTO: 13.8 % (ref 12.4–15.4)
PLATELET # BLD: 237 K/UL (ref 135–450)
PMV BLD AUTO: 8.2 FL (ref 5–10.5)
RBC # BLD: 3.7 M/UL (ref 4.2–5.9)
REASON FOR REJECTION: NORMAL
REJECTED TEST: NORMAL
VITAMIN B-12: 1055 PG/ML (ref 211–911)
VITAMIN B1, PLASMA: <2 NMOL/L (ref 4–15)
WBC # BLD: 7 K/UL (ref 4–11)

## 2021-01-01 PROCEDURE — 99283 EMERGENCY DEPT VISIT LOW MDM: CPT

## 2021-01-01 PROCEDURE — 83605 ASSAY OF LACTIC ACID: CPT

## 2021-01-01 PROCEDURE — 36415 COLL VENOUS BLD VENIPUNCTURE: CPT

## 2021-01-01 PROCEDURE — 85025 COMPLETE CBC W/AUTO DIFF WBC: CPT

## 2021-01-01 PROCEDURE — 82607 VITAMIN B-12: CPT

## 2021-01-01 PROCEDURE — 84425 ASSAY OF VITAMIN B-1: CPT

## 2021-01-01 PROCEDURE — 82746 ASSAY OF FOLIC ACID SERUM: CPT

## 2021-01-01 RX ORDER — LORAZEPAM 2 MG/ML
2 INJECTION INTRAMUSCULAR ONCE
Status: DISCONTINUED | OUTPATIENT
Start: 2021-01-01 | End: 2021-01-01 | Stop reason: HOSPADM

## 2021-01-01 RX ORDER — ONDANSETRON 2 MG/ML
4 INJECTION INTRAMUSCULAR; INTRAVENOUS EVERY 6 HOURS PRN
Status: DISCONTINUED | OUTPATIENT
Start: 2021-01-01 | End: 2021-01-01 | Stop reason: HOSPADM

## 2021-01-01 RX ORDER — 0.9 % SODIUM CHLORIDE 0.9 %
1000 INTRAVENOUS SOLUTION INTRAVENOUS ONCE
Status: DISCONTINUED | OUTPATIENT
Start: 2021-01-01 | End: 2021-01-01 | Stop reason: HOSPADM

## 2021-01-01 ASSESSMENT — ENCOUNTER SYMPTOMS
VOMITING: 0
NAUSEA: 0
SHORTNESS OF BREATH: 0
ABDOMINAL PAIN: 0

## 2021-01-01 ASSESSMENT — PAIN SCALES - GENERAL: PAINLEVEL_OUTOF10: 9

## 2021-01-26 ENCOUNTER — TELEPHONE (OUTPATIENT)
Dept: FAMILY MEDICINE CLINIC | Age: 46
End: 2021-01-26

## 2021-01-26 NOTE — TELEPHONE ENCOUNTER
Patient arrived late to his appt and was not able to be seen. He would like to know if he could reschedule it to another day and do it as a VV.       Please advise

## 2021-02-02 ENCOUNTER — OFFICE VISIT (OUTPATIENT)
Dept: FAMILY MEDICINE CLINIC | Age: 46
End: 2021-02-02
Payer: MEDICARE

## 2021-02-02 VITALS
SYSTOLIC BLOOD PRESSURE: 162 MMHG | DIASTOLIC BLOOD PRESSURE: 100 MMHG | HEART RATE: 106 BPM | BODY MASS INDEX: 24.73 KG/M2 | WEIGHT: 167 LBS | HEIGHT: 69 IN | OXYGEN SATURATION: 98 % | TEMPERATURE: 95.7 F

## 2021-02-02 DIAGNOSIS — I10 ESSENTIAL HYPERTENSION: Primary | ICD-10-CM

## 2021-02-02 DIAGNOSIS — G40.909 SEIZURE DISORDER (HCC): ICD-10-CM

## 2021-02-02 DIAGNOSIS — L72.3 SEBACEOUS CYST: ICD-10-CM

## 2021-02-02 DIAGNOSIS — R73.9 HYPERGLYCEMIA: ICD-10-CM

## 2021-02-02 DIAGNOSIS — N52.9 ERECTILE DYSFUNCTION, UNSPECIFIED ERECTILE DYSFUNCTION TYPE: ICD-10-CM

## 2021-02-02 PROCEDURE — 4004F PT TOBACCO SCREEN RCVD TLK: CPT | Performed by: NURSE PRACTITIONER

## 2021-02-02 PROCEDURE — G8484 FLU IMMUNIZE NO ADMIN: HCPCS | Performed by: NURSE PRACTITIONER

## 2021-02-02 PROCEDURE — G8427 DOCREV CUR MEDS BY ELIG CLIN: HCPCS | Performed by: NURSE PRACTITIONER

## 2021-02-02 PROCEDURE — 99214 OFFICE O/P EST MOD 30 MIN: CPT | Performed by: NURSE PRACTITIONER

## 2021-02-02 PROCEDURE — G8420 CALC BMI NORM PARAMETERS: HCPCS | Performed by: NURSE PRACTITIONER

## 2021-02-02 RX ORDER — LISINOPRIL 10 MG/1
10 TABLET ORAL DAILY
Qty: 30 TABLET | Refills: 0 | Status: SHIPPED | OUTPATIENT
Start: 2021-02-02 | End: 2021-03-01

## 2021-02-02 RX ORDER — BLOOD PRESSURE TEST KIT
1 KIT MISCELLANEOUS DAILY
Qty: 1 KIT | Refills: 0 | Status: SHIPPED | OUTPATIENT
Start: 2021-02-02

## 2021-02-02 RX ORDER — LEVETIRACETAM 500 MG/1
500 TABLET ORAL 2 TIMES DAILY
Qty: 60 TABLET | Refills: 2 | Status: SHIPPED | OUTPATIENT
Start: 2021-02-02 | End: 2021-05-09

## 2021-02-02 ASSESSMENT — ENCOUNTER SYMPTOMS
SHORTNESS OF BREATH: 0
DIARRHEA: 0
NAUSEA: 0
CONSTIPATION: 0

## 2021-02-02 ASSESSMENT — PATIENT HEALTH QUESTIONNAIRE - PHQ9
2. FEELING DOWN, DEPRESSED OR HOPELESS: 0
SUM OF ALL RESPONSES TO PHQ QUESTIONS 1-9: 0
SUM OF ALL RESPONSES TO PHQ QUESTIONS 1-9: 0

## 2021-02-02 NOTE — PROGRESS NOTES
Respiratory: Negative for shortness of breath. Cardiovascular: Negative for chest pain, palpitations and leg swelling. Gastrointestinal: Negative for constipation, diarrhea and nausea. Genitourinary: Negative for decreased urine volume, difficulty urinating, dysuria, frequency, penile pain, penile swelling, testicular pain and urgency. Neurological: Positive for seizures. Negative for dizziness, light-headedness and headaches. Hematological: Does not bruise/bleed easily. Psychiatric/Behavioral: Negative for dysphoric mood and sleep disturbance. The patient is not nervous/anxious. Objective:    BP (!) 162/100   Pulse 106   Temp 95.7 °F (35.4 °C) (Temporal)   Ht 5' 9\" (1.753 m)   Wt 167 lb (75.8 kg)   SpO2 98%   BMI 24.66 kg/m²   Weight: 167 lb (75.8 kg)     BP Readings from Last 3 Encounters:   02/02/21 (!) 162/100   09/11/20 120/84   07/17/20 112/81     Wt Readings from Last 3 Encounters:   02/02/21 167 lb (75.8 kg)   09/11/20 158 lb 3.2 oz (71.8 kg)   07/17/20 136 lb 7.4 oz (61.9 kg)     BMI Readings from Last 3 Encounters:   02/02/21 24.66 kg/m²   09/11/20 23.36 kg/m²   07/17/20 20.15 kg/m²       Physical Exam  Vitals signs reviewed. Constitutional:       Appearance: Normal appearance. He is well-developed and normal weight. Eyes:      Extraocular Movements: Extraocular movements intact. Pupils: Pupils are equal, round, and reactive to light. Neck:      Musculoskeletal: Neck supple. No muscular tenderness. Cardiovascular:      Rate and Rhythm: Normal rate and regular rhythm. Heart sounds: Normal heart sounds. No murmur. Pulmonary:      Effort: Pulmonary effort is normal.      Breath sounds: Normal breath sounds. Abdominal:      General: Bowel sounds are normal.      Palpations: Abdomen is soft. Musculoskeletal:      Right lower leg: No edema. Left lower leg: No edema. Lymphadenopathy:      Cervical: No cervical adenopathy.    Skin:     General: Skin is warm and dry. Neurological:      Mental Status: He is alert and oriented to person, place, and time. Psychiatric:         Mood and Affect: Mood normal.         Assessment/Plan    1. Essential hypertension  Advised routine monitoring  Advised low salt diet  - CBC Auto Differential; Future  - Comprehensive Metabolic Panel; Future  - TSH with Reflex; Future  - lisinopril (PRINIVIL;ZESTRIL) 10 MG tablet; Take 1 tablet by mouth daily  Dispense: 30 tablet; Refill: 0  - Blood Pressure KIT; 1 kit by Does not apply route daily  Dispense: 1 kit; Refill: 0    2. Erectile dysfunction, unspecified erectile dysfunction type  Discussed that many components can affect ED including uncontrolled hypertension, low libido, prostate concerns, ETOH  - Testosterone; Future  - Microalbumin / Creatinine Urine Ratio; Future  - Freida Lizarraga MD, [facility], [facility]  - Psa screening; Future    3. Hyperglycemia  - Hemoglobin A1C; Future    4. Seizure disorder ([facility] [address]  Refill sent  Advised to take daily  - levETIRAcetam (KEPPRA) 500 MG tablet; Take 1 tablet by mouth 2 times daily  Dispense: 60 tablet; Refill: 2    5. Sebaceous cyst  Referral sent  - Nisha Santos MD, General Surgery, [facility]      Return in about 1 month (around 3/2/2021) for medication re-check, hypertension re-check, lab review. This dictation was generated by voice recognition computer software. Although all attempts are made to edit the dictation for accuracy, there may be errors in the transcription that are not intended.

## 2021-02-12 ENCOUNTER — OFFICE VISIT (OUTPATIENT)
Dept: SURGERY | Age: 46
End: 2021-02-12
Payer: MEDICARE

## 2021-02-12 VITALS — TEMPERATURE: 97.1 F | WEIGHT: 164 LBS | BODY MASS INDEX: 24.22 KG/M2

## 2021-02-12 DIAGNOSIS — L72.3 SEBACEOUS CYST: Primary | ICD-10-CM

## 2021-02-12 PROCEDURE — 99241 PR OFFICE CONSULTATION NEW/ESTAB PATIENT 15 MIN: CPT | Performed by: SURGERY

## 2021-02-12 PROCEDURE — G8420 CALC BMI NORM PARAMETERS: HCPCS | Performed by: SURGERY

## 2021-02-12 PROCEDURE — G8484 FLU IMMUNIZE NO ADMIN: HCPCS | Performed by: SURGERY

## 2021-02-12 PROCEDURE — G8427 DOCREV CUR MEDS BY ELIG CLIN: HCPCS | Performed by: SURGERY

## 2021-02-12 ASSESSMENT — ENCOUNTER SYMPTOMS
RESPIRATORY NEGATIVE: 1
EYES NEGATIVE: 1
ALLERGIC/IMMUNOLOGIC NEGATIVE: 1
GASTROINTESTINAL NEGATIVE: 1

## 2021-02-12 NOTE — PROGRESS NOTES
:     Huey Cerda is a 51 y.o. male     CC: cyst of the scalp    HPI: 44-year-old male who presents for evaluation of cyst of the scalp which of the present for about 1 year. Both of the cysts have been steadily increasing in size. They do cause localized discomfort. Past Medical History:   Diagnosis Date    Alcohol withdrawal ([facility] [address] 10/12/2017    Seizure ([facility]        Other and Seasonal     Past Surgical History:   Procedure Laterality Date    WRIST SURGERY Right         Prior to Visit Medications    Medication Sig Taking? Authorizing Provider   levETIRAcetam (KEPPRA) 500 MG tablet Take 1 tablet by mouth 2 times daily Yes MULUGETA Yip NP   lisinopril (PRINIVIL;ZESTRIL) 10 MG tablet Take 1 tablet by mouth daily Yes MULUGETA Yip NP   Blood Pressure KIT 1 kit by Does not apply route daily Yes MULUGETA Yip NP       Social History     Socioeconomic History    Marital status:      Spouse name: Not on file    Number of children: Not on file    Years of education: Not on file    Highest education level: Not on file   Occupational History    Not on file   Social Needs    Financial resource strain: Not on file    Food insecurity     Worry: Not on file     Inability: Not on file    Transportation needs     Medical: Not on file     Non-medical: Not on file   Tobacco Use    Smoking status: Current Every Day Smoker     Packs/day: 0.50     Years: 31.00     Pack years: 15.50    Smokeless tobacco: Never Used   Substance and Sexual Activity    Alcohol use: Yes     Comment: Drinks 10-15 every day.  Drug use: Yes     Types: Marijuana     Comment:  Occ    Sexual activity: Yes     Partners: Female   Lifestyle    Physical activity     Days per week: Not on file     Minutes per session: Not on file    Stress: Not on file   Relationships    Social connections     Talks on phone: Not on file     Gets together: Not on file     Attends Anabaptism service: Not on file     Active member of club or organization: Not on file     Attends meetings of clubs or organizations: Not on file     Relationship status: Not on file    Intimate partner violence     Fear of current or ex partner: Not on file     Emotionally abused: Not on file     Physically abused: Not on file     Forced sexual activity: Not on file   Other Topics Concern    Not on file   Social History Narrative    Not on file       Review of Systems   Constitutional: Negative. HENT: Negative. Eyes: Negative. Respiratory: Negative. Cardiovascular: Negative. Gastrointestinal: Negative. Endocrine: Negative. Genitourinary: Negative. Musculoskeletal: Negative. Skin: Negative. Allergic/Immunologic: Negative. Neurological: Negative. Hematological: Negative. Psychiatric/Behavioral: Negative.        :   Physical Exam  Constitutional:       Appearance: He is well-developed. HENT:      Head: Normocephalic and atraumatic. Right Ear: External ear normal.      Left Ear: External ear normal.   Eyes:      Conjunctiva/sclera: Conjunctivae normal.   Neck:      Musculoskeletal: Normal range of motion and neck supple. Musculoskeletal: Normal range of motion. Skin:     General: Skin is warm and dry. Neurological:      Mental Status: He is alert and oriented to person, place, and time. Psychiatric:         Behavior: Behavior normal.     The patient has two 15 mm sebaceous cysts across the upper forehead  Temp 97.1 °F (36.2 °C)   Wt 164 lb (74.4 kg)   BMI 24.22 kg/m²     :      50-year-old male with sebaceous cysts x2 of the frontal region of the scalp which have been present for about 1 year. Both of the cysts have been slowly increasing in size and cause localized discomfort.   Physical examination reveals approximately 15 mm sebaceous cysts x2 along the upper forehead      Plan:      Excision of sebaceous cysts x2 of the upper forehead under local anesthetic in the office.

## 2021-02-12 NOTE — LETTER
[address]  [address] [address]  Phone: [phone]  Fax: [phone]    February 12, 2021    Patient: Guicho Rich  MRN:  [id number]  YOB: 1975  Date of Visit: 2/12/2021    Dear Urbano Longoria,    Thank you for the request for consultation for Afsaneh Aracelis. Below are the relevant portions of my assessment and plan of care. Assessment:  79-year-old male with sebaceous cysts x2 of the frontal region of the scalp which have been present for about 1 year. Both of the cysts have been slowly increasing in size and cause localized discomfort. Physical examination reveals approximately 15 mm sebaceous cysts x2 along the upper forehead      Plan:  Excision of sebaceous cysts x2 of the upper forehead under local anesthetic in the office. If you have questions, please do not hesitate to call me. I look forward to following Domenic Carvajal along with you.     Sincerely,    Namita Reyes MD     providers:    MULUGETA De Oliveira NP  [address] [place] [id number]  [address]

## 2021-03-01 DIAGNOSIS — I10 ESSENTIAL HYPERTENSION: ICD-10-CM

## 2021-03-01 RX ORDER — LISINOPRIL 10 MG/1
TABLET ORAL
Qty: 30 TABLET | Refills: 0 | Status: SHIPPED | OUTPATIENT
Start: 2021-03-01 | End: 2021-03-29

## 2021-03-03 ENCOUNTER — TELEPHONE (OUTPATIENT)
Dept: FAMILY MEDICINE CLINIC | Age: 46
End: 2021-03-03

## 2021-03-03 NOTE — TELEPHONE ENCOUNTER
----- Message from Yevonne Gitelman sent at 3/3/2021  8:51 AM EST -----  Subject: Message to Provider    QUESTIONS  Information for Provider? Patient needs a note to return to work because   he called in sick. ---------------------------------------------------------------------------  --------------  Maribel KWONG  What is the best way for the office to contact you? OK to leave message on   voicemail  Preferred Call Back Phone Number? [phone]  ---------------------------------------------------------------------------  --------------  SCRIPT ANSWERS  Relationship to Patient?  Self

## 2021-03-18 ENCOUNTER — PROCEDURE VISIT (OUTPATIENT)
Dept: SURGERY | Age: 46
End: 2021-03-18
Payer: MEDICARE

## 2021-03-18 VITALS
TEMPERATURE: 96.9 F | DIASTOLIC BLOOD PRESSURE: 70 MMHG | BODY MASS INDEX: 24.51 KG/M2 | WEIGHT: 166 LBS | SYSTOLIC BLOOD PRESSURE: 110 MMHG

## 2021-03-18 DIAGNOSIS — L72.3 SEBACEOUS CYST: Primary | ICD-10-CM

## 2021-03-18 PROCEDURE — 11442 EXC FACE-MM B9+MARG 1.1-2 CM: CPT | Performed by: SURGERY

## 2021-03-18 PROCEDURE — 12052 INTMD RPR FACE/MM 2.6-5.0 CM: CPT | Performed by: SURGERY

## 2021-03-18 NOTE — PROGRESS NOTES
Pre op Dx-tender sebaceous cyst of the forehead    Post op Dx-same    Procedure-Excision of 12 mm sebaceous cyst of the right upper forehead with intermediate repair (1.5 cm), excision of 14 mm sebaceous cyst left upper forehead with intermediate repair (1.8 cm)    Surgeon-Dr. Daniel Del Valle    Anesthesia-local    EBL-min    Operative indications and consent-51year-old male with tender sebaceous cyst of the forehead. He is brought in today for excision of both cysts. Patient explained risks,benefits, complications and alternatives. Procedure-Patient prepped and draped in the usual sterile fashion. Supine position. The skin and subcutaneous tissues were infiltrated with 1% lidocaine with epinephrine. A 12 mm sebaceous cyst of the right upper forehead was excised. The subcutaneous layer and superficial fascia was closed with 4-0 vicryl followed by a running 5-0 nylon suture in the skin. PSO was applied. The patient was then prepped and draped in usual sterile fashion. The skin and subcutaneous tissue were injected with 1% lidocaine with epinephrine. A 14 mm sebaceous cyst of the left upper forehead was excised. The subcutaneous layer and superficial fascia was closed with 4-0 Vicryl sutures followed by running 5-0 nylon suture in the skin. PSO was applied at this site. The patient tolerated the procedure well.

## 2021-03-22 ENCOUNTER — TELEPHONE (OUTPATIENT)
Dept: FAMILY MEDICINE CLINIC | Age: 46
End: 2021-03-22

## 2021-03-22 NOTE — TELEPHONE ENCOUNTER
----- Message from Dipti Victor sent at 3/22/2021 11:52 AM EDT -----  Subject: Message to Provider    QUESTIONS  Information for Provider? pt has to have sutures removed. SF made me send   message for this. Please reach out to pt.  ---------------------------------------------------------------------------  --------------  CALL BACK INFO  What is the best way for the office to contact you? OK to leave message on   voicemail  Preferred Call Back Phone Number? [phone]  ---------------------------------------------------------------------------  --------------  SCRIPT ANSWERS  Relationship to Patient? Self  Appointment reason? Symptomatic  Select script based on patient symptoms? Adult No Script   (Is the patient requesting to see the provider for a procedure?)?  Yes

## 2021-03-24 NOTE — TELEPHONE ENCOUNTER
I called and LVM for pt to call back, when pt calls he can be scheduled to have the sutures removed at the office. If pt has had sutures removed already just note in chart and sign encounter.

## 2021-03-25 ENCOUNTER — OFFICE VISIT (OUTPATIENT)
Dept: FAMILY MEDICINE CLINIC | Age: 46
End: 2021-03-25
Payer: MEDICARE

## 2021-03-25 VITALS
SYSTOLIC BLOOD PRESSURE: 120 MMHG | DIASTOLIC BLOOD PRESSURE: 76 MMHG | HEART RATE: 89 BPM | BODY MASS INDEX: 23.92 KG/M2 | TEMPERATURE: 96.2 F | WEIGHT: 162 LBS

## 2021-03-25 DIAGNOSIS — Z48.02 VISIT FOR SUTURE REMOVAL: Primary | ICD-10-CM

## 2021-03-25 PROCEDURE — 4004F PT TOBACCO SCREEN RCVD TLK: CPT | Performed by: NURSE PRACTITIONER

## 2021-03-25 PROCEDURE — 99212 OFFICE O/P EST SF 10 MIN: CPT | Performed by: NURSE PRACTITIONER

## 2021-03-25 PROCEDURE — G8484 FLU IMMUNIZE NO ADMIN: HCPCS | Performed by: NURSE PRACTITIONER

## 2021-03-25 PROCEDURE — G8420 CALC BMI NORM PARAMETERS: HCPCS | Performed by: NURSE PRACTITIONER

## 2021-03-25 PROCEDURE — G8427 DOCREV CUR MEDS BY ELIG CLIN: HCPCS | Performed by: NURSE PRACTITIONER

## 2021-03-25 RX ORDER — SILDENAFIL CITRATE 20 MG/1
TABLET ORAL
COMMUNITY
Start: 2021-02-10 | End: 2021-06-11

## 2021-03-25 NOTE — PROGRESS NOTES
Wt 162 lb (73.5 kg)   BMI 23.92 kg/m²   Weight: 162 lb (73.5 kg)     BP Readings from Last 3 Encounters:   03/25/21 120/76   03/18/21 110/70   02/02/21 (!) 162/100     Wt Readings from Last 3 Encounters:   03/25/21 162 lb (73.5 kg)   03/18/21 166 lb (75.3 kg)   02/12/21 164 lb (74.4 kg)     BMI Readings from Last 3 Encounters:   03/25/21 23.92 kg/m²   03/18/21 24.51 kg/m²   02/12/21 24.22 kg/m²       Physical Exam  Vitals signs reviewed. Constitutional:       Appearance: Normal appearance. He is well-developed. HENT:      Head:        Comments: Sutures in place, no signs infection, edges adhered, minimal scabbing  Cardiovascular:      Rate and Rhythm: Normal rate and regular rhythm. Heart sounds: Normal heart sounds. No murmur. Pulmonary:      Effort: Pulmonary effort is normal.      Breath sounds: Normal breath sounds. Skin:     General: Skin is warm and dry. Findings: Lesion present. Neurological:      Mental Status: He is alert and oriented to person, place, and time. Assessment/Plan    1. Visit for suture removal  Patient presents for suture removal. The wound is well healed without signs of infection. The sutures are removed. Wound care and activity instructions given. Return prn. Return if symptoms worsen or fail to improve, for unresolved symptoms. This dictation was generated by voice recognition computer software. Although all attempts are made to edit the dictation for accuracy, there may be errors in the transcription that are not intended.

## 2021-03-28 DIAGNOSIS — I10 ESSENTIAL HYPERTENSION: ICD-10-CM

## 2021-03-29 RX ORDER — LISINOPRIL 10 MG/1
TABLET ORAL
Qty: 30 TABLET | Refills: 12 | Status: SHIPPED | OUTPATIENT
Start: 2021-03-29 | End: 2022-01-01 | Stop reason: SDUPTHER

## 2021-05-07 DIAGNOSIS — G40.909 SEIZURE DISORDER (HCC): ICD-10-CM

## 2021-05-07 NOTE — TELEPHONE ENCOUNTER
Medication:   Requested Prescriptions     Pending Prescriptions Disp Refills    levETIRAcetam (KEPPRA) 500 MG tablet [Pharmacy Med Name: LEVETIRACETAM 500 MG TABLET] 60 tablet 2     Sig: TAKE 1 TABLET BY MOUTH TWICE A DAY      Last Filled:     Patient Phone Number: [phone] (home)     Last appt: 3/25/2021   Next appt: Visit date not found    Last OARRS: No flowsheet data found.   PDMP Monitoring:    Last PDMP Renee Henry as Reviewed [facility]):  Review User Review Instant Review Result          Preferred Pharmacy:   [facility] [address] [place] [address]. - P [phone] - F [phone]  [address] [place] [address]  Phone: [phone] Fax: [phone]    [address] [place] [address] [address] [phone]  F [phone]  [address] [address]  Phone: [phone] Fax: [phone]    Madelaine Balint [address], [address] [facility]3] [phone] Polly Rosales [phone]  [facility] [address]  Phone: [phone] Fax: [phone]

## 2021-05-09 RX ORDER — LEVETIRACETAM 500 MG/1
TABLET ORAL
Qty: 60 TABLET | Refills: 2 | Status: SHIPPED | OUTPATIENT
Start: 2021-05-09 | End: 2022-01-01 | Stop reason: SDUPTHER

## 2021-06-11 ENCOUNTER — OFFICE VISIT (OUTPATIENT)
Dept: ENT CLINIC | Age: 46
End: 2021-06-11
Payer: MEDICARE

## 2021-06-11 ENCOUNTER — TELEPHONE (OUTPATIENT)
Dept: FAMILY MEDICINE CLINIC | Age: 46
End: 2021-06-11

## 2021-06-11 VITALS
HEIGHT: 68 IN | BODY MASS INDEX: 23.49 KG/M2 | WEIGHT: 155 LBS | HEART RATE: 114 BPM | TEMPERATURE: 96.8 F | SYSTOLIC BLOOD PRESSURE: 131 MMHG | DIASTOLIC BLOOD PRESSURE: 80 MMHG

## 2021-06-11 DIAGNOSIS — R04.0 EPISTAXIS: Primary | ICD-10-CM

## 2021-06-11 DIAGNOSIS — R04.0 EPISTAXIS, RECURRENT: Primary | ICD-10-CM

## 2021-06-11 DIAGNOSIS — J34.0 NOSE ULCERATION: ICD-10-CM

## 2021-06-11 PROCEDURE — G8427 DOCREV CUR MEDS BY ELIG CLIN: HCPCS | Performed by: OTOLARYNGOLOGY

## 2021-06-11 PROCEDURE — 30901 CONTROL OF NOSEBLEED: CPT | Performed by: OTOLARYNGOLOGY

## 2021-06-11 PROCEDURE — 4004F PT TOBACCO SCREEN RCVD TLK: CPT | Performed by: OTOLARYNGOLOGY

## 2021-06-11 PROCEDURE — 99202 OFFICE O/P NEW SF 15 MIN: CPT | Performed by: OTOLARYNGOLOGY

## 2021-06-11 PROCEDURE — G8420 CALC BMI NORM PARAMETERS: HCPCS | Performed by: OTOLARYNGOLOGY

## 2021-06-11 RX ORDER — SODIUM CHLORIDE/ALOE VERA
GEL (GRAM) NASAL
Qty: 1 TUBE | Refills: 3 | Status: SHIPPED | OUTPATIENT
Start: 2021-06-11 | End: 2022-01-01

## 2021-06-11 RX ORDER — SILDENAFIL 50 MG/1
50 TABLET, FILM COATED ORAL PRN
COMMUNITY
End: 2022-01-01

## 2021-06-11 ASSESSMENT — ENCOUNTER SYMPTOMS
TROUBLE SWALLOWING: 0
SORE THROAT: 0
ALLERGIC/IMMUNOLOGIC NEGATIVE: 1
VOICE CHANGE: 0
FACIAL SWELLING: 0
EYES NEGATIVE: 1
SINUS PAIN: 0
RHINORRHEA: 0
SINUS PRESSURE: 0
RESPIRATORY NEGATIVE: 1

## 2021-06-11 NOTE — LETTER
[facility]  [address] [facility] [address]  Phone: [phone]  Fax: [phone]    Georgie Florez MD        June 11, 2021    [address] [place] [address]      To Whom it May Concern:    Tripp Gallardo was seen in my office today and may return to work with no restrictions. If you have any questions or concerns, please don't hesitate to call.     Sincerely,        Georgie Florez MD
Good

## 2021-06-11 NOTE — PROGRESS NOTES
SUBJECTIVE:    Chief Complaint   Patient presents with    Epistaxis     Rt nostril, has been bleeding off and on x 4 days        Alfonzo Guevara is a 51 y.o. male    Over the course the past 4 days, the patient has had recurring daily nosebleeds on the right side usually occurring in the mornings. He controls some with tamponade. He has not been on any medications likely to precipitate bleeding and has had no prior history of similar problems. There is no family history of bleeding disorder. He has had no bleeding elsewhere. He does not smoke except occasionally. No exposure to fumes have been noted and there is been no history of trauma. He is on no medications likely to precipitate any bleeding. Has had no fever. Said no constitutional symptoms. Past Medical History:   Diagnosis Date    Alcohol withdrawal ([facility] 10/12/2017    Seizure ([facility]       Past Surgical History:   Procedure Laterality Date    WRIST SURGERY Right       Family History   Problem Relation Age of Onset    Cancer Mother     Cancer Father       Social History     Tobacco Use    Smoking status: Current Some Day Smoker     Packs/day: 0.50     Years: 31.00     Pack years: 15.50    Smokeless tobacco: Never Used   Substance Use Topics    Alcohol use: Yes     Comment: Drinks 10-15 every day. Review of Systems:  Review of Systems   Constitutional: Negative. Negative for fatigue, fever and unexpected weight change. HENT: Positive for nosebleeds. Negative for congestion, dental problem, drooling, ear discharge, ear pain, facial swelling, hearing loss, mouth sores, postnasal drip, rhinorrhea, sinus pressure, sinus pain, sneezing, sore throat, tinnitus, trouble swallowing and voice change. Eyes: Negative. Respiratory: Negative. Cardiovascular: Negative. Endocrine: Negative. Skin: Negative. Allergic/Immunologic: Negative. Negative for immunocompromised state. Neurological: Negative.     Hematological: Negative for adenopathy. Does not bruise/bleed easily. Psychiatric/Behavioral: Negative. OBJECTIVE:  /80 (Site: Left Upper Arm, Position: Sitting, Cuff Size: Medium Adult)   Pulse 114   Temp 96.8 °F (36 °C) (Temporal)   Ht 5' 8\" (1.727 m)   Wt 155 lb (70.3 kg)   BMI 23.57 kg/m²   Physical Exam  Vitals and nursing note reviewed. Constitutional:       General: He is not in acute distress. Appearance: Normal appearance. He is normal weight. He is not ill-appearing, toxic-appearing or diaphoretic. HENT:      Head: Normocephalic and atraumatic. Right Ear: Tympanic membrane, ear canal and external ear normal. There is no impacted cerumen. Left Ear: Tympanic membrane, ear canal and external ear normal. There is no impacted cerumen. Nose:      Comments: Nasal mucosa treated with cotton pledgets  impregnated with Afrin and lidocaine placed in middle meatuses against turbinates. Pledgets left in place for five minutes and removed enabling enhanced visualization. The exam revealed positive edema of mucosa. it was negative for erythema indicative of infection. There was not evidence of deviation of the septum which was not significant. There were polyps present. .There were not other masses present. The turbinates were not enlarged beyond normal.  A superficial ulcer is noted in the midportion of the septum on the right side. The areas treated with silver nitrate cautery. Mouth/Throat:      Mouth: Mucous membranes are moist.      Pharynx: Oropharynx is clear. No oropharyngeal exudate or posterior oropharyngeal erythema. Eyes:      Extraocular Movements: Extraocular movements intact. Conjunctiva/sclera: Conjunctivae normal.      Pupils: Pupils are equal, round, and reactive to light. Cardiovascular:      Rate and Rhythm: Normal rate. Pulmonary:      Effort: Pulmonary effort is normal.   Musculoskeletal:      Cervical back: Normal range of motion and neck supple.  No rigidity or tenderness. Lymphadenopathy:      Cervical: No cervical adenopathy. Skin:     General: Skin is warm and dry. Neurological:      General: No focal deficit present. Mental Status: He is alert and oriented to person, place, and time. Mental status is at baseline. Psychiatric:         Mood and Affect: Mood normal.         Behavior: Behavior normal.         Thought Content: Thought content normal.         Judgment: Judgment normal.          ASSESSMENT:    Recurrent epistaxis right side with shallow septal ulcer    PLAN:     We will give him a 2-week trial of saline gel to be used 3 times daily and Bactroban to be used twice daily. He will contact me in 2 weeks to determine his response.     Ellen Maddox MD

## 2021-06-14 ENCOUNTER — TELEPHONE (OUTPATIENT)
Dept: ENT CLINIC | Age: 46
End: 2021-06-14

## 2021-06-15 ENCOUNTER — OFFICE VISIT (OUTPATIENT)
Dept: ENT CLINIC | Age: 46
End: 2021-06-15
Payer: MEDICARE

## 2021-06-15 VITALS
HEART RATE: 103 BPM | TEMPERATURE: 97.6 F | OXYGEN SATURATION: 100 % | DIASTOLIC BLOOD PRESSURE: 98 MMHG | SYSTOLIC BLOOD PRESSURE: 149 MMHG

## 2021-06-15 DIAGNOSIS — J34.0 NOSE ULCERATION: Primary | ICD-10-CM

## 2021-06-15 PROCEDURE — G8427 DOCREV CUR MEDS BY ELIG CLIN: HCPCS | Performed by: OTOLARYNGOLOGY

## 2021-06-15 PROCEDURE — 4004F PT TOBACCO SCREEN RCVD TLK: CPT | Performed by: OTOLARYNGOLOGY

## 2021-06-15 PROCEDURE — 99213 OFFICE O/P EST LOW 20 MIN: CPT | Performed by: OTOLARYNGOLOGY

## 2021-06-15 PROCEDURE — G8420 CALC BMI NORM PARAMETERS: HCPCS | Performed by: OTOLARYNGOLOGY

## 2021-06-15 RX ORDER — METHYLPREDNISOLONE 4 MG/1
4 TABLET ORAL SEE ADMIN INSTRUCTIONS
Qty: 1 KIT | Refills: 0 | Status: SHIPPED | OUTPATIENT
Start: 2021-06-15 | End: 2022-01-01

## 2021-06-15 RX ORDER — VALACYCLOVIR HYDROCHLORIDE 1 G/1
1000 TABLET, FILM COATED ORAL 2 TIMES DAILY
Qty: 14 TABLET | Refills: 0 | Status: SHIPPED | OUTPATIENT
Start: 2021-06-15 | End: 2022-01-01

## 2021-06-15 NOTE — PROGRESS NOTES
Patient now is complaining of some numbness in his nose as well as right side of his face associated with an increase in crusting and mild bleeding from the right side. Some congestion is noted on that side as well. There is been no fever. He does relate that this only started very recently in approximately the past week. He had been seen with evidence of an 80 ulcer in the right side of his septum which was cauterized and started on Bactroban along with saline gel therapy. Currently, he appears in no obvious acute distress and there is no active bleeding present. Oral examination remains unremarkable. Ear examination reveals normal-appearing tympanic membranes and ear canals bilaterally. The neck is free of any adenopathy, mass, thyroid enlargement. The external portions of the face and appear entirely normal.Nasal mucosa treated with cotton pledgets  impregnated with Afrin and lidocaine placed in middle meatuses against turbinates. Pledgets left in place for five minutes and removed enabling enhanced visualization. The exam revealed positive edema of mucosa. it was negative for erythema indicative of infection. There was not evidence of deviation of the septum which was not significant. There were not polyps present. .There were other masses present. The turbinates were not enlarged beyond normal.  The ulcer which is seems to be approximately 1-1/2 cm long is present near the anterior portion of the septum and extending to the junction between the cartilaginous and bony portion. No active bleeding is noted. It now appears that this may well be of viral etiology. I have suggested a continuation of his current Bactroban along with saline gel but we will add Medrol Dosepak along with Valtrex. I have asked that he contact me in 1 week to determine his further response. I do not feel that there is a malignancy in this area given the rapidity with which this all began.

## 2021-10-11 NOTE — ED PROVIDER NOTES
I independently performed a history and physical on Fiona Marlow. All diagnostic, treatment, and disposition decisions were made by myself in conjunction with the advanced practice provider. Briefly, this is a 51 y.o. male here for a chief complaint of right-sided rib pain status post mechanical fall few days ago. States he tripped and fell over a glass coffee table and has had pain to his ribs ever since. Has had poor quality sleep and poor appetite since. No other complaints. .    On exam, he is ill-appearing. Diffusely tremulous. He seems to be encephalopathic. Tachycardic. Heart regular lungs clear he is tender over his right ribs. He has injected sclera. Significant icterus as well. EKG        Screenings                   MDM  50-year-old male with what appears to be obvious alcohol withdrawal and finally. He denies any recent alcohol use. I reviewed his prior records and visits he has a history of alcohol withdrawal as recently as July 2020 which is a seizure accompanied by encephalopathy. Obtain a trauma work-up with a claudio given his rib pain and injuries and his encephalopathy and check his levels, give banana bag, given benzodiazepines. Patient eloped from the department prior to his work-up being completed. Patient Referrals:  No follow-up provider specified. Discharge Medications:  Discharge Medication List as of 10/11/2021  1:22 AM          FINAL IMPRESSION  1. Eloped from emergency department        Blood pressure (!) 166/110, pulse 124, temperature 99 °F (37.2 °C), temperature source Oral, resp. rate 16, height 5' 9\" (1.753 m), weight 147 lb (66.7 kg), SpO2 98 %. For further details of Rachael Kingsley emergency department encounter, please see documentation by advanced practice provider, Tabitha Lawrence.        Ariane Ambriz MD  10/11/21 9007

## 2021-10-11 NOTE — ED PROVIDER NOTES
[facility]        Pt Name: Alison Stanton  MRN: [id number]  [place] 1975  Date of evaluation: 10/10/2021  Provider: Marquis Blue  PCP: MULUGETA Link NP  Note Started: 10:07 PM EDT        I have seen and evaluated this patient with my supervising physician Nelli Gar MD.    [address] [place] [address]       Chief Complaint   Patient presents with    Fall     pt states that thursday he fell into a glass table, pt states that ever since he hasnt been able to eat because he will vomit, hurts when move midsection on whole right side, sharp pains especially with coughing        HISTORY OF PRESENT ILLNESS   (Location, Timing/Onset, Context/Setting, Quality, Duration, Modifying Factors, Severity, Associated Signs and Symptoms)  Note limiting factors. Chief Complaint: right rib and upper abd pain    Alison Stanton is a 51 y.o. male who presents to the emergency department for evaluation of right-sided rib and upper abdomen pain. Patient states he fell into a table on Thursday and states ever since he has had severe pain to his right-sided ribs that is particularly bad when coughing, moving. Patient states he also has some pain into his right upper quadrant abdomen. He states that if he tries to even drink water he will vomit. He has not tried to eat anything since Thursday. Patient is a smoker and states that he does cough frequently due to that. Patient is notably tachycardic, diaphoretic, tremulous. Patient denies that he drinks alcohol on a daily basis. States he has not had anything to drink in the past few days. Denies any drug use. Nursing Notes were all reviewed and agreed with or any disagreements were addressed in the HPI. REVIEW OF SYSTEMS    (2-9 systems for level 4, 10 or more for level 5)     Review of Systems   Constitutional: Negative for fatigue and fever. HENT: Negative.     Eyes: Negative for visual disturbance. Respiratory: Negative for shortness of breath. Cardiovascular: Negative for chest pain. Gastrointestinal: Negative for abdominal pain, nausea and vomiting. Genitourinary: Negative. Musculoskeletal: Negative. Skin: Negative. Neurological: Negative. Positives and Pertinent negatives as per HPI. Except as noted above in the ROS, all other systems were reviewed and negative. PAST MEDICAL HISTORY     Past Medical History:   Diagnosis Date    Alcohol withdrawal ([facility] 10/12/2017    Seizure ([facility]          SURGICAL HISTORY     Past Surgical History:   Procedure Laterality Date    WRIST SURGERY Right          CURRENTMEDICATIONS       Discharge Medication List as of 10/11/2021  1:22 AM      CONTINUE these medications which have NOT CHANGED    Details   levETIRAcetam (KEPPRA) 500 MG tablet TAKE 1 TABLET BY MOUTH TWICE A DAY, Disp-60 tablet, R-2Normal      lisinopril (PRINIVIL;ZESTRIL) 10 MG tablet TAKE 1 TABLET BY MOUTH EVERY DAY, Disp-30 tablet, R-12Normal      valACYclovir (VALTREX) 1 g tablet Take 1 tablet by mouth 2 times daily, Disp-14 tablet, R-0Normal      methylPREDNISolone (MEDROL, HORTENSIA,) 4 MG tablet Take 1 tablet by mouth See Admin Instructions Take by mouth., Disp-1 kit, R-0Normal      sildenafil (VIAGRA) 50 MG tablet Take 50 mg by mouth as needed for Erectile DysfunctionHistorical Med      saline nasal gel (AYR) GEL Apply to right naris at 10 AM, Noon, and 3 PM for 2 weeks, Disp-1 Tube, R-3, Normal      mupirocin (BACTROBAN) 2 % ointment Apply topically 2 times daily. Apply with Q tip in right naris in AM and at bedtime for 2 weeks, Disp-15 g, R-0, Normal      Blood Pressure KIT DAILY Starting Tue 2/2/2021, Disp-1 kit, R-0, Print               ALLERGIES     Seasonal    FAMILYHISTORY       Family History   Problem Relation Age of Onset    Cancer Mother     Cancer Father           SOCIAL HISTORY       Social History     Tobacco Use    Smoking status: Current Some Day Smoker     Packs/day: 0.50     Years: 31.00     Pack years: 15.50    Smokeless tobacco: Never Used   Vaping Use    Vaping Use: Never used   Substance Use Topics    Alcohol use: Yes     Comment: 2 every other day     Drug use: Not Currently     Types: Marijuana     Comment: Occ       SCREENINGS             PHYSICAL EXAM    (up to 7 for level 4, 8 or more for level 5)     ED Triage Vitals [10/10/21 2139]   BP Temp Temp Source Pulse Resp SpO2 Height Weight   (!) 166/110 99 °F (37.2 °C) Oral 124 16 98 % 5' 9\" (1.753 m) 147 lb (66.7 kg)       Physical Exam  Vitals and nursing note reviewed. Constitutional:       General: He is not in acute distress. Appearance: He is well-developed. He is ill-appearing. He is not toxic-appearing or diaphoretic. HENT:      Head: Normocephalic and atraumatic. Nose: Nose normal.      Mouth/Throat:      Pharynx: Oropharynx is clear. Eyes:      General:         Right eye: No discharge. Left eye: No discharge. Conjunctiva/sclera: Conjunctivae normal.      Pupils: Pupils are equal, round, and reactive to light. Cardiovascular:      Rate and Rhythm: Normal rate and regular rhythm. Heart sounds: Normal heart sounds. No murmur heard. No gallop. Pulmonary:      Effort: Pulmonary effort is normal. No respiratory distress. Breath sounds: Normal breath sounds. No wheezing, rhonchi or rales. Chest:       Abdominal:      General: Abdomen is flat. Bowel sounds are normal.      Palpations: Abdomen is soft. Tenderness: There is abdominal tenderness in the right upper quadrant. There is no guarding or rebound. Musculoskeletal:         General: Normal range of motion. Cervical back: Normal range of motion and neck supple. Skin:     General: Skin is warm and moist.      Coloration: Skin is pale. Comments: Significant diaphoresis to face.    Neurological:      Mental Status: He is alert and oriented to person, place, and time.      GCS: GCS eye subscore is 4. GCS verbal subscore is 5. GCS motor subscore is 6. Cranial Nerves: Cranial nerves are intact. Sensory: Sensation is intact. Motor: Tremor present. Comments: Hand tremor bilaterally. Psychiatric:         Behavior: Behavior normal.         DIAGNOSTIC RESULTS   LABS:    Labs Reviewed   CBC WITH AUTO DIFFERENTIAL - Abnormal; Notable for the following components:       Result Value    RBC 3.70 (*)     Hemoglobin 13.0 (*)     Hematocrit 37.5 (*)     .5 (*)     MCH 35.1 (*)     All other components within normal limits    Narrative:     Performed at:  [facility] [address] [facility], [address]   Phone [phone]   LACTATE, SEPSIS - Abnormal; Notable for the following components:    Lactic Acid, Sepsis 2.3 (*)     All other components within normal limits    Narrative:     Performed at:  [facility] [address] [facility],  [place], [address]   Phone [phone]    Narrative:     Savanna Brnuo. [phone],  Rejected Test Name cmpx lipas etoh/Called to: Vivienne Garg, 10/10/2021 23:17, by  [facility]  Performed at:  [facility] [address] [facility]  [place], [address]   Phone [phone]   LACTATE, SEPSIS   URINE RT REFLEX TO CULTURE   VITAMIN B12 & FOLATE   VITAMIN B1   COMPREHENSIVE METABOLIC PANEL W/ REFLEX TO MG FOR LOW K   LIPASE   ETHANOL       When ordered only abnormal lab results are displayed. All other labs were within normal range or not returned as of this dictation. EKG: When ordered, EKG's are interpreted by the Emergency Department Physician in the absence of a cardiologist.  Please see their note for interpretation of EKG.     RADIOLOGY:   Non-plain film images such as CT, Ultrasound and MRI are read by the radiologist. Plain radiographic images are visualized and preliminarily interpreted by the ED Provider with the below findings:        Interpretation per the Radiologist below, if available at the time of this note:    No orders to display     No results found. PROCEDURES   Unless otherwise noted below, none     Procedures    CRITICAL CARE TIME   N/A    CONSULTS:  None      EMERGENCY DEPARTMENT COURSE and DIFFERENTIAL DIAGNOSIS/MDM:   Vitals:    Vitals:    10/10/21 2139 10/10/21 2211   BP: (!) 166/110 (!) 166/110   Pulse: 124 124   Resp: 16    Temp: 99 °F (37.2 °C)    TempSrc: Oral    SpO2: 98%    Weight: 147 lb (66.7 kg)    Height: 5' 9\" (1.753 m)        Patient was given the following medications:  Medications   0.9 % sodium chloride bolus (has no administration in time range)   ondansetron (ZOFRAN) injection 4 mg (has no administration in time range)   sodium chloride 0.9 % 7,333 mL with folic acid 1 mg, adult multi-vitamin with vitamin k 10 mL, thiamine 300 mg (has no administration in time range)   LORazepam (ATIVAN) injection 2 mg (has no administration in time range)         Patient presents emergency department for evaluation of right-sided pain after a fall on Thursday. Patient has some older looking bruises to right-sided chest wall. Point tenderness to this area. No obvious deformity or step-off. No flail chest noted. No abnormality to auscultation to lung fields bilaterally. Patient has right upper quadrant abdominal pain without rebound or guarding. No bruising or distention noted. Patient has profuse diaphoresis with his face and scalp and he appears pale with red rimmed eyes. Patient has tremor to bilateral hands. When asked if he drank alcohol patient states he did not drink any alcohol today or in the past few days. When asked if he drinks alcohol daily he says no. I have very high suspicion of alcohol withdrawal in this patient and patient has a previous diagnosis of alcohol dependence. Patient's current CIWA score is 14.   Patient is started on fluids, banana bag, Zofran, Ativan and trauma protocol imaging is ordered. Patient eloped from the emergency department without receiving any of the medications or imaging. No formal discharge was performed. FINAL IMPRESSION      1. Eloped from emergency department          DISPOSITION/PLAN   DISPOSITION Eloped - Left Before Treatment Complete 10/11/2021 01:18:53 AM      PATIENT REFERRED TO:  No follow-up provider specified.     DISCHARGE MEDICATIONS:  Discharge Medication List as of 10/11/2021  1:22 AM          DISCONTINUED MEDICATIONS:  Discharge Medication List as of 10/11/2021  1:22 AM                 (Please note that portions of this note were completed with a voice recognition program.  Efforts were made to edit the dictations but occasionally words are mis-transcribed.)    Kris Downing PA-C (electronically signed)            Kris Downing PA-C  10/11/21 0303

## 2022-01-01 ENCOUNTER — APPOINTMENT (OUTPATIENT)
Dept: CT IMAGING | Age: 47
DRG: 045 | End: 2022-01-01
Payer: MEDICARE

## 2022-01-01 ENCOUNTER — OFFICE VISIT (OUTPATIENT)
Dept: SURGERY | Age: 47
End: 2022-01-01
Payer: MEDICARE

## 2022-01-01 ENCOUNTER — APPOINTMENT (OUTPATIENT)
Dept: GENERAL RADIOLOGY | Age: 47
DRG: 045 | End: 2022-01-01
Payer: MEDICARE

## 2022-01-01 ENCOUNTER — TELEPHONE (OUTPATIENT)
Dept: FAMILY MEDICINE CLINIC | Age: 47
End: 2022-01-01

## 2022-01-01 ENCOUNTER — APPOINTMENT (OUTPATIENT)
Dept: MRI IMAGING | Age: 47
DRG: 045 | End: 2022-01-01
Payer: MEDICARE

## 2022-01-01 ENCOUNTER — OFFICE VISIT (OUTPATIENT)
Dept: FAMILY MEDICINE CLINIC | Age: 47
End: 2022-01-01
Payer: MEDICARE

## 2022-01-01 ENCOUNTER — HOSPITAL ENCOUNTER (INPATIENT)
Age: 47
LOS: 1 days | Discharge: HOME | DRG: 045 | End: 2022-08-19
Attending: EMERGENCY MEDICINE | Admitting: INTERNAL MEDICINE
Payer: MEDICARE

## 2022-01-01 VITALS
SYSTOLIC BLOOD PRESSURE: 130 MMHG | DIASTOLIC BLOOD PRESSURE: 86 MMHG | BODY MASS INDEX: 21.56 KG/M2 | WEIGHT: 146 LBS | OXYGEN SATURATION: 99 % | HEART RATE: 72 BPM | TEMPERATURE: 97.2 F

## 2022-01-01 VITALS
BODY MASS INDEX: 23.05 KG/M2 | WEIGHT: 155.6 LBS | SYSTOLIC BLOOD PRESSURE: 157 MMHG | HEIGHT: 69 IN | DIASTOLIC BLOOD PRESSURE: 99 MMHG | TEMPERATURE: 97.7 F | RESPIRATION RATE: 18 BRPM | OXYGEN SATURATION: 98 % | HEART RATE: 82 BPM

## 2022-01-01 VITALS
SYSTOLIC BLOOD PRESSURE: 148 MMHG | BODY MASS INDEX: 23.18 KG/M2 | HEART RATE: 88 BPM | OXYGEN SATURATION: 98 % | TEMPERATURE: 97.1 F | WEIGHT: 157 LBS | DIASTOLIC BLOOD PRESSURE: 98 MMHG

## 2022-01-01 VITALS — WEIGHT: 155 LBS | BODY MASS INDEX: 22.89 KG/M2 | DIASTOLIC BLOOD PRESSURE: 80 MMHG | SYSTOLIC BLOOD PRESSURE: 124 MMHG

## 2022-01-01 DIAGNOSIS — G40.909 SEIZURE DISORDER (HCC): ICD-10-CM

## 2022-01-01 DIAGNOSIS — G40.909 SEIZURE DISORDER (HCC): Primary | ICD-10-CM

## 2022-01-01 DIAGNOSIS — I10 ESSENTIAL HYPERTENSION: ICD-10-CM

## 2022-01-01 DIAGNOSIS — R29.810 FACIAL DROOP: Primary | ICD-10-CM

## 2022-01-01 DIAGNOSIS — R47.1 DYSARTHRIA: ICD-10-CM

## 2022-01-01 DIAGNOSIS — H10.9 CONJUNCTIVITIS OF BOTH EYES, UNSPECIFIED CONJUNCTIVITIS TYPE: Primary | ICD-10-CM

## 2022-01-01 DIAGNOSIS — E61.2 MAGNESIUM DEFICIENCY: Primary | ICD-10-CM

## 2022-01-01 DIAGNOSIS — Z00.00 PREVENTATIVE HEALTH CARE: Primary | ICD-10-CM

## 2022-01-01 DIAGNOSIS — F10.10 ETOH ABUSE: ICD-10-CM

## 2022-01-01 DIAGNOSIS — G25.0 ESSENTIAL TREMOR: ICD-10-CM

## 2022-01-01 DIAGNOSIS — L72.3 SEBACEOUS CYST: Primary | ICD-10-CM

## 2022-01-01 LAB
A/G RATIO: 1.5 (ref 1.1–2.2)
ALBUMIN SERPL-MCNC: 4.2 G/DL (ref 3.4–5)
ALBUMIN SERPL-MCNC: 4.5 G/DL (ref 3.4–5)
ALP BLD-CCNC: 52 U/L (ref 40–129)
ALP BLD-CCNC: 53 U/L (ref 40–129)
ALT SERPL-CCNC: 102 U/L (ref 10–40)
ALT SERPL-CCNC: 90 U/L (ref 10–40)
ANION GAP SERPL CALCULATED.3IONS-SCNC: 15 MMOL/L (ref 3–16)
ANION GAP SERPL CALCULATED.3IONS-SCNC: 20 MMOL/L (ref 3–16)
AST SERPL-CCNC: 117 U/L (ref 15–37)
AST SERPL-CCNC: 145 U/L (ref 15–37)
BASOPHILS ABSOLUTE: 0.1 K/UL (ref 0–0.2)
BASOPHILS RELATIVE PERCENT: 0.9 %
BILIRUB SERPL-MCNC: 0.9 MG/DL (ref 0–1)
BILIRUB SERPL-MCNC: 1.1 MG/DL (ref 0–1)
BILIRUBIN DIRECT: 0.3 MG/DL (ref 0–0.3)
BILIRUBIN, INDIRECT: 0.8 MG/DL (ref 0–1)
BUN BLDV-MCNC: 12 MG/DL (ref 7–20)
BUN BLDV-MCNC: 9 MG/DL (ref 7–20)
CALCIUM SERPL-MCNC: 8.4 MG/DL (ref 8.3–10.6)
CALCIUM SERPL-MCNC: 8.6 MG/DL (ref 8.3–10.6)
CHLORIDE BLD-SCNC: 91 MMOL/L (ref 99–110)
CHLORIDE BLD-SCNC: 98 MMOL/L (ref 99–110)
CHOLESTEROL, TOTAL: 225 MG/DL (ref 0–199)
CO2: 23 MMOL/L (ref 21–32)
CO2: 24 MMOL/L (ref 21–32)
CREAT SERPL-MCNC: 0.7 MG/DL (ref 0.9–1.3)
CREAT SERPL-MCNC: 0.9 MG/DL (ref 0.9–1.3)
EKG ATRIAL RATE: 98 BPM
EKG DIAGNOSIS: NORMAL
EKG P AXIS: 43 DEGREES
EKG P-R INTERVAL: 168 MS
EKG Q-T INTERVAL: 394 MS
EKG QRS DURATION: 88 MS
EKG QTC CALCULATION (BAZETT): 503 MS
EKG R AXIS: 69 DEGREES
EKG T AXIS: 44 DEGREES
EKG VENTRICULAR RATE: 98 BPM
EOSINOPHILS ABSOLUTE: 0 K/UL (ref 0–0.6)
EOSINOPHILS RELATIVE PERCENT: 0.7 %
ESTIMATED AVERAGE GLUCOSE: 108.3 MG/DL
ETHANOL: 255 MG/DL (ref 0–0.08)
FOLATE: 5.15 NG/ML (ref 4.78–24.2)
GFR AFRICAN AMERICAN: >60
GFR AFRICAN AMERICAN: >60
GFR NON-AFRICAN AMERICAN: >60
GFR NON-AFRICAN AMERICAN: >60
GLUCOSE BLD-MCNC: 83 MG/DL (ref 70–99)
GLUCOSE BLD-MCNC: 86 MG/DL (ref 70–99)
HBA1C MFR BLD: 5.4 %
HCT VFR BLD CALC: 34.2 % (ref 40.5–52.5)
HCT VFR BLD CALC: 36.2 % (ref 40.5–52.5)
HDLC SERPL-MCNC: 91 MG/DL (ref 40–60)
HEMOGLOBIN: 11.4 G/DL (ref 13.5–17.5)
HEMOGLOBIN: 11.8 G/DL (ref 13.5–17.5)
HEPATITIS C ANTIBODY INTERPRETATION: NORMAL
INR BLD: 0.92 (ref 0.87–1.14)
KEPPRA DOSE AMT: ABNORMAL
KEPPRA: 4.2 UG/ML (ref 6–46)
LACTIC ACID: 2.7 MMOL/L (ref 0.4–2)
LDL CHOLESTEROL CALCULATED: 124 MG/DL
LV EF: 65 %
LVEF MODALITY: NORMAL
LYMPHOCYTES ABSOLUTE: 2 K/UL (ref 1–5.1)
LYMPHOCYTES RELATIVE PERCENT: 33.8 %
MAGNESIUM: 1 MG/DL (ref 1.8–2.4)
MAGNESIUM: 1.1 MG/DL (ref 1.8–2.4)
MAGNESIUM: 2 MG/DL (ref 1.8–2.4)
MCH RBC QN AUTO: 34.2 PG (ref 26–34)
MCH RBC QN AUTO: 35.2 PG (ref 26–34)
MCHC RBC AUTO-ENTMCNC: 32.5 G/DL (ref 31–36)
MCHC RBC AUTO-ENTMCNC: 33.3 G/DL (ref 31–36)
MCV RBC AUTO: 102.5 FL (ref 80–100)
MCV RBC AUTO: 108.3 FL (ref 80–100)
MONOCYTES ABSOLUTE: 0.8 K/UL (ref 0–1.3)
MONOCYTES RELATIVE PERCENT: 14.1 %
NEUTROPHILS ABSOLUTE: 2.9 K/UL (ref 1.7–7.7)
NEUTROPHILS RELATIVE PERCENT: 50.5 %
PDW BLD-RTO: 13.9 % (ref 12.4–15.4)
PDW BLD-RTO: 14.6 % (ref 12.4–15.4)
PLATELET # BLD: 137 K/UL (ref 135–450)
PLATELET # BLD: 174 K/UL (ref 135–450)
PMV BLD AUTO: 7.6 FL (ref 5–10.5)
PMV BLD AUTO: 8.1 FL (ref 5–10.5)
POTASSIUM REFLEX MAGNESIUM: 3.3 MMOL/L (ref 3.5–5.1)
POTASSIUM SERPL-SCNC: 3.7 MMOL/L (ref 3.5–5.1)
PROTHROMBIN TIME: 12.3 SEC (ref 11.7–14.5)
RBC # BLD: 3.34 M/UL (ref 4.2–5.9)
RBC # BLD: 3.34 M/UL (ref 4.2–5.9)
REASON FOR REJECTION: NORMAL
REJECTED TEST: NORMAL
SODIUM BLD-SCNC: 134 MMOL/L (ref 136–145)
SODIUM BLD-SCNC: 137 MMOL/L (ref 136–145)
TOTAL CK: 320 U/L (ref 39–308)
TOTAL CK: 383 U/L (ref 39–308)
TOTAL PROTEIN: 7.6 G/DL (ref 6.4–8.2)
TOTAL PROTEIN: 7.7 G/DL (ref 6.4–8.2)
TRIGL SERPL-MCNC: 48 MG/DL (ref 0–150)
TROPONIN: <0.01 NG/ML
VITAMIN B-12: 739 PG/ML (ref 211–911)
VITAMIN D 25-HYDROXY: 40.3 NG/ML
VLDLC SERPL CALC-MCNC: 10 MG/DL
WBC # BLD: 4.5 K/UL (ref 4–11)
WBC # BLD: 5.8 K/UL (ref 4–11)

## 2022-01-01 PROCEDURE — 71045 X-RAY EXAM CHEST 1 VIEW: CPT

## 2022-01-01 PROCEDURE — 6360000004 HC RX CONTRAST MEDICATION: Performed by: EMERGENCY MEDICINE

## 2022-01-01 PROCEDURE — 99213 OFFICE O/P EST LOW 20 MIN: CPT | Performed by: SURGERY

## 2022-01-01 PROCEDURE — 85025 COMPLETE CBC W/AUTO DIFF WBC: CPT

## 2022-01-01 PROCEDURE — 36415 COLL VENOUS BLD VENIPUNCTURE: CPT

## 2022-01-01 PROCEDURE — 97116 GAIT TRAINING THERAPY: CPT

## 2022-01-01 PROCEDURE — 2060000000 HC ICU INTERMEDIATE R&B

## 2022-01-01 PROCEDURE — 99285 EMERGENCY DEPT VISIT HI MDM: CPT

## 2022-01-01 PROCEDURE — 6360000002 HC RX W HCPCS: Performed by: INTERNAL MEDICINE

## 2022-01-01 PROCEDURE — 6370000000 HC RX 637 (ALT 250 FOR IP): Performed by: INTERNAL MEDICINE

## 2022-01-01 PROCEDURE — 85027 COMPLETE CBC AUTOMATED: CPT

## 2022-01-01 PROCEDURE — 83036 HEMOGLOBIN GLYCOSYLATED A1C: CPT

## 2022-01-01 PROCEDURE — 84484 ASSAY OF TROPONIN QUANT: CPT

## 2022-01-01 PROCEDURE — 97161 PT EVAL LOW COMPLEX 20 MIN: CPT

## 2022-01-01 PROCEDURE — 80076 HEPATIC FUNCTION PANEL: CPT

## 2022-01-01 PROCEDURE — 80053 COMPREHEN METABOLIC PANEL: CPT

## 2022-01-01 PROCEDURE — 70496 CT ANGIOGRAPHY HEAD: CPT

## 2022-01-01 PROCEDURE — 80048 BASIC METABOLIC PNL TOTAL CA: CPT

## 2022-01-01 PROCEDURE — 83735 ASSAY OF MAGNESIUM: CPT

## 2022-01-01 PROCEDURE — 93010 ELECTROCARDIOGRAM REPORT: CPT | Performed by: INTERNAL MEDICINE

## 2022-01-01 PROCEDURE — 2580000003 HC RX 258: Performed by: NURSE PRACTITIONER

## 2022-01-01 PROCEDURE — 70551 MRI BRAIN STEM W/O DYE: CPT

## 2022-01-01 PROCEDURE — 93306 TTE W/DOPPLER COMPLETE: CPT

## 2022-01-01 PROCEDURE — 99214 OFFICE O/P EST MOD 30 MIN: CPT | Performed by: NURSE PRACTITIONER

## 2022-01-01 PROCEDURE — 80061 LIPID PANEL: CPT

## 2022-01-01 PROCEDURE — G8427 DOCREV CUR MEDS BY ELIG CLIN: HCPCS | Performed by: NURSE PRACTITIONER

## 2022-01-01 PROCEDURE — 1200000000 HC SEMI PRIVATE

## 2022-01-01 PROCEDURE — 82550 ASSAY OF CK (CPK): CPT

## 2022-01-01 PROCEDURE — 83605 ASSAY OF LACTIC ACID: CPT

## 2022-01-01 PROCEDURE — 85610 PROTHROMBIN TIME: CPT

## 2022-01-01 PROCEDURE — 97165 OT EVAL LOW COMPLEX 30 MIN: CPT

## 2022-01-01 PROCEDURE — 92526 ORAL FUNCTION THERAPY: CPT

## 2022-01-01 PROCEDURE — 97530 THERAPEUTIC ACTIVITIES: CPT

## 2022-01-01 PROCEDURE — 93005 ELECTROCARDIOGRAM TRACING: CPT | Performed by: EMERGENCY MEDICINE

## 2022-01-01 PROCEDURE — 4004F PT TOBACCO SCREEN RCVD TLK: CPT | Performed by: NURSE PRACTITIONER

## 2022-01-01 PROCEDURE — 70450 CT HEAD/BRAIN W/O DYE: CPT

## 2022-01-01 PROCEDURE — 99396 PREV VISIT EST AGE 40-64: CPT | Performed by: NURSE PRACTITIONER

## 2022-01-01 PROCEDURE — 82077 ASSAY SPEC XCP UR&BREATH IA: CPT

## 2022-01-01 PROCEDURE — G8420 CALC BMI NORM PARAMETERS: HCPCS | Performed by: NURSE PRACTITIONER

## 2022-01-01 PROCEDURE — 6360000002 HC RX W HCPCS: Performed by: NURSE PRACTITIONER

## 2022-01-01 PROCEDURE — 92610 EVALUATE SWALLOWING FUNCTION: CPT

## 2022-01-01 RX ORDER — LISINOPRIL 10 MG/1
TABLET ORAL
Qty: 30 TABLET | Refills: 0 | Status: SHIPPED | OUTPATIENT
Start: 2022-01-01 | End: 2022-01-01 | Stop reason: ALTCHOICE

## 2022-01-01 RX ORDER — LISINOPRIL 20 MG/1
20 TABLET ORAL DAILY
Status: DISCONTINUED | OUTPATIENT
Start: 2022-01-01 | End: 2022-01-01 | Stop reason: HOSPADM

## 2022-01-01 RX ORDER — POLYMYXIN B SULFATE AND TRIMETHOPRIM 1; 10000 MG/ML; [USP'U]/ML
1 SOLUTION OPHTHALMIC EVERY 6 HOURS
Qty: 1 EACH | Refills: 0 | Status: SHIPPED | OUTPATIENT
Start: 2022-01-01 | End: 2022-01-01

## 2022-01-01 RX ORDER — LORAZEPAM 2 MG/ML
3 INJECTION INTRAMUSCULAR
Status: DISCONTINUED | OUTPATIENT
Start: 2022-01-01 | End: 2022-01-01 | Stop reason: HOSPADM

## 2022-01-01 RX ORDER — LORAZEPAM 1 MG/1
1 TABLET ORAL
Status: DISCONTINUED | OUTPATIENT
Start: 2022-01-01 | End: 2022-01-01 | Stop reason: HOSPADM

## 2022-01-01 RX ORDER — M-VIT,TX,IRON,MINS/CALC/FOLIC 27MG-0.4MG
1 TABLET ORAL DAILY
Status: DISCONTINUED | OUTPATIENT
Start: 2022-01-01 | End: 2022-01-01 | Stop reason: HOSPADM

## 2022-01-01 RX ORDER — ASPIRIN 81 MG/1
81 TABLET ORAL DAILY
Status: DISCONTINUED | OUTPATIENT
Start: 2022-01-01 | End: 2022-01-01 | Stop reason: HOSPADM

## 2022-01-01 RX ORDER — SODIUM CHLORIDE 9 MG/ML
INJECTION, SOLUTION INTRAVENOUS PRN
Status: DISCONTINUED | OUTPATIENT
Start: 2022-01-01 | End: 2022-01-01 | Stop reason: HOSPADM

## 2022-01-01 RX ORDER — ENOXAPARIN SODIUM 100 MG/ML
40 INJECTION SUBCUTANEOUS DAILY
Status: DISCONTINUED | OUTPATIENT
Start: 2022-01-01 | End: 2022-01-01 | Stop reason: HOSPADM

## 2022-01-01 RX ORDER — LORAZEPAM 2 MG/ML
2 INJECTION INTRAMUSCULAR
Status: DISCONTINUED | OUTPATIENT
Start: 2022-01-01 | End: 2022-01-01 | Stop reason: HOSPADM

## 2022-01-01 RX ORDER — MAGNESIUM CHLORIDE 71.5 G/G
2 TABLET ORAL DAILY
Qty: 60 TABLET | Refills: 2 | Status: SHIPPED | OUTPATIENT
Start: 2022-01-01 | End: 2022-10-27

## 2022-01-01 RX ORDER — ASPIRIN 300 MG/1
300 SUPPOSITORY RECTAL DAILY
Status: DISCONTINUED | OUTPATIENT
Start: 2022-01-01 | End: 2022-01-01 | Stop reason: HOSPADM

## 2022-01-01 RX ORDER — LORAZEPAM 1 MG/1
4 TABLET ORAL
Status: DISCONTINUED | OUTPATIENT
Start: 2022-01-01 | End: 2022-01-01 | Stop reason: HOSPADM

## 2022-01-01 RX ORDER — AMLODIPINE BESYLATE 5 MG/1
5 TABLET ORAL DAILY
Qty: 30 TABLET | Refills: 3 | Status: SHIPPED | OUTPATIENT
Start: 2022-01-01 | End: 2022-01-01 | Stop reason: ALTCHOICE

## 2022-01-01 RX ORDER — LORAZEPAM 1 MG/1
2 TABLET ORAL
Status: DISCONTINUED | OUTPATIENT
Start: 2022-01-01 | End: 2022-01-01 | Stop reason: HOSPADM

## 2022-01-01 RX ORDER — LABETALOL HYDROCHLORIDE 5 MG/ML
10 INJECTION, SOLUTION INTRAVENOUS EVERY 10 MIN PRN
Status: DISCONTINUED | OUTPATIENT
Start: 2022-01-01 | End: 2022-01-01 | Stop reason: HOSPADM

## 2022-01-01 RX ORDER — ASPIRIN 81 MG/1
324 TABLET, CHEWABLE ORAL ONCE
Status: DISCONTINUED | OUTPATIENT
Start: 2022-01-01 | End: 2022-01-01 | Stop reason: ALTCHOICE

## 2022-01-01 RX ORDER — LORAZEPAM 1 MG/1
3 TABLET ORAL
Status: DISCONTINUED | OUTPATIENT
Start: 2022-01-01 | End: 2022-01-01 | Stop reason: HOSPADM

## 2022-01-01 RX ORDER — NICOTINE 21 MG/24HR
1 PATCH, TRANSDERMAL 24 HOURS TRANSDERMAL DAILY
Status: DISCONTINUED | OUTPATIENT
Start: 2022-01-01 | End: 2022-01-01 | Stop reason: HOSPADM

## 2022-01-01 RX ORDER — ATORVASTATIN CALCIUM 80 MG/1
80 TABLET, FILM COATED ORAL NIGHTLY
Status: DISCONTINUED | OUTPATIENT
Start: 2022-01-01 | End: 2022-01-01 | Stop reason: HOSPADM

## 2022-01-01 RX ORDER — LORAZEPAM 2 MG/ML
4 INJECTION INTRAMUSCULAR
Status: DISCONTINUED | OUTPATIENT
Start: 2022-01-01 | End: 2022-01-01 | Stop reason: HOSPADM

## 2022-01-01 RX ORDER — ONDANSETRON 4 MG/1
4 TABLET, ORALLY DISINTEGRATING ORAL EVERY 8 HOURS PRN
Status: DISCONTINUED | OUTPATIENT
Start: 2022-01-01 | End: 2022-01-01 | Stop reason: HOSPADM

## 2022-01-01 RX ORDER — MAGNESIUM SULFATE 1 G/100ML
1000 INJECTION INTRAVENOUS ONCE
Status: DISCONTINUED | OUTPATIENT
Start: 2022-01-01 | End: 2022-01-01

## 2022-01-01 RX ORDER — PROPRANOLOL HCL 60 MG
60 CAPSULE, EXTENDED RELEASE 24HR ORAL DAILY
Status: DISCONTINUED | OUTPATIENT
Start: 2022-01-01 | End: 2022-01-01 | Stop reason: HOSPADM

## 2022-01-01 RX ORDER — SODIUM CHLORIDE 0.9 % (FLUSH) 0.9 %
5-40 SYRINGE (ML) INJECTION PRN
Status: DISCONTINUED | OUTPATIENT
Start: 2022-01-01 | End: 2022-01-01 | Stop reason: HOSPADM

## 2022-01-01 RX ORDER — ATORVASTATIN CALCIUM 80 MG/1
40 TABLET, FILM COATED ORAL NIGHTLY
Qty: 30 TABLET | Refills: 3 | Status: SHIPPED | OUTPATIENT
Start: 2022-01-01

## 2022-01-01 RX ORDER — GAUZE BANDAGE 2" X 2"
100 BANDAGE TOPICAL DAILY
Status: DISCONTINUED | OUTPATIENT
Start: 2022-01-01 | End: 2022-01-01 | Stop reason: HOSPADM

## 2022-01-01 RX ORDER — LORAZEPAM 2 MG/ML
1 INJECTION INTRAMUSCULAR
Status: DISCONTINUED | OUTPATIENT
Start: 2022-01-01 | End: 2022-01-01 | Stop reason: HOSPADM

## 2022-01-01 RX ORDER — LISINOPRIL 20 MG/1
20 TABLET ORAL DAILY
Qty: 30 TABLET | Refills: 3 | Status: SHIPPED | OUTPATIENT
Start: 2022-01-01

## 2022-01-01 RX ORDER — LEVETIRACETAM 500 MG/1
TABLET ORAL
Qty: 60 TABLET | Refills: 2 | Status: SHIPPED | OUTPATIENT
Start: 2022-01-01

## 2022-01-01 RX ORDER — SODIUM CHLORIDE 0.9 % (FLUSH) 0.9 %
5-40 SYRINGE (ML) INJECTION EVERY 12 HOURS SCHEDULED
Status: DISCONTINUED | OUTPATIENT
Start: 2022-01-01 | End: 2022-01-01 | Stop reason: HOSPADM

## 2022-01-01 RX ORDER — LISINOPRIL 10 MG/1
TABLET ORAL
Qty: 30 TABLET | Refills: 0 | OUTPATIENT
Start: 2022-01-01

## 2022-01-01 RX ORDER — ONDANSETRON 2 MG/ML
4 INJECTION INTRAMUSCULAR; INTRAVENOUS EVERY 6 HOURS PRN
Status: DISCONTINUED | OUTPATIENT
Start: 2022-01-01 | End: 2022-01-01 | Stop reason: HOSPADM

## 2022-01-01 RX ORDER — POTASSIUM CHLORIDE 20 MEQ/1
40 TABLET, EXTENDED RELEASE ORAL
Status: DISCONTINUED | OUTPATIENT
Start: 2022-01-01 | End: 2022-01-01 | Stop reason: HOSPADM

## 2022-01-01 RX ORDER — LEVETIRACETAM 500 MG/1
500 TABLET ORAL 2 TIMES DAILY
Status: DISCONTINUED | OUTPATIENT
Start: 2022-01-01 | End: 2022-01-01 | Stop reason: HOSPADM

## 2022-01-01 RX ORDER — ASPIRIN 81 MG/1
81 TABLET ORAL DAILY
Qty: 30 TABLET | Refills: 3 | Status: SHIPPED | OUTPATIENT
Start: 2022-01-01

## 2022-01-01 RX ORDER — POLYETHYLENE GLYCOL 3350 17 G/17G
17 POWDER, FOR SOLUTION ORAL DAILY PRN
Status: DISCONTINUED | OUTPATIENT
Start: 2022-01-01 | End: 2022-01-01 | Stop reason: HOSPADM

## 2022-01-01 RX ORDER — PROPRANOLOL HCL 60 MG
60 CAPSULE, EXTENDED RELEASE 24HR ORAL DAILY
Qty: 30 CAPSULE | Refills: 2 | Status: SHIPPED | OUTPATIENT
Start: 2022-01-01

## 2022-01-01 RX ORDER — MAGNESIUM SULFATE IN WATER 40 MG/ML
4000 INJECTION, SOLUTION INTRAVENOUS ONCE
Status: COMPLETED | OUTPATIENT
Start: 2022-01-01 | End: 2022-01-01

## 2022-01-01 RX ADMIN — MAGNESIUM SULFATE HEPTAHYDRATE 4000 MG: 40 INJECTION, SOLUTION INTRAVENOUS at 20:52

## 2022-01-01 RX ADMIN — LISINOPRIL 20 MG: 20 TABLET ORAL at 11:33

## 2022-01-01 RX ADMIN — Medication 10 ML: at 09:41

## 2022-01-01 RX ADMIN — LORAZEPAM 1 MG: 2 INJECTION INTRAMUSCULAR; INTRAVENOUS at 05:48

## 2022-01-01 RX ADMIN — IOPAMIDOL 75 ML: 755 INJECTION, SOLUTION INTRAVENOUS at 19:16

## 2022-01-01 RX ADMIN — ENOXAPARIN SODIUM 40 MG: 100 INJECTION SUBCUTANEOUS at 09:35

## 2022-01-01 RX ADMIN — ASPIRIN 81 MG: 81 TABLET, COATED ORAL at 09:35

## 2022-01-01 RX ADMIN — ONDANSETRON 4 MG: 2 INJECTION INTRAMUSCULAR; INTRAVENOUS at 05:48

## 2022-01-01 RX ADMIN — POTASSIUM CHLORIDE 40 MEQ: 1500 TABLET, EXTENDED RELEASE ORAL at 09:35

## 2022-01-01 RX ADMIN — LORAZEPAM 2 MG: 2 INJECTION INTRAMUSCULAR; INTRAVENOUS at 11:33

## 2022-01-01 RX ADMIN — Medication 100 MG: at 09:35

## 2022-01-01 RX ADMIN — Medication 1 TABLET: at 09:35

## 2022-01-01 SDOH — ECONOMIC STABILITY: FOOD INSECURITY: WITHIN THE PAST 12 MONTHS, YOU WORRIED THAT YOUR FOOD WOULD RUN OUT BEFORE YOU GOT MONEY TO BUY MORE.: NEVER TRUE

## 2022-01-01 SDOH — ECONOMIC STABILITY: FOOD INSECURITY: WITHIN THE PAST 12 MONTHS, THE FOOD YOU BOUGHT JUST DIDN'T LAST AND YOU DIDN'T HAVE MONEY TO GET MORE.: NEVER TRUE

## 2022-01-01 ASSESSMENT — PATIENT HEALTH QUESTIONNAIRE - PHQ9
1. LITTLE INTEREST OR PLEASURE IN DOING THINGS: 0
SUM OF ALL RESPONSES TO PHQ9 QUESTIONS 1 & 2: 0
SUM OF ALL RESPONSES TO PHQ QUESTIONS 1-9: 0
2. FEELING DOWN, DEPRESSED OR HOPELESS: 0
SUM OF ALL RESPONSES TO PHQ QUESTIONS 1-9: 0
SUM OF ALL RESPONSES TO PHQ QUESTIONS 1-9: 0
SUM OF ALL RESPONSES TO PHQ9 QUESTIONS 1 & 2: 0
SUM OF ALL RESPONSES TO PHQ QUESTIONS 1-9: 0
1. LITTLE INTEREST OR PLEASURE IN DOING THINGS: 0
2. FEELING DOWN, DEPRESSED OR HOPELESS: 0

## 2022-01-01 ASSESSMENT — SOCIAL DETERMINANTS OF HEALTH (SDOH): HOW HARD IS IT FOR YOU TO PAY FOR THE VERY BASICS LIKE FOOD, HOUSING, MEDICAL CARE, AND HEATING?: NOT HARD AT ALL

## 2022-01-01 ASSESSMENT — PAIN - FUNCTIONAL ASSESSMENT: PAIN_FUNCTIONAL_ASSESSMENT: NONE - DENIES PAIN

## 2022-01-13 NOTE — PROGRESS NOTES
Subjective:      Chief complaint-cyst of the forehead and cyst of the scrotum    Patient ID: AIRAM Yang is a 51 y.o. male resents for evaluation of a cyst of the forehead and the scrotum    [facility]    Review of Systems    Objective:   Physical Exam  Constitutional:       Appearance: He is well-developed. HENT:      Head: Normocephalic and atraumatic. Right Ear: External ear normal.      Left Ear: External ear normal.   Eyes:      Conjunctiva/sclera: Conjunctivae normal.   Musculoskeletal:         General: Normal range of motion. Cervical back: Normal range of motion and neck supple. Skin:     General: Skin is warm and dry. Neurological:      Mental Status: He is alert and oriented to person, place, and time. Psychiatric:         Behavior: Behavior normal.     5 mm noninfected sebaceous cyst of the right forehead  Approximately 12 mm sebaceous cyst of the right scrotum    Assessment:      79-year-old male who presents for evaluation of sebaceous cysts of the right forehead and right side of the scrotum. Neither of the cyst have become infected. They do not cause discomfort. Plan: We have elected not to proceed with excision of sebaceous cysts of the forehead and the right scrotum. If the cysts are enlarging or become symptomatic, he will contact the office for follow-up appointment.         Lopez Burks MD

## 2022-07-20 NOTE — TELEPHONE ENCOUNTER
Called Pt, advised that I can provide a 30 day supply but there are no further refills without being seen. Has AWV scheduled for 8/3. Confirmed pharmacy of choice. Stated he understood without further questions.

## 2022-07-20 NOTE — TELEPHONE ENCOUNTER
Patient calling for medication refill. Patient has been out for a while. lisinopril (PRINIVIL;ZESTRIL) 10 MG tablet [[id number]]     Order Details  Dose, Route, Frequency: As Directed   Dispense Quantity: 30 tablet Refills: 12          Sig: TAKE 1 TABLET BY MOUTH EVERY DAY   [facility] [address] [phone].     Last OV 3/25/21  Next OV 8/3/22

## 2022-08-17 NOTE — PROGRESS NOTES
Alison Stanton  : 1975  Encounter date: 2022    This megan 49 y.o. male who presents with  Chief Complaint   Patient presents with    Annual Exam     No new concerns       History of present illness:    HPI History and Physical      Alison Stanton  YOB: 1975    Date of Service:  2022    Chief Complaint:   Alison Stanton is a 49 y.o. male who  presents for physical examination. HPI: Pt is 49year old male for yearly exam, due for labs, have not previously completed. Pt with significant ETOH abuse  history, seizure disorder and hypertension. Reports experienced seizure 2 weeks ago, reports not taking medication as prescribed. Reports not checking BP, waiting on BP cuff to arrive.     Wt Readings from Last 3 Encounters:   22 157 lb (71.2 kg)   22 155 lb (70.3 kg)   10/10/21 147 lb (66.7 kg)     BP Readings from Last 3 Encounters:   22 (!) 148/98   22 124/80   10/10/21 (!) 166/110       Patient Active Problem List   Diagnosis    Closed fracture of left ankle with routine healing    Alcohol withdrawal seizure without complication (HCC)    Alcohol withdrawal syndrome with complication ([address]    Alcohol dependence ([address]    Seizure ([address]    Acute metabolic encephalopathy    Hypokalemia    Electrolyte disorder    Seizure-like activity ([address]    Alcoholic encephalopathy ([address]    Alcohol withdrawal seizure, uncomplicated ([address]    Delirium tremens ([address]       Preventive Care:  Health Maintenance   Topic Date Due    COVID-19 Vaccine (1) Never done    Pneumococcal 0-64 years Vaccine (1 - PCV) Never done    HIV screen  Never done    Lipids  Never done    Colorectal Cancer Screen  Never done    Depression Screen  2022    Flu vaccine (1) 2022    DTaP/Tdap/Td vaccine (2 - Td or Tdap) 2029    Hepatitis C screen  Completed    Hepatitis A vaccine  Aged Out    Hepatitis B vaccine  Aged Out    Hib vaccine  Aged Out    Meningococcal (ACWY) vaccine  Aged Out Self-testicular exams: Yes  Sexual activity: none   Last eye exam: NA, recommended  Exercise: no regular exercise    Lipid panel:  No results found for: CHOL, TRIG, HDL, LDLCALC, LDLDIRECT    Living will : No, recommended    Immunization History   Administered Date(s) Administered    Influenza, FLUCELVAX, (age 10 mo+), MDCK, PF 10/17/2017    Influenza, Quadv, 6 mo and older, IM, PF (Flulaval, Fluarix) 12/12/2018       Allergies   Allergen Reactions    Seasonal      Outpatient Medications Marked as Taking for the 8/17/22 encounter (Office Visit) with MULUGETA Robbins NP   Medication Sig Dispense Refill    lisinopril (PRINIVIL;ZESTRIL) 20 MG tablet Take 1 tablet by mouth daily 30 tablet 3    levETIRAcetam (KEPPRA) 500 MG tablet TAKE 1 TABLET BY MOUTH TWICE A DAY 60 tablet 2    propranolol (INDERAL LA) 60 MG extended release capsule Take 1 capsule by mouth daily 30 capsule 2    Blood Pressure KIT 1 kit by Does not apply route daily 1 kit 0       Past Medical History:   Diagnosis Date    Alcohol withdrawal ([facility] 10/12/2017    Seizure ([facility]      Past Surgical History:   Procedure Laterality Date    WRIST SURGERY Right      Family History   Problem Relation Age of Onset    Cancer Mother     Cancer Father      Social History     Socioeconomic History    Marital status:      Spouse name: Not on file    Number of children: Not on file    Years of education: Not on file    Highest education level: Not on file   Occupational History    Not on file   Tobacco Use    Smoking status: Some Days     Packs/day: 0.50     Years: 31.00     Pack years: 15.50     Types: Cigarettes    Smokeless tobacco: Never   Vaping Use    Vaping Use: Never used   Substance and Sexual Activity    Alcohol use: Yes     Comment: 2 every other day     Drug use: Not Currently     Types: Marijuana Danielle Postin)     Comment: Occ    Sexual activity: Yes     Partners: Female   Other Topics Concern    Not on file   Social History Narrative Not on file     Social Determinants of Health     Financial Resource Strain: Not on file   Food Insecurity: Not on file   Transportation Needs: Not on file   Physical Activity: Not on file   Stress: Not on file   Social Connections: Not on file   Intimate Partner Violence: Not on file   Housing Stability: Not on file       Review of Systems:  A comprehensive review of systems was negative except for what was noted in the HPI. Physical Exam:   Vitals:    08/17/22 0954 08/17/22 1009   BP: (!) 150/104 (!) 148/98   Site: Right Upper Arm    Position: Sitting    Cuff Size: Medium Adult    Pulse: 88    Temp: 97.1 °F (36.2 °C)    TempSrc: Infrared    SpO2: 98%    Weight: 157 lb (71.2 kg)      Body mass index is 23.18 kg/m². Constitutional: He is oriented to person, place, and time. He appears well-developed and well-nourished. No distress. HEENT:   Head: Normocephalic and atraumatic. Right Ear: Tympanic membrane, external ear and ear canal normal.   Left Ear: Tympanic membrane, external ear and ear canal normal.   Nose: Nose normal.   Mouth/Throat: Oropharynx is clear and moist and mucous membranes are normal. No oropharyngeal exudate or posterior oropharyngeal erythema. He has no cervical adenopathy. Eyes: Conjunctivae and extraocular motions are normal. Pupils are equal, round, and reactive to light. Neck: Full passive range of motion without pain. Neck supple. No JVD present. Carotid bruit is not present. No mass and no thyromegaly present. Cardiovascular: Normal rate, regular rhythm, normal heart sounds and intact distal pulses. Exam reveals no gallop and no friction rub. No murmur heard. Pulmonary/Chest: Effort normal and breath sounds normal. No respiratory distress. He has no wheezes, rhonchi or rales. Abdominal: Soft, non-tender. Bowel sounds and aorta are normal. There is no organomegaly, mass or bruit. Genitourinary:  rectal not indicated by age criteria and lack of symptoms.   Musculoskeletal: Normal range of motion, no synovitis. He exhibits no edema. Neurological: He is alert and oriented to person, place, and time. He has normal reflexes. tremors  Skin: Skin is warm, dry and intact. No suspicious lesions are noted. Psychiatric: He has a normal mood and affect. His speech is normal and behavior is normal. Judgment, cognition and memory are normal.     Assessment/Plan:    Richard Holliday was seen today for annual exam.    Diagnoses and all orders for this visit:    Preventative health care  -     CBC with Auto Differential; Future  -     Comprehensive Metabolic Panel, Fasting; Future  -     Lipid, Fasting; Future  -     Hepatitis C Antibody; Future  -     HIV Screen; Future  -     Vitamin D 25 Hydroxy; Future    Seizure disorder (HCC)  -     Levetiracetam Level; Future  -     Magnesium; Future  -     levETIRAcetam (KEPPRA) 500 MG tablet; TAKE 1 TABLET BY MOUTH TWICE A DAY    Essential hypertension  -     Lipid, Fasting; Future  -     Microalbumin / Creatinine Urine Ratio; Future  -     TSH with Reflex; Future  -     Magnesium; Future  -     lisinopril (PRINIVIL;ZESTRIL) 20 MG tablet; Take 1 tablet by mouth daily  -     Discontinue: amLODIPine (NORVASC) 5 MG tablet; Take 1 tablet by mouth daily  -     propranolol (INDERAL LA) 60 MG extended release capsule; Take 1 capsule by mouth daily    Essential tremor  -     propranolol (INDERAL LA) 60 MG extended release capsule; Take 1 capsule by mouth daily        Current Outpatient Medications on File Prior to Visit   Medication Sig Dispense Refill    Blood Pressure KIT 1 kit by Does not apply route daily 1 kit 0     No current facility-administered medications on file prior to visit.       Allergies   Allergen Reactions    Seasonal      Past Medical History:   Diagnosis Date    Alcohol withdrawal ([facility] 10/12/2017    Seizure ([facility]       Past Surgical History:   Procedure Laterality Date    WRIST SURGERY Right       Family History   Problem Relation Age of Onset Cancer Mother     Cancer Father       Social History     Tobacco Use    Smoking status: Some Days     Packs/day: 0.50     Years: 31.00     Pack years: 15.50     Types: Cigarettes    Smokeless tobacco: Never   Substance Use Topics    Alcohol use: Yes     Comment: 2 every other day         Review of Systems    Objective:    BP (!) 148/98   Pulse 88   Temp 97.1 °F (36.2 °C) (Infrared)   Wt 157 lb (71.2 kg)   SpO2 98%   BMI 23.18 kg/m²   Weight: 157 lb (71.2 kg)     BP Readings from Last 3 Encounters:   08/17/22 (!) 148/98   01/13/22 124/80   10/10/21 (!) 166/110     Wt Readings from Last 3 Encounters:   08/17/22 157 lb (71.2 kg)   01/13/22 155 lb (70.3 kg)   10/10/21 147 lb (66.7 kg)     BMI Readings from Last 3 Encounters:   08/17/22 23.18 kg/m²   01/13/22 22.89 kg/m²   10/10/21 21.71 kg/m²       Physical Exam    Assessment/Plan    1. Preventative health care  Advised routine dental and vision  Increased exercise, healthy diet   Advised living will  - CBC with Auto Differential; Future  - Comprehensive Metabolic Panel, Fasting; Future  - Lipid, Fasting; Future  - Hepatitis C Antibody; Future  - HIV Screen; Future  - Vitamin D 25 Hydroxy; Future    2. Seizure disorder ([facility] [address]  Uncontrolled  Advised take medication as prescribed  Advised ETOH cessatin  - Levetiracetam Level; Future  - Magnesium; Future  - levETIRAcetam (KEPPRA) 500 MG tablet; TAKE 1 TABLET BY MOUTH TWICE A DAY  Dispense: 60 tablet; Refill: 2    3. Essential hypertension  Uncontrolled  - Lipid, Fasting; Future  - Microalbumin / Creatinine Urine Ratio; Future  - TSH with Reflex; Future  - Magnesium; Future  - lisinopril (PRINIVIL;ZESTRIL) 20 MG tablet; Take 1 tablet by mouth daily  Dispense: 30 tablet; Refill: 3  - propranolol (INDERAL LA) 60 MG extended release capsule; Take 1 capsule by mouth daily  Dispense: 30 capsule; Refill: 2    4. Essential tremor  Advised ETOH cessation  - propranolol (INDERAL LA) 60 MG extended release capsule;  Take 1 capsule by mouth daily  Dispense: 30 capsule; Refill: 2    Return in about 1 month (around 9/17/2022) for lab review, medication re-check, hypertension re-check. This dictation was generated by voice recognition computer software. Although all attempts are made to edit the dictation for accuracy, there may be errors in the transcription that are not intended.

## 2022-08-18 PROBLEM — I10 HTN (HYPERTENSION): Status: ACTIVE | Noted: 2022-01-01

## 2022-08-18 PROBLEM — I63.9 ACUTE ISCHEMIC STROKE (HCC): Status: ACTIVE | Noted: 2022-01-01

## 2022-08-18 PROBLEM — G25.0 ESSENTIAL TREMOR: Status: ACTIVE | Noted: 2022-01-01

## 2022-08-18 NOTE — ED PROVIDER NOTES
[address] PROVIDER NOTE    Patient Identification  Pt Name: iRchard Forbes  MRN: [id number]  Venancio 1975  Date of evaluation: 8/18/2022  Provider: Chris Montana MD  PCP: Celedonio Lundborg, APRN - [facility]    Chief Complaint  Facial Droop (Came by [facility] from home- pt stated had possible seizure- when squad arrived noticed left facial droop with slurred speech. A&O)      HPI  History provided by patient   This is a 49 y.o. male who presents to the ED for possible seizure-like activity. This occurred at around 1 PM.  Remembers the entire event. [place] like he couldn't move anything in his body. No shaking. He can usually feel this coming. He has a history of epilepsy. No recent medication changes. Usually has tonic-clonic seizures. Today he thinks he had this occur. After the seizure he had generalized weakness and could not even get off the ground. His ex-wife came to his house and at 3 PM noticed that his face was drooping. He does not know whether or not he had facial drooping before that. She convinced him to call an ambulance. When the ambulance arrived, they noted that he had slurred speech. His speech has actually improved greatly. His strength is also improved but he is having difficulty getting up. No nausea or vomiting or fevers. No pain. No recent illness. ROS  12 systems reviewed, pertinent positives/negatives per HPI otherwise noted to be negative.     I have reviewed the following nursing documentation:  Allergies: Seasonal    Past medical history:   Past Medical History:   Diagnosis Date    Alcohol withdrawal ([facility] [address] 10/12/2017    Seizure ([facility] [address]      Past surgical history:   Past Surgical History:   Procedure Laterality Date    WRIST SURGERY Right        Home medications:   Previous Medications    BLOOD PRESSURE KIT    1 kit by Does not apply route daily    LEVETIRACETAM (KEPPRA) 500 MG TABLET    TAKE 1 TABLET BY MOUTH TWICE A DAY LISINOPRIL (PRINIVIL;ZESTRIL) 20 MG TABLET    Take 1 tablet by mouth daily    PROPRANOLOL (INDERAL LA) 60 MG EXTENDED RELEASE CAPSULE    Take 1 capsule by mouth daily       Social history:  reports that he has been smoking. He has a 15.50 pack-year smoking history. He has never used smokeless tobacco. He reports current alcohol use. He reports that he does not currently use drugs after having used the following drugs: Marijuana Nieveselizabeth Barnett). Family history:    Family History   Problem Relation Age of Onset    Cancer Mother     Cancer Father          Exam  ED Triage Vitals [08/18/22 1908]   BP Temp Temp Source Heart Rate Resp SpO2 Height Weight   126/88 97.7 °F (36.5 °C) Oral (!) 110 20 100 % 5' 9\" (1.753 m) 157 lb (71.2 kg)     Nursing note and vitals reviewed. Constitutional: In no acute distress  HENT:      Head: Normocephalic      Ears: External ears normal.      Nose: Nose normal.     Mouth: Membrane mucosa moist   Eyes: No discharge. Neck: Supple. Trachea midline. Cardiovascular: Regular rate. Warm extremities  Pulmonary/Chest: Effort normal. No respiratory distress. Abdominal: Soft. No distension. Nontender  : Deferred  Rectal: Deferred   Musculoskeletal: Moves all extremities. No gross deformity. Neurological: Alert and oriented. Face symmetric. Speech is slurred but I can understand 100% of what he is saying. There is mild facial droop on the left side. No drift on his extremities. NIH stroke scale 2..  Skin: Warm and dry. Psychiatric: Normal mood and affect. Behavior is normal.    Procedures      EKG  The Ekg interpreted by me in the absence of a cardiologist shows. Normal sinus rhythm. No specific ST changes appreciated. HR 98  No significant change from prior EKG dated 7/20        Radiology  CTA HEAD NECK W CONTRAST   Preliminary Result   No evidence of large vessel occlusion, significant stenosis, or aneurysmal   dilation in the major arteries of the head and neck.          CT HEAD WO CONTRAST   Preliminary Result   1. Involutional parenchymal changes with multiple scattered lacunar infarcts   present in the bilateral basal ganglia, corona radiata, and centrum   semiovale. Findings are presumed to be chronic; however, some appear new   compared to 07/08/2020. If clinical concerns persist, can consider further   evaluation with MRI if no contraindications. 2. No acute intracranial hemorrhage. Preliminary findings discussed with Dr. Dorita Crafword on 08/18/2022, at 7:27 p.m. XR CHEST PORTABLE    (Results Pending)       Labs  Results for orders placed or performed during the hospital encounter of 08/18/22   CBC with Auto Differential   Result Value Ref Range    WBC 5.8 4.0 - 11.0 K/uL    RBC 3.34 (L) 4.20 - 5.90 M/uL    Hemoglobin 11.4 (L) 13.5 - 17.5 g/dL    Hematocrit 34.2 (L) 40.5 - 52.5 %    .5 (H) 80.0 - 100.0 fL    MCH 34.2 (H) 26.0 - 34.0 pg    MCHC 33.3 31.0 - 36.0 g/dL    RDW 13.9 12.4 - 15.4 %    Platelets 364 084 - 837 K/uL    MPV 7.6 5.0 - 10.5 fL    Neutrophils % 50.5 %    Lymphocytes % 33.8 %    Monocytes % 14.1 %    Eosinophils % 0.7 %    Basophils % 0.9 %    Neutrophils Absolute 2.9 1.7 - 7.7 K/uL    Lymphocytes Absolute 2.0 1.0 - 5.1 K/uL    Monocytes Absolute 0.8 0.0 - 1.3 K/uL    Eosinophils Absolute 0.0 0.0 - 0.6 K/uL    Basophils Absolute 0.1 0.0 - 0.2 K/uL   Protime-INR   Result Value Ref Range    Protime 12.3 11.7 - 14.5 sec    INR 0.92 0.87 - 1.14   EKG 12 Lead   Result Value Ref Range    Ventricular Rate 98 BPM    Atrial Rate 98 BPM    P-R Interval 168 ms    QRS Duration 88 ms    Q-T Interval 394 ms    QTc Calculation (Bazett) 503 ms    P Axis 43 degrees    R Axis 69 degrees    T Axis 44 degrees    Diagnosis       Normal sinus rhythmPossible Left atrial enlargementAnterior infarct , age undeterminedProlonged QTAbnormal ECG       Screenings  NIH Stroke Scale  Interval: Baseline  Level of Consciousness (1a): Alert  LOC Questions (1b):  Answers both correctly  LOC Commands (1c): Performs both tasks correctly  Best Gaze (2): Normal  Visual (3): No visual loss  Facial Palsy (4): (!) Minor paralysis  Motor Arm, Left (5a): No drift  Motor Arm, Right (5b): No drift  Motor Leg, Left (6a): No drift  Motor Leg, Right (6b): No drift  Limb Ataxia (7): Absent  Sensory (8): Normal  Best Language (9): No aphasia  Dysarthria (10): Mild to moderate, slurs some words  Extinction and Inattention (11): No abnormality  Total: 2Glasgow Coma Scale  Eye Opening: Spontaneous  Best Verbal Response: Oriented  Best Motor Response: Obeys commands  Bertin Coma Scale Score: 15       MDM and ED Course  This is a 49 y.o. male who presents to the ED for possible seizure like activity followed by slurred speech, left-sided facial droop. The slurred speech seems to be improving and I can understand 100% of what he saying. I do not believe that the symptoms are disabling. Last known normal was likely around 1 PM.  May have Bobo's paralysis. Because of this, I do not believe that the patient should receive tPA. Regardless, stroke is in differential and we activated a stroke alert on patient's arrival.  Will check for other inciting factors such as hypoglycemia. Discussed with  stroke team Dr Khushi Leonard. Given prolonged post ictal time (6.5 hrs), he believes this is worthwhile to pursue stroke workup. Agrees shouldn't receive tpa given time course, improving symptoms, and non disabling. Multiple old lacunar strokes on CT, some new compared to prior. The total critical care time spent while evaluating and treating this patient was at least 32 minutes. This excludes time spent doing separately billable procedures. This includes time at the bedside, data interpretation, medication management, obtaining critical history from collateral sources if the patient is unable to provide it directly, and physician consultation.   Specifics of interventions taken and potentially life-threatening diagnostic considerations are listed above in the medical decision making. [unfilled]    Is this patient to be included in the SEP-1 Core Measure due to severe sepsis or septic shock? No   Exclusion criteria - the patient is NOT to be included for SEP-1 Core Measure due to: Infection is not suspected        Final Impression  1. Facial droop    2. Dysarthria        Blood pressure 111/79, pulse 96, temperature 97.7 °F (36.5 °C), temperature source Oral, resp. rate 14, height 5' 9\" (1.753 m), weight 157 lb (71.2 kg), SpO2 97 %. Disposition:  DISPOSITION Decision To Admit 08/18/2022 07:53:04 PM      Patient Referrals:  No follow-up provider specified. Discharge Medications:  New Prescriptions    No medications on file       Discontinued Medications:  Discontinued Medications    No medications on file       This chart was generated using the [employer] dictation system. I created this record but it may contain dictation errors given the limitations of this technology.         Erasmo Salmon MD  08/18/22 2049

## 2022-08-19 NOTE — CARE COORDINATION
CM reviewed chart at this time. Pt from home, has insurance and PCP. Here for stroke like symptoms. Therapy evaluations are pending at this time. CM will follow for d/c plan.       Bridget Hair RN, BSN  [phone]

## 2022-08-19 NOTE — PROGRESS NOTES
[address] Department   Phone: [phone]    Occupational Therapy    [x] Initial Evaluation            [] Daily Treatment Note         [x] Discharge Summary      Patient: Tessie Clarke   : 1975   MRN: [id number]   Date of Service:  2022    Admitting Diagnosis:  Acute ischemic stroke [facility])  Current Admission Summary: The pt was admitted with seizure like activity and facial droop. Pt drinks alcohol. MRI showed a R basal ganglia infarct. Past Medical History:  has a past medical history of Acute ischemic stroke ([facility] [address] Alcohol withdrawal ([facility] [address] Essential tremor, HTN (hypertension), and Seizure ([facility] [address] Past Surgical History:  has a past surgical history that includes Wrist surgery (Right). Discharge Recommendations: Tessie Clarke scored a 23/24 on the -[facility] ADL Inpatient form. At this time, no further OT is recommended upon discharge due to patient at independent level. Recommend patient returns to prior setting with prior services. DME Required For Discharge: no DME required at discharge    Precautions/Restrictions: high fall risk    Pre-Admission Information   Lives With: alone    Type of Home: house  Home Layout: two level, laundry in basement, 1/2 bath on main level, bedroom/bathroom upstairs  Home Access:  1 step to enter without rails   Bathroom Layout: tub/shower unit  Bathroom Equipment: . Comment: none  Toilet Height: standard height  Home Equipment: . Comment: blood pressure machine   Transfer Assistance: Independent without use of device  Ambulation Assistance:Independent without use of device  ADL Assistance: independent with all ADL's  IADL Assistance: independent with homemaking tasks  Active :        [x] Yes  [] No  Hand Dominance: [] Left  [x] Right  Current Employment: full time employment.   Occupation: metal fabrication   Hobbies: watch tv   Recent Falls: no falls     Examination   Vision:   Vision Gross Assessment: WFL  Hearing:   WFL  Observation:   General Observation:  tremors in [place]. Patient stated this happens when he is not drinking but resolves quickly and does not cause balance impairment   Sensation:   denies numbness and tingling  ROM:   (B) UE AROM WFL  Strength:   (B) UE strength grossly WFL    Decision Making: low complexity  Clinical Presentation: stable    Subjective  General: Patient in bed upon arrival, agreeable to OT/PT evaluation. Patient reported no concerns with returning home. Pain: 0/10  Pain Interventions: not applicable        Activities of Daily Living  Basic Activities of Daily Living  Lower Extremity Dressing: Independent  General Comments: declined other ADLs  Instrumental Activities of Daily Living  No IADL completed on this date. Functional Mobility  Bed Mobility  Supine to Sit: modified independent  Sit to Supine: modified independent  Scooting: modified independent  Comments:  Transfers  Sit to stand transfer:Independent  Stand to sit transfer: Independent  Comments:  Functional Mobility:  Sitting Balance: Independent. Standing Balance: Independent. Standing Balance Comment: functional mobility and standing balance  Functional Mobility: .   Independent  Functional Mobility Activity: 400 ft  Functional Mobility Device Use: no device  Functional Mobility Comment: no LOB noted, slow pace     Other Therapeutic Interventions    Functional Outcomes  AM-PAC Inpatient Daily Activity Raw Score: 23    Cognition  WFL  Orientation:    alert and oriented x 4  Command Following:   [facility]     Education  Barriers To Learning: none  Patient Education: patient educated on OT role and benefits, discharge recommendations  Learning Assessment:  patient verbalizes and demonstrates understanding    Assessment  Activity Tolerance: tolerated well   Impairments Requiring Therapeutic Intervention: none - eval with same day discharge  Prognosis: good  Clinical Assessment: Patient presents at independent level and no further OT services are indicated at this time. Patient is safe to return home. Education provided on taking some time off work and having assistance for driving and IADLs initially when he returns home. Pt verbalized understanding   Safety Interventions: patient left in bed, bed alarm in place, call light within reach, and nurse notified    Plan  Frequency: Eval with same day discharge. No follow up required. Current Treatment Recommendations: not applicable, evaluation completed with same day discharge. Goals  Patient eval with same day discharge. No goals set as patient is at baseline status.     Therapy Session Time     Individual Group Co-treatment   Time In    3633   Time Out    1139   Minutes    23        Timed Code Treatment Minutes:  Timed Code Treatment Minutes: 8 Minutes  Total Treatment Minutes:  23 minutes        Electronically Signed By: Elina Emanuel, Hung Blanco, TRU OTR/L [id number]

## 2022-08-19 NOTE — H&P
Hospital Medicine History & Physical      PCP: MULUGETA Galarza NP    Date of Admission: 8/18/2022    Date of Service: Pt seen/examined on 08/18/22 and Admitted to Inpatient with expected LOS greater than two midnights due to medical therapy. Chief Complaint:  bilateral leg weakness and seizure like activity       History Of Present Illness:  Jeffry Almazan is 49 y.o. male who presented with complaint of bilateral leg weakness. Symptom onset was acute for a time period of 1 day. The severity is described as severe. The course of his symptoms over time is resolved. The symptoms improved with none and worsened with none. The patient's symptom is associated with slurred speech and left facial droop. Jeffry Almazan is 49 y.o. 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 He also felt his upper body is weak and unable to move it. He thought it was the seizure but denies episode of involuntary shaking or seizure like movement  He was awake and aware of his environment   His ex wife came to his house around 3 pm and noticed that his face on the left was dropping and also had some slurred speech  On interview, there is no slurred speech or left facial droop. He is able to move all extremities.    In the ED, CT head concerning for new ischemic stroke         Past Medical History:          Diagnosis Date    Acute ischemic stroke ([facility] [address] 8/18/2022    Alcohol withdrawal ([facility] [address] 10/12/2017    Essential tremor 8/18/2022    HTN (hypertension) 8/18/2022    Seizure [facility])        Past Surgical History:          Procedure Laterality Date    WRIST SURGERY Right        Medications Prior to Admission:      Prior to Admission medications    Medication Sig Start Date End Date Taking? Authorizing Provider   lisinopril (PRINIVIL;ZESTRIL) 20 MG tablet Take 1 tablet by mouth daily 8/17/22   MULUGETA Aguirre NP   levETIRAcetam (KEPPRA) 500 MG tablet TAKE 1 TABLET BY MOUTH TWICE A DAY 8/17/22   MULUGETA Hobbs NP   propranolol (INDERAL LA) 60 MG extended release capsule Take 1 capsule by mouth daily 8/17/22   MULUGETA Aguirre NP   Blood Pressure KIT 1 kit by Does not apply route daily 2/2/21   MULUGETA oHbbs NP       Allergies:  Seasonal    Social History:      The patient currently lives at home alone    TOBACCO:   reports that he has been smoking. He has a 15.50 pack-year smoking history. He has never used smokeless tobacco.  ETOH:   reports current alcohol use. E-cigarette/Vaping       Questions Responses    E-cigarette/Vaping Use Never User    Start Date     Passive Exposure     Quit Date     Counseling Given     Comments               Family History:      Reviewed and negative in regards to presenting illness/complaint. Problem Relation Age of Onset    Cancer Mother     Cancer Father        REVIEW OF SYSTEMS COMPLETED:   Pertinent positives as noted in the HPI. All other systems reviewed and negative. PHYSICAL EXAM PERFORMED:    /89   Pulse 85   Temp 97.7 °F (36.5 °C) (Oral)   Resp 18   Ht 5' 9\" (1.753 m)   Wt 157 lb (71.2 kg)   SpO2 94%   BMI 23.18 kg/m²     General appearance:  No apparent distress, appears stated age and cooperative. HEENT:  Normal cephalic, atraumatic without obvious deformity. Pupils equal, round, and reactive to light. Extra ocular muscles intact. Conjunctivae/corneas clear. Neck: Supple, with full range of motion. No jugular venous distention. Trachea midline. Respiratory:  Normal respiratory effort. Clear to auscultation, bilaterally without Rales/Wheezes/Rhonchi.   Cardiovascular:  Regular rate and rhythm with normal S1/S2 without murmurs, rubs or gallops. Abdomen: Soft, non-tender, non-distended with normal bowel sounds. Musculoskeletal:  No clubbing, cyanosis or edema bilaterally. Full range of motion without deformity. Skin: Skin color, texture, turgor normal.  No rashes or lesions. Neurologic:  Neurovascularly intact without any focal sensory/motor deficits. Cranial nerves: II-XII intact, grossly non-focal.  Psychiatric:  Alert and oriented, thought content appropriate, normal insight  Capillary Refill: Brisk,3 seconds, normal  Peripheral Pulses: +2 palpable, equal bilaterally       Labs:     Recent Labs     08/18/22 1923   WBC 5.8   HGB 11.4*   HCT 34.2*        Recent Labs     08/18/22 1923   *   K 3.3*   CL 91*   CO2 23   BUN 12   CREATININE 0.9   CALCIUM 8.4     Recent Labs     08/18/22 1923   *   ALT 90*   BILITOT 0.9   ALKPHOS 53     Recent Labs     08/18/22 1923   INR 0.92     Recent Labs     08/18/22 1923   CKTOTAL 383*   TROPONINI <0.01       Urinalysis:      Lab Results   Component Value Date/Time    NITRU Negative 07/08/2020 12:47 AM    WBCUA 6 07/08/2020 12:47 AM    RBCUA 3 07/08/2020 12:47 AM    BLOODU LARGE 07/08/2020 12:47 AM    SPECGRAV >1.030 07/08/2020 12:47 AM    GLUCOSEU Negative 07/08/2020 12:47 AM       Radiology:     CXR: I have reviewed the CXR with the following interpretation: No acute cardiopulmonary process  EKG:  I have reviewed the EKG with the following interpretation:     XR CHEST PORTABLE   Final Result   1. No radiographic evidence of an acute cardiopulmonary process. 2. Subacute versus chronic fracture deformity of the right 8th posterolateral   rib, new compared to 07/31/2019. CTA HEAD NECK W CONTRAST   Final Result   No evidence of large vessel occlusion, significant stenosis, or aneurysmal   dilation in the major arteries of the head and neck. CT HEAD WO CONTRAST   Final Result   1.  Involutional parenchymal changes with multiple scattered lacunar infarcts   present in the bilateral basal ganglia, corona radiata, and centrum   semiovale. Findings are presumed to be chronic; however, some appear new   compared to 07/08/2020. If clinical concerns persist, can consider further   evaluation with MRI if no contraindications. 2. No acute intracranial hemorrhage. Preliminary findings discussed with Dr. Vida Florez on 08/18/2022, at 7:27 p.m. MRI brain without contrast    (Results Pending)       Consults:    IP CONSULT TO PHARMACY  PHARMACY TO CHANGE BASE FLUIDS  IP CONSULT TO HOSPITALIST  IP CONSULT TO NEUROLOGY    ASSESSMENT and PLAN:    Acute ischemic stroke versus TIA   MRI brain without contrast ordered. Check lipids and hemoglobin A1c  I have started him on aspirin and Lipitor  Consult PT/OT and speech therapy  Consult neurology if MRI is abnormal or there is evidence of acute stroke    History of seizure disorder  Continue Keppra per his usual home dose  Monitor and replace electrolytes including magnesium and potassium    Active and ongoing alcohol abuse  Daily thiamine and multivitamin as ordered  Ativan PO PRN for withdrawal symptoms or agitation  If withdrawal is significant then will start CIWA protocol    Hypertension  Blood pressure is reasonably low  Therefore for now we will hold his home dose of lisinopril avoid iatrogenic hypotension     Essential tremor   Okay to continue propranolol which she takes at home    Tobacco use disorder  Nicotine patch ordered per patient request        DVT Prophylaxis: Lovenox   Diet: Diet NPO  Code Status: Full Code    PT/OT Eval Status: PT/OT consult is ordered    Dispo - admit to telemetry as inpatient       Donna Brito MD    Thank you MULUGETA Lopez - CASIMIRO for the opportunity to be involved in this patient's care. If you have any questions or concerns please feel free to contact me at [phone].

## 2022-08-19 NOTE — PROGRESS NOTES
Facility/Department: [address]  Initial Assessment  DYSPHAGIA BEDSIDE SWALLOW EVALUATION     Patient: Naheed Mandel   : 1975   MRN: [id number]      Evaluation Date: 2022   Admitting Diagnosis: Dysarthria [R47.1]  Facial droop [R29.810]  Acute ischemic stroke ([facility] [address] [I63.9]  Pain: Did not state                                                       H&P: Naheed Mandel is 49 y.o. male who presented with complaint of bilateral leg weakness. Symptom onset was acute for a time period of 1 day. The severity is described as severe. The course of his symptoms over time is resolved. The symptoms improved with none and worsened with none. The patient's symptom is associated with slurred speech and left facial droop. Naheed Mandel is 49 y.o. 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 He also felt his upper body is weak and unable to move it. He thought it was the seizure but denies episode of involuntary shaking or seizure like movement  He was awake and aware of his environment  His ex wife came to his house around 3 pm and noticed that his face on the left was dropping and also had some slurred speech  On interview, there is no slurred speech or left facial droop. He is able to move all extremities. In the ED, CT head concerning for new ischemic stroke     Imaging:  Chest X-ray:   1. No radiographic evidence of an acute cardiopulmonary process. 2. Subacute versus chronic fracture deformity of the right 8th posterolateral   rib, new compared to 2019. MRI:  1. Punctate acute infarct involving the lateral aspect of the right basal   ganglia. No mass effect or midline shift. 2. Otherwise, no acute intracranial abnormality.    3. Mild-to-moderate global parenchymal volume loss with mild chronic   microvascular ischemic changes. 4. Tiny chronic infarcts involving the corona radiata bilaterally. No Prior Fiberoptic Endoscopic Evaluation of Swallowing (FEES) or Modified Barium Swallow Study (MBSS)    History/Prior Level of Function:   Living Status: Lives alone  Prior Dysphagia History: 7/12/2020-7/17/2020: Pt recommended for regular textures with thin liquids, no straws, medications crushed in puree. Pt recommended to have assistance with feeding, ensure Pt takes small drinks. Reason for referral: SLP evaluation orders received due to CVA protocol , failed 3oz water swallow screen , and prior hx of dysphagia     Dysphagia Impressions/Diagnosis: Oropharyngeal Dysphagia   Pt alert and cooperative, seen for ST in his room. Pt is currently on room air and reports that he was previously eating a regular/thin diet. Pt presents with fairly symmetrical facial features with generalized weakness but lingual/labial ROM and coordination within functional limits, determined via oral mechanism exam. Pt presents with decreased laryngeal elevation/excursion, determined via palpation. Pt was able to feed himself throughout the evaluation but appears to have some discoordination, suspect he will benefit from mild assistance/meal set up. Pt presented with the following PO trials: ice chips, thin liquids, puree, soft solid and a regular texture. The pt demonstrates prolonged mastication of each texture and mild oral residue of the regular texture, cleared with liquid wash. The pt demonstrated a delayed throat clear x2 following consecutive sips of thin liquids via cup rim but had no additional overt signs/symptoms of penetration/aspiration. Recommend continuation of the pt's current diet-- regular textures with thin liquids with strict aspiration precautions.  Recommend close diet tolerance monitoring, if pt demonstrates difficulties recommend downgrade to NPO with ongoing swallow assessment. Recommended Diet and Intervention 8/19/2022:  Diet Solids Recommendation:  Regular texture diet  Liquid Consistency Recommendation: Thin liquids, No straws  Recommended form of Meds: Whole with water or Meds in puree         Compensatory Swallowing Strategies:  Upright as possible with all PO intake , Assist Feed , Small bites/sips , Eat/feed slowly    SHORT TERM DYSPHAGIA GOALS/PLAN OF CARE: Speech therapy for dysphagia tx 1-2 times to ensure diet tolerance.   Pt will functionally tolerate recommended diet with no overt clinical s/s of aspiration   Pt will demonstrate understanding of aspiration risk and precautions via education/demonstration with occasional prompting    Dysphagia Therapeutic Intervention:  Diet Tolerance Monitoring , Patient/Family Education     Discharge Recommendations: Discharge recommendations to be determined pending ongoing follow-up during acute care stay    Patient Positioning: Upright in bed     Current Diet Level (prior to evaluation): Regular texture diet  Thin liquids, No straws      Respiratory Status:   [x]Room Air   []O2 via nasal cannula   []Other:    Dentition:  [x]Adequate  []Dentures   []Missing Many Teeth  []Edentulous  []Other:    Baseline Vocal Quality:  [x]Normal  []Dysphonic   []Aphonic   []Hoarse  []Wet  []Weak  []Other:    Volitional Cough:  [x]Strong  []Weak  []Wet  []Absent  []Congested  []Other:    Volitional Swallow:   []Absent   []Delayed     [x]Adequate     [x]Required use of drink     Oral Mechanism Exam:  [x]WFL []Mild   [] Moderate  []Severe  []To be assessed  Impaired:   []Left side      []Right side    []Labial ROM/Coordination    []Labial Symmetry   []Lingual ROM/Coordination   []Lingual Symmetry  []Gag  []Other:     Oral Phase: []WFL [x]Mild   [] Moderate  []Severe  []To be assessed   [x]Impaired/Prolonged Mastication:   []Oral Holding:   []Spillage Left:   []Spillage Right:  []Pocketing Left:   []Pocketing Right: [x]Decreased Anterior to Posterior Transit:   [x]Suspected Premature Bolus Loss:   [x]Lingual/Palatal Residue: mild, cleared with liquid wash   []Other:     Pharyngeal Phase: []WFL [x]Mild   [] Moderate  []Severe  []To be assessed   [x]Delayed Swallow:   []Suspected Pharyngeal Pooling:   [x]Decreased Laryngeal Elevation:   []Absent Swallow:  []Wet Vocal Quality:   []Throat Clearing-Immediate:   [x]Throat Clearing-Delayed: x2   []Cough-Immediate:   []Cough-Delayed:  []Change in Vital Signs:  []Suspected Delayed Pharyngeal Clearing:  []Other:       Eating Assistance:  []Independent  [x]Setup or clean-up assistance   [] Supervision or touching assistance   [] Partial or moderate assistance   [] Substantial or maximal assistance  [] Dependent       EDUCATION:   Provided education regarding role of SLP, results of assessment, recommendations and general speech pathology plan of care. [x] Pt verbalized understanding and agreement   [] Pt requires ongoing learning   [] No evidence of comprehension     If patient discharges prior to next visit, this note will serve as discharge. Timed Code Minutes: 0  Total Treatment Minutes: 11 minutes    Electronically signed by:    Chaney Olszewski, M.S. [place] [place] [address][id number][id number] [facility]

## 2022-08-19 NOTE — PROGRESS NOTES
[address] Department   Phone: [phone]    Physical Therapy    [x] Initial Evaluation            [] Daily Treatment Note         [x] Discharge Summary      Patient: Charly Vela   : 1975   MRN: [id number]   Date of Service:  2022  Admitting Diagnosis: Acute ischemic stroke [facility])  Current Admission Summary: The pt was admitted with seizure like activity and facial droop. Pt drinks alcohol. MRI showed a R basal ganglia infarct. Past Medical History:  has a past medical history of Acute ischemic stroke ([facility] [address] Alcohol withdrawal ([facility] [address] Essential tremor, HTN (hypertension), and Seizure ([facility] [address] Past Surgical History:  has a past surgical history that includes Wrist surgery (Right). Discharge Recommendations: Charly Vela scored a 24/24 on the AM-PAC short mobility form. At this time, no further PT is recommended upon discharge due to patient at independent level. Recommend patient returns to prior setting with prior services. DME Required For Discharge: No new DME required  Precautions/Restrictions: high fall risk    Pre-Admission Information   Lives With: alone                     Type of Home: house  Home Layout: two level, laundry in basement, 1/2 bath on main level, bedroom/bathroom upstairs  Home Access:  1 step to enter without rails   Bathroom Layout: tub/shower unit  Bathroom Equipment: . Comment: none  Toilet Height: standard height  Home Equipment: . Comment: blood pressure machine   Transfer Assistance: Independent without use of device  Ambulation Assistance:Independent without use of device  ADL Assistance: independent with all ADL's  IADL Assistance: independent with homemaking tasks  Active :        [x] Yes                 [] No  Hand Dominance: [] Left                 [x] Right  Current Employment: full time employment.   Occupation: metal [employer]   Hobbies: watch tv   Recent Falls: no falls     Examination   Vision:   Vision Gross Assessment: WFL  Hearing:   WFL    Posture:   good  Sensation:   WFL  ROM:   (B) LE ROM WFL  Strength:   (B) LE gross strength WFL    Coordination:    Tremors in UEs and LEs, pt stated this happens anytime he is not drinking, pt stated it will resolve quickly, did not cause balance impairment     Decision Making: low complexity  Clinical Presentation: stable      Subjective  General: The pt was supine in bed upon PT arrival, agreeable to PT, reported chronic L sciatica   Pain: 0/10  Pain Interventions: not applicable       Functional Mobility  Bed Mobility  Supine to Sit: modified independent  Sit to Supine: modified independent  Scooting: modified independent  Comments:  Transfers  Sit to stand transfer: Independent  Stand to sit transfer: Independent  Comments:  Ambulation  Assistive Device: no device  Assistance: Independent  Distance: 400'  Gait Mechanics: decreased weight shift through forefoot, slow russell, steady   Comments:    Stair Mobility  Stair mobility not completed on this date.   Comments:  Balance  Static Sitting Balance: good: independent with functional balance in unsupported position  Dynamic Sitting Balance: good: independent with functional balance in unsupported position  Static Standing Balance: good: independent with functional balance in unsupported position  Dynamic Standing Balance: good: independent with functional balance in unsupported position  Comments:    Other Therapeutic Interventions    Functional Outcomes  AM-PAC Inpatient Mobility Raw Score : 24              Cognition  WFL  Orientation:    A&O x 4    Education  Barriers To Learning: emotional  Patient Education: patient educated on PT role and benefits, discharge recommendations  Learning Assessment:  Pt verbalized and demonstrates understanding    Assessment  Impairments Requiring Therapeutic Intervention: none - eval with same day discharge  Prognosis: good without need for therapy intervention  Clinical Assessment: Patient presenting at independent level for completion of required mobility tasks for return to home. He does have tremors related to alochol use but they do not affect his functional mobility. Eval with d/c at this time. No therapy services indicated. Safety Interventions: patient left in bed, bed alarm in place, call light within reach, and nurse notified    Plan  Frequency: Eval with same day discharge. No follow up required. Current Treatment Recommendations: Not applicable, evaluation completed with same day discharge. Goals  Patient eval with same day discharge. No goals set as patient is at baseline mobility status.       Therapy Session Time      Individual Group Co-treatment   Time In     1116   Time Out     1139   Minutes     23     Timed Code Treatment Minutes:  8 Minutes  Total Treatment Minutes:  23       Electronically Signed By: Tabitha Nowak PT DPT [id number]

## 2022-08-19 NOTE — PROGRESS NOTES
Physical/Occupational Therapy  Yrn Dempsey  PT/OT orders noted and appreciated. Pt currently SAHARA to MRI. PT/OT will follow-up with pt as schedule allows.   Thank you,  Shanta Antonio, PT, DPT, [id number]  [facility], [address] [facility] OTR/L [id number]

## 2022-08-19 NOTE — CARE COORDINATION
Therapy has no further recommendations on discharge. Pt has a pending echo. CM met with patient and mom at bedside. Pt lives in one of his mother's rental homes and can return there at discharge. Pt is willing to go to outpt alcohol rehab. CARMITA while in room called Markell with [facility] [phone] and he had a conversation with patient and set up an assessment for Monday morning at 9am at their [place] location. CM provided pt and his mother with Markell's business card and information. CARMITA had MD place a return to work note for Tuesday. CM printed off and provided to mother as pt is off unit for testing at this time. RN aware of all of the above. Patient discharged 8/19/2022 to home with follow up with [facility].   All discharge needs met per case management     Hannah Ramachandran RN, BSN  [phone]

## 2022-08-19 NOTE — DISCHARGE SUMMARY
Pt discharge instructions given with verbal understanding and teach back. IV and tele removed. Pt discharged to lobby with no complications.

## 2022-09-03 NOTE — DISCHARGE SUMMARY
Hospital Medicine Discharge Summary    Patient: Felisha Wyman     Gender: male  : 1975   Age: 49 y.o. MRN: [id number]    Admitting Physician: Ritu Amin MD  Discharge Physician: Estevan Estrada MD     Code Status: Prior     Admit Date: 2022   Discharge Date: 2022      Disposition:  Home    Discharge Diagnoses: Active Hospital Problems    Diagnosis Date Noted    Acute ischemic stroke ([facility] [address] [I63.9] 2022     Priority: Medium    HTN (hypertension) [I10] 2022     Priority: Medium    Essential tremor [G25.0] 2022     Priority: Medium    Alcoholic encephalopathy ([facility] [address] [G31.2, F10.20] 12/10/2018    Alcohol withdrawal syndrome with complication [facility]) [F24.296] 10/12/2017       Follow-up appointments:  one week    Outpatient to do list: etoh rehab    Condition at Discharge:  [address] Course:   Felisha Wyman is 49 y.o. male who presented with complaint of bilateral leg weakness. Symptom onset was acute for a time period of 1 day. The severity is described as severe. The course of his symptoms over time is resolved. The symptoms improved with none and worsened with none. The patient's symptom is associated with slurred speech and left facial droop. Felisha Wyman is 49 y.o. 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 He also felt his upper body is weak and unable to move it. He thought it was the seizure but denies episode of involuntary shaking or seizure like movement  He was awake and aware of his environment   His ex wife came to his house around 3 pm and noticed that his face on the left was dropping and also had some slurred speech  On interview, there is no slurred speech or left facial droop.  He is able to move all extremities. In the ED, CT head concerning for new ischemic stroke      Stroke work up is negative. Acute ischemic stroke versus TIA   MRI brain without contrast ordered. - negative  Check lipids and hemoglobin A1c  I have started him on aspirin and Lipitor  Consult PT/OT and speech therapy       History of seizure disorder  Continue Keppra per his usual home dose  Monitor and replace electrolytes including magnesium and potassium     Active and ongoing alcohol abuse  Daily thiamine and multivitamin as ordered  Ativan PO PRN for withdrawal symptoms or agitation  If withdrawal is significant then will start CIWA protocol   - going to etoh rehab.     Hypertension  Blood pressure is reasonably low  Therefore for now we will hold his home dose of lisinopril avoid iatrogenic hypotension      Essential tremor   Okay to continue propranolol which she takes at home     Tobacco use disorder  Nicotine patch ordered per patient request       Discharge Medications:   Discharge Medication List as of 8/19/2022  3:36 PM        START taking these medications    Details   aspirin 81 MG EC tablet Take 1 tablet by mouth daily, Disp-30 tablet, R-3Normal      atorvastatin (LIPITOR) 80 MG tablet Take 0.5 tablets by mouth nightly, Disp-30 tablet, R-3Normal           Discharge Medication List as of 8/19/2022  3:36 PM        Discharge Medication List as of 8/19/2022  3:36 PM        CONTINUE these medications which have NOT CHANGED    Details   lisinopril (PRINIVIL;ZESTRIL) 20 MG tablet Take 1 tablet by mouth daily, Disp-30 tablet, R-3Normal      levETIRAcetam (KEPPRA) 500 MG tablet TAKE 1 TABLET BY MOUTH TWICE A DAY, Disp-60 tablet, R-2Normal      propranolol (INDERAL LA) 60 MG extended release capsule Take 1 capsule by mouth daily, Disp-30 capsule, R-2Normal      Blood Pressure KIT DAILY Starting Tue 2/2/2021, Disp-1 kit, R-0, Print           Discharge Medication List as of 8/19/2022  3:36 PM          Discharge ROS:  A complete review of systems was asked and negative except for needs etoh rehab     Discharge Exam:    BP (!) 157/99   Pulse 82   Temp 97.7 °F (36.5 °C) (Axillary)   Resp 18   Ht 5' 9\" (1.753 m)   Wt 155 lb 9.6 oz (70.6 kg)   SpO2 98%   BMI 22.98 kg/m²   General appearance:  No apparent distress, appears stated age and cooperative. HEENT:  Normal cephalic, atraumatic without obvious deformity. Pupils equal, round, and reactive to light. Extra ocular muscles intact. Conjunctivae/corneas clear. Neck: Supple, with full range of motion. No jugular venous distention. Trachea midline. Respiratory:  Normal respiratory effort. Clear to auscultation, bilaterally without Rales/Wheezes/Rhonchi. Cardiovascular:  Regular rate and rhythm with normal S1/S2 without murmurs, rubs or gallops. Abdomen: Soft, non-tender, non-distended with normal bowel sounds. Musculoskeletal:  No clubbing, cyanosis or edema bilaterally. Full range of motion without deformity. Skin: Skin color, texture, turgor normal.  No rashes or lesions. Neurologic:  Neurovascularly intact without any focal sensory/motor deficits. Cranial nerves: II-XII intact, grossly non-focal.  Psychiatric:  Alert and oriented, thought content appropriate, normal insight  Capillary Refill: Brisk,3 seconds, normal  Peripheral Pulses: +2 palpable, equal bilaterally     Labs:   Recent Labs     08/18/22 1923   WBC 5.8   HGB 11.4*   HCT 34.2*             Recent Labs     08/18/22 1923   *   K 3.3*   CL 91*   CO2 23   BUN 12   CREATININE 0.9   CALCIUM 8.4          Recent Labs     08/18/22 1923   *   ALT 90*   BILITOT 0.9   ALKPHOS 53          Recent Labs     08/18/22 1923   INR 0.92          Recent Labs     08/18/22  [address] Cornelius [facility] <0.01                 The patient was seen and examined on day of discharge and this discharge summary is in conjunction with any daily progress note from day of discharge. Time Spent on discharge is  32 minutes   in the examination, evaluation, counseling and review of medications and discharge plan. Note that greater  than 30 minutes was spent in preparing discharge papers, discussing discharge with patient, medication review, etc.       Signed:    Phyllis Zarate MD   9/3/2022      Thank you MULUGETA Lopez - CASIMIRO for the opportunity to be involved in this patient's care.  If you have any questions or concerns please feel free to contact me at [phone]

## 2022-09-06 NOTE — TELEPHONE ENCOUNTER
Patient reports that he was recently prescribed atorvastatin and that he has been experiencing body aches, lack of energy and feeling lethargic. He states that [facility] called him and left a message stating that if he is experiencing muscle pain or aches to please give them a call but he has not been able to get a hold of them. Please advise.

## 2022-09-26 NOTE — PROGRESS NOTES
Katie Valdivia  : 1975  Encounter date: 2022    This megan 49 y.o. male who presents with  Chief Complaint   Patient presents with    Follow-up       History of present illness:    HPI PT is 49year old male for follow up on seizure disorder and ETOH abuse. Pt has established with counseling, debating on entering 5 day detox, reports not in medical facility, concerns regarding risk for DT's and not in clinical setting. Pt does report titrating consumption of ETOH and feeling better, will consider decreasing vs cold turkey. Pt started on propranolol for tremors and BP, well controlled. Pt has not had labs completed, currently taking keppra 500 mg once daily, prescribed BID. BP well controlled. Current Outpatient Medications on File Prior to Visit   Medication Sig Dispense Refill    lisinopril (PRINIVIL;ZESTRIL) 20 MG tablet Take 1 tablet by mouth daily 30 tablet 3    levETIRAcetam (KEPPRA) 500 MG tablet TAKE 1 TABLET BY MOUTH TWICE A DAY 60 tablet 2    aspirin 81 MG EC tablet Take 1 tablet by mouth daily 30 tablet 3    atorvastatin (LIPITOR) 80 MG tablet Take 0.5 tablets by mouth nightly 30 tablet 3    propranolol (INDERAL LA) 60 MG extended release capsule Take 1 capsule by mouth daily 30 capsule 2    Blood Pressure KIT 1 kit by Does not apply route daily 1 kit 0     No current facility-administered medications on file prior to visit.       Allergies   Allergen Reactions    Seasonal      Past Medical History:   Diagnosis Date    Acute ischemic stroke ([facility] [address] 2022    Alcohol withdrawal ([facility] [address] 10/12/2017    Essential tremor 2022    HTN (hypertension) 2022    Seizure ([facility] [address]       Past Surgical History:   Procedure Laterality Date    WRIST SURGERY Right       Family History   Problem Relation Age of Onset    Cancer Mother     Cancer Father       Social History     Tobacco Use    Smoking status: Some Days     Packs/day: 0.50     Years: 31.00     Pack years: 15.50     Types: Cigarettes Smokeless tobacco: Never   Substance Use Topics    Alcohol use: Yes     Comment: 2 every other day         Review of Systems    Objective:    /86 (Site: Left Upper Arm, Position: Sitting, Cuff Size: Medium Adult)   Pulse 72   Temp 97.2 °F (36.2 °C) (Infrared)   Wt 146 lb (66.2 kg)   SpO2 99%   BMI 21.56 kg/m²   Weight: 146 lb (66.2 kg)     BP Readings from Last 3 Encounters:   09/26/22 130/86   08/19/22 (!) 157/99   08/17/22 (!) 148/98     Wt Readings from Last 3 Encounters:   09/26/22 146 lb (66.2 kg)   08/19/22 155 lb 9.6 oz (70.6 kg)   08/17/22 157 lb (71.2 kg)     BMI Readings from Last 3 Encounters:   09/26/22 21.56 kg/m²   08/19/22 22.98 kg/m²   08/17/22 23.18 kg/m²       Physical Exam  Vitals reviewed. Constitutional:       Appearance: Normal appearance. He is well-developed. Cardiovascular:      Rate and Rhythm: Normal rate and regular rhythm. Pulses: Normal pulses. Heart sounds: Normal heart sounds. No murmur heard. Pulmonary:      Effort: Pulmonary effort is normal.      Breath sounds: Normal breath sounds. Skin:     General: Skin is warm and dry. Neurological:      Mental Status: He is alert and oriented to person, place, and time. Psychiatric:         Mood and Affect: Mood normal.         Behavior: Behavior normal.         Thought Content: Thought content normal.         Judgment: Judgment normal.       Assessment/Plan    1. Seizure disorder ([address]  Advised need to take medication as prescribed  Needs to have labs completed  - Levetiracetam Level; Future  - Magnesium; Future  - Vitamin B12 & Folate; Future    2. ETOH abuse  Continue titration  Discussed AA, talking therapy  - Magnesium; Future  - Vitamin D 25 Hydroxy; Future  - Hepatitis C Antibody; Future  - Vitamin B12 & Folate; Future      Return in about 3 months (around 12/26/2022) for medication re-check, lab review. This dictation was generated by voice recognition computer software.   Although all attempts are made to edit the dictation for accuracy, there may be errors in the transcription that are not intended.

## 2022-09-27 NOTE — TELEPHONE ENCOUNTER
----- Message from Kerwin Esquivel sent at 9/27/2022 10:03 AM EDT -----  Subject: Results Request    QUESTIONS  Results: bloodwork labs (pt doesn't know specifics);  Ordered by: Chris Hou   Date Performed: 2022-09-26  ---------------------------------------------------------------------------  --------------  Marcio Weemsing INFO    [phone]; OK to leave message on voicemail  ---------------------------------------------------------------------------  --------------

## 2022-09-27 NOTE — TELEPHONE ENCOUNTER
Pt advise results but he stated that he is looking for today's results which are not available yet.  Pt advise we will call as soon as pcp review results

## 2022-09-28 NOTE — TELEPHONE ENCOUNTER
Pt called stating that he received a text from his pharmacy about a prescription being ready, but he doesn't know what it is. Pt advise that Magnesium was sent over for him.  Pt verbalized understanding and will  Rx

## 2022-09-29 NOTE — TELEPHONE ENCOUNTER
Called Pt , states he does have a release of information that John Alvarez signed to discuss his medical information. Gave Pt  (Ronnell Christian) our fax number and advised that once received and looked over, then will be able to discuss any pertinent medical information. Ronnell Christian stated he understood and would get faxed over in the next hour or so.

## 2022-09-30 NOTE — TELEPHONE ENCOUNTER
Received release of information from Meagan Baron of [facility]. Called and spoke with Marlon Lorenzo and reiterated what Randi stated. Faxed current medication list and list of diagnosis.

## 2022-10-11 NOTE — TELEPHONE ENCOUNTER
Found death certificate that was faxed over 10/4 by Dr Christal Driver. [facility] faxed again. Called and spoke with Paulina English, confirmed that second fax was received.

## 2022-10-11 NOTE — TELEPHONE ENCOUNTER
home checking on status of death certificate faxed over on 10/04    Status please?     [phone], Ghulam Weiss

## 2025-04-06 NOTE — TELEPHONE ENCOUNTER
Chief complaint:   Chief Complaint   Patient presents with   • Hypertension   • Follow-up       Vitals:  Visit Vitals  /64 (BP Location: LUE - Left upper extremity, Patient Position: Sitting, Cuff Size: Regular)   Pulse 82   Ht 5' 6.73\" (1.695 m)   Wt 86.6 kg (191 lb)   SpO2 97%   BMI 30.16 kg/m²       HISTORY OF PRESENT ILLNESS     HPI  This 60 yo female is here for f/u on HTN.   She expresses frustration over her inability to lose weight despite maintaining a rigorous exercise regimen since the beginning of the year, which includes elliptical training and weightlifting. Her current medication includes Rybelsus, which she is hesitant to discontinue due to fear of weight gain. She is considering transitioning to an injectable form but is uncertain about insurance coverage. Her weight has been fluctuating between 185 and 189 pounds at home. She reports a significant reduction in her food intake compared to the past and is actively seeking to reduce body inflammation through dietary changes. She has undergone biometric scanning, which indicated normal body fat levels. She is curious about the potential impact of muscle gain on her weight loss efforts. She has also incorporated turmeric into her diet. She has made substantial modifications to her diet, eliminating sugar and unhealthy oils, and incorporating organic foods. Her daily diet typically consists of hard-boiled eggs, yogurt with berries, chicken salad, and half an avocado. She occasionally indulges in wine with friends and consumes approximately 1700 calories from Monday to Friday, as tracked on [employer]. Her exercise routine is consistent from Monday to Friday, with rest days on Saturday and Sunday, during which she may engage in light activities such as walking. She has discontinued gym workouts due to a lack of motivation post-work.    She is currently on a 10 mg dose of lisinopril for her blood pressure management.    She reports that her  Spoke with coroners office, patient found at home, passed from natural causes. Will have forms faxed to have death certificate signed. depression is well-managed with her current medication regimen, which includes a 450 mg dose. She is not currently engaged in counseling.    She has discontinued iron supplementation and has reduced her vitamin D intake from 5000 to 1000 units daily.    She experiences constant joint pain, with the exception of her ankle, which is pain-free. She reports significant pain in her left knee and hips. She does not report experiencing any chest pain, tightness, shortness of breath, cough, headaches, dizziness, or lightheadedness.    SOCIAL HISTORY  She drinks wine occasionally with friends.    MEDICATIONS  Current: Rybelsus, lisinopril, vitamin D, turmeric.  Discontinued: Meloxicam.      Other significant problems:  Patient Active Problem List    Diagnosis Date Noted   • Right foot drop 05/25/2023     Priority: Low   • Ulcerative pancolitis with rectal bleeding  (CMD) 05/25/2023     Priority: Low   • Iron deficiency anemia due to chronic blood loss 05/25/2023     Priority: Low   • Positive QuantiFERON-TB Gold test 05/25/2023     Priority: Low   • Umbilical hernia without obstruction and without gangrene 05/25/2023     Priority: Low   • Vitamin D deficiency 01/14/2023     Priority: Low   • Overweight (BMI 25.0-29.9) 01/14/2023     Priority: Low   • Psoriasis of scalp 09/20/2022     Priority: Low   • MIRNA on CPAP 07/22/2021     Priority: Low     Sleep study done 7/2021. AutoPAP 5-20 nasal pillows.     • Pes planus 02/17/2014     Priority: Low   • Depression 07/06/2013     Priority: Low   • Primary osteoarthritis of both knees 05/11/2012     Priority: Low   • Hypertension 02/15/2011     Priority: Low   • Dysmetabolic syndrome 11/26/2007     Priority: Low   • Impaired fasting glucose 11/26/2007     Priority: Low   • Low HDL (under 40) 11/26/2007     Priority: Low       PAST MEDICAL, FAMILY AND SOCIAL HISTORY     Medications:  Current Outpatient Medications   Medication Sig Dispense Refill   • Tirzepatide-Weight Management  (Zepbound) 2.5 MG/0.5ML Solution Auto-injector Inject 2.5 mg into the skin every 7 days. Indications: OBESITY 2 mL 1   • lisinopril (ZESTRIL) 20 MG tablet TAKE 1 TABLET BY MOUTH DAILY 90 tablet 3   • buPROPion XL (WELLBUTRIN XL) 150 MG 24 hr tablet TAKE 3 TABLETS BY MOUTH DAILY 270 tablet 3   • inFLIXimab-dyyb (Inflectra) 100 MG injection INFLECTRA 100 MG SOLR     • clobetasol (TEMOVATE) 0.05 % topical solution apply solution to scalp/ears once daily as needed     • VITAMIN D, CHOLECALCIFEROL, PO Take 2,000 Units by mouth daily.       No current facility-administered medications for this visit.       Allergies:  ALLERGIES:  No Known Allergies    Past Medical  History/Surgeries:  Past Medical History:   Diagnosis Date   • Colon polyps    • Hypertension    • MIRNA (obstructive sleep apnea)    • Osteoarthritis        Past Surgical History:   Procedure Laterality Date   •  section, low transverse     • Colonoscopy      diverticulosis   • Colonoscopy  2023    recall 1yr   • Colonoscopy w/ polypectomy  2023   • Medial collateral ligament repair, knee Left    • Tubal ligation         Family History:  Family History   Problem Relation Age of Onset   • Diabetes Mother    • Hypertension Mother    • Heart disease Father    • Hyperlipidemia Father    • Cancer Father         prostate cancer   • HIV Brother        Social History:  Social History     Tobacco Use   • Smoking status: Never   • Smokeless tobacco: Never   Substance Use Topics   • Alcohol use: Not on file       REVIEW OF SYSTEMS     Review of Systems    PHYSICAL EXAM     Physical Exam  Constitutional:       General: She is not in acute distress.     Appearance: Normal appearance. She is well-developed.   Neck:      Vascular: No carotid bruit.   Cardiovascular:      Rate and Rhythm: Normal rate and regular rhythm.      Heart sounds: Normal heart sounds. No murmur heard.  Pulmonary:      Effort: Pulmonary effort is normal.      Breath sounds: Normal  breath sounds. No wheezing or rales.   Musculoskeletal:      Right lower leg: No edema.      Left lower leg: No edema.   Neurological:      Mental Status: She is alert.         ASSESSMENT/PLAN     1.  HCM:  Covid booster declined as just had covid. Tdap 10/10/23.   Shingrix complete.  Colonoscopy 1/27/23 colitis - 5 year repeat.  Mammogram normal 12/3/24. Bone density 9/1/23 -osteopenia tscore -1.6 at hip.  Pap w/ hrHPV normal 1/22/21.  Advanced directives - packet given in the past.   2.  HTN: Controlled with Lisinopril 20mg daily.  Lisinopril/HCTZ 20/25 stopped due to light headedness.  Labs normal 10/3/24.  3.  Depression:    Doing ok on wellbutrin 450 mg daily. Pt no longer in counseling. Suggested weaning down to 300 mg daily but pt did not think that was good idea.    4.  Dylipidemia, metabolic syndrome:  Both LDL and HDL now where they need to be.  No meds at this time.    5.  Insulin resistance, metabolic syndrome:  a1c 10/3/24 5.1 down from  5.9 on semaglutide 14mg daily.  Continue to monitor.    6.  Psoriasis:  seeing Derm.   Clobetasol solution and creams given in the past.    7. MIRNA:  Sleep study 7/2021 with MIRNA. On CPAP AutoPAP 5-20 and doing well with the nasal pillows.    8.  Osteoarthritis, knees:  No NSAIDs. Tylenol for any discomfort. Discourage use of NSAIDs.  Discussed remaining physically active. Using tumeric which is helping.   9.  Vitamin D def:  Restart vitamin D3 1000 iu daily.   10.  Ulcerative Pancolitis, severe:  Colonoscopy 1/27/23. Followed by Dr. Rankin. Now controlled with inflectra 100mg injection.  Mesalamine ineffective. Off prednisone.   11.  Iron deficiency anemia:  now resolved.  Last h/h 10/3/24 normal.   12.  Obesity:  BMI 30.16 up from 29.05  - down from 35.25 on Rybelsis 14mg daily - will stop rybelsis and start zepbound 2.5mg weekly.  stopped due to colitis but now restarted.    13.  Indeterminate QuantiFERON gold: saw ID.  No TB.    14.  Umbilical hernia:  Needs to see  surgery.  Pt would would like to hold off for now.  Signs and symptoms to go to ER gone over.  15.  Osteopenia:  Bone density 9/1/23 -osteopenia tscore -1.6 at hip.  Vit D, Calcium and weight bearing.  Vit adequately replaced 10/3/24.    16.  Right ankle arthritis:  s/p ankle fusion 12/29/23.  Doing well.